# Patient Record
Sex: FEMALE | Race: BLACK OR AFRICAN AMERICAN | NOT HISPANIC OR LATINO | Employment: UNEMPLOYED | URBAN - METROPOLITAN AREA
[De-identification: names, ages, dates, MRNs, and addresses within clinical notes are randomized per-mention and may not be internally consistent; named-entity substitution may affect disease eponyms.]

---

## 2023-08-24 PROBLEM — N39.0 UTI (URINARY TRACT INFECTION): Status: ACTIVE | Noted: 2023-08-24

## 2023-08-24 PROBLEM — E11.9 DMII (DIABETES MELLITUS, TYPE 2) (MULTI): Status: ACTIVE | Noted: 2023-08-24

## 2023-08-24 PROBLEM — E78.5 HLD (HYPERLIPIDEMIA): Status: ACTIVE | Noted: 2023-08-24

## 2023-08-24 PROBLEM — H33.22 LEFT RETINAL DETACHMENT: Status: ACTIVE | Noted: 2023-08-24

## 2023-08-24 PROBLEM — H54.7 POOR VISION: Status: ACTIVE | Noted: 2023-08-24

## 2023-08-24 PROBLEM — D64.9 ANEMIA: Status: ACTIVE | Noted: 2023-08-24

## 2023-08-24 PROBLEM — R29.898 LOWER EXTREMITY WEAKNESS: Status: ACTIVE | Noted: 2023-08-24

## 2023-08-24 PROBLEM — Z86.73 HISTORY OF STROKE: Status: ACTIVE | Noted: 2023-08-24

## 2023-08-24 PROBLEM — E55.9 VITAMIN D DEFICIENCY: Status: ACTIVE | Noted: 2023-08-24

## 2023-08-24 PROBLEM — I25.10 CAD (CORONARY ARTERY DISEASE): Status: ACTIVE | Noted: 2023-08-24

## 2023-08-24 PROBLEM — R53.83 FATIGUE: Status: ACTIVE | Noted: 2023-08-24

## 2023-08-24 PROBLEM — R80.9 PROTEINURIA: Status: ACTIVE | Noted: 2023-08-24

## 2023-08-24 PROBLEM — I10 HYPERTENSION, ESSENTIAL: Status: ACTIVE | Noted: 2023-08-24

## 2023-08-24 PROBLEM — I25.2 H/O NON-ST ELEVATION MYOCARDIAL INFARCTION (NSTEMI): Status: ACTIVE | Noted: 2023-08-24

## 2023-08-24 PROBLEM — N18.32 ANEMIA DUE TO STAGE 3B CHRONIC KIDNEY DISEASE (MULTI): Status: ACTIVE | Noted: 2023-08-24

## 2023-08-24 PROBLEM — D63.1 ANEMIA DUE TO STAGE 3B CHRONIC KIDNEY DISEASE (MULTI): Status: ACTIVE | Noted: 2023-08-24

## 2023-08-24 RX ORDER — FAMOTIDINE 20 MG/1
1 TABLET, FILM COATED ORAL DAILY
COMMUNITY
Start: 2021-12-01 | End: 2023-12-21 | Stop reason: WASHOUT

## 2023-08-24 RX ORDER — DULAGLUTIDE 0.75 MG/.5ML
1 INJECTION, SOLUTION SUBCUTANEOUS
COMMUNITY
Start: 2022-03-14 | End: 2024-04-24 | Stop reason: SDUPTHER

## 2023-08-24 RX ORDER — ASPIRIN 325 MG
1 TABLET, DELAYED RELEASE (ENTERIC COATED) ORAL
COMMUNITY
Start: 2022-03-10 | End: 2024-01-04 | Stop reason: WASHOUT

## 2023-08-24 RX ORDER — ERGOCALCIFEROL 1.25 MG/1
CAPSULE ORAL
COMMUNITY
Start: 2022-03-30 | End: 2024-01-18 | Stop reason: ALTCHOICE

## 2023-08-24 RX ORDER — BUPROPION HYDROCHLORIDE 200 MG/1
1 TABLET, EXTENDED RELEASE ORAL 2 TIMES DAILY
COMMUNITY
Start: 2023-08-16

## 2023-08-24 RX ORDER — FERROUS SULFATE 325(65) MG
1 TABLET ORAL DAILY
COMMUNITY
Start: 2022-03-30 | End: 2024-04-24 | Stop reason: SDUPTHER

## 2023-08-24 RX ORDER — AMLODIPINE BESYLATE 10 MG/1
1 TABLET ORAL DAILY
COMMUNITY
Start: 2021-12-01 | End: 2024-04-24 | Stop reason: SDUPTHER

## 2023-08-24 RX ORDER — NAPROXEN SODIUM 220 MG/1
1 TABLET, FILM COATED ORAL DAILY
COMMUNITY
Start: 2021-12-01

## 2023-08-24 RX ORDER — CHOLECALCIFEROL (VITAMIN D3) 50 MCG
1 TABLET ORAL DAILY
COMMUNITY
Start: 2023-08-16 | End: 2024-01-24 | Stop reason: WASHOUT

## 2023-08-24 RX ORDER — CLOPIDOGREL BISULFATE 75 MG/1
1 TABLET ORAL DAILY
COMMUNITY
Start: 2022-03-30 | End: 2024-04-24 | Stop reason: SDUPTHER

## 2023-08-24 RX ORDER — PANTOPRAZOLE SODIUM 20 MG/1
1 TABLET, DELAYED RELEASE ORAL DAILY
COMMUNITY
Start: 2023-08-16

## 2023-08-24 RX ORDER — ATORVASTATIN CALCIUM 80 MG/1
1 TABLET, FILM COATED ORAL NIGHTLY
COMMUNITY
Start: 2021-12-01 | End: 2024-04-24 | Stop reason: SDUPTHER

## 2023-08-24 RX ORDER — LISINOPRIL 10 MG/1
1 TABLET ORAL DAILY
COMMUNITY
Start: 2022-03-10 | End: 2024-01-04 | Stop reason: DRUGHIGH

## 2023-08-24 RX ORDER — BLOOD-GLUCOSE METER
KIT MISCELLANEOUS
COMMUNITY
Start: 2022-03-02 | End: 2024-02-15 | Stop reason: ALTCHOICE

## 2023-08-24 RX ORDER — FUROSEMIDE 40 MG/1
40 TABLET ORAL 2 TIMES DAILY
COMMUNITY
Start: 2023-04-13 | End: 2024-01-18 | Stop reason: ALTCHOICE

## 2023-08-24 RX ORDER — BISACODYL 5 MG
1 TABLET, DELAYED RELEASE (ENTERIC COATED) ORAL DAILY PRN
COMMUNITY
Start: 2023-08-16 | End: 2024-04-24 | Stop reason: SDUPTHER

## 2023-08-24 RX ORDER — FOLIC ACID 1 MG/1
1 TABLET ORAL DAILY
COMMUNITY
Start: 2023-08-16 | End: 2024-04-24 | Stop reason: SDUPTHER

## 2023-08-24 RX ORDER — DOCUSATE SODIUM 100 MG/1
1 CAPSULE, LIQUID FILLED ORAL 2 TIMES DAILY PRN
COMMUNITY
Start: 2023-08-16

## 2023-08-24 RX ORDER — BLOOD SUGAR DIAGNOSTIC
STRIP MISCELLANEOUS
COMMUNITY
Start: 2022-03-07 | End: 2024-04-24 | Stop reason: SDUPTHER

## 2023-08-24 RX ORDER — CARVEDILOL 12.5 MG/1
1 TABLET ORAL
COMMUNITY
Start: 2021-12-01 | End: 2024-04-24 | Stop reason: SDUPTHER

## 2023-08-24 RX ORDER — ACETAMINOPHEN 500 MG
1000 TABLET ORAL EVERY 8 HOURS
COMMUNITY
Start: 2023-08-16 | End: 2023-12-21 | Stop reason: WASHOUT

## 2023-12-01 ENCOUNTER — LAB (OUTPATIENT)
Dept: LAB | Facility: LAB | Age: 53
End: 2023-12-01
Payer: MEDICARE

## 2023-12-01 ENCOUNTER — OFFICE VISIT (OUTPATIENT)
Dept: NEPHROLOGY | Facility: CLINIC | Age: 53
End: 2023-12-01
Payer: MEDICARE

## 2023-12-01 VITALS
SYSTOLIC BLOOD PRESSURE: 150 MMHG | DIASTOLIC BLOOD PRESSURE: 77 MMHG | HEIGHT: 63 IN | RESPIRATION RATE: 18 BRPM | OXYGEN SATURATION: 99 % | BODY MASS INDEX: 30.55 KG/M2 | WEIGHT: 172.4 LBS | HEART RATE: 97 BPM | TEMPERATURE: 97.7 F

## 2023-12-01 DIAGNOSIS — N18.4 TYPE 2 DIABETES MELLITUS WITH STAGE 4 CHRONIC KIDNEY DISEASE, UNSPECIFIED WHETHER LONG TERM INSULIN USE (MULTI): Primary | ICD-10-CM

## 2023-12-01 DIAGNOSIS — I10 HYPERTENSION, ESSENTIAL: ICD-10-CM

## 2023-12-01 DIAGNOSIS — N18.4 CHRONIC KIDNEY DISEASE, STAGE 4 (SEVERE) (MULTI): ICD-10-CM

## 2023-12-01 DIAGNOSIS — R80.1 PERSISTENT PROTEINURIA: ICD-10-CM

## 2023-12-01 DIAGNOSIS — N39.0 URINARY TRACT INFECTION WITHOUT HEMATURIA, SITE UNSPECIFIED: ICD-10-CM

## 2023-12-01 DIAGNOSIS — E11.22 TYPE 2 DIABETES MELLITUS WITH STAGE 4 CHRONIC KIDNEY DISEASE, UNSPECIFIED WHETHER LONG TERM INSULIN USE (MULTI): Primary | ICD-10-CM

## 2023-12-01 DIAGNOSIS — E11.22 TYPE 2 DIABETES MELLITUS WITH STAGE 4 CHRONIC KIDNEY DISEASE, UNSPECIFIED WHETHER LONG TERM INSULIN USE (MULTI): ICD-10-CM

## 2023-12-01 DIAGNOSIS — N13.5 OBSTRUCTION OF URETER, UNSPECIFIED LATERALITY: ICD-10-CM

## 2023-12-01 DIAGNOSIS — N18.4 TYPE 2 DIABETES MELLITUS WITH STAGE 4 CHRONIC KIDNEY DISEASE, UNSPECIFIED WHETHER LONG TERM INSULIN USE (MULTI): ICD-10-CM

## 2023-12-01 LAB
APPEARANCE UR: ABNORMAL
BILIRUB UR STRIP.AUTO-MCNC: ABNORMAL MG/DL
COLOR UR: YELLOW
CREAT UR-MCNC: 121.5 MG/DL (ref 20–320)
ERYTHROCYTE [DISTWIDTH] IN BLOOD BY AUTOMATED COUNT: 11.2 % (ref 11.5–14.5)
GLUCOSE UR STRIP.AUTO-MCNC: NEGATIVE MG/DL
HCT VFR BLD AUTO: 31.1 % (ref 36–46)
HGB BLD-MCNC: 10.5 G/DL (ref 12–16)
HOLD SPECIMEN: NORMAL
KETONES UR STRIP.AUTO-MCNC: NEGATIVE MG/DL
LEUKOCYTE ESTERASE UR QL STRIP.AUTO: ABNORMAL
MCH RBC QN AUTO: 30 PG (ref 26–34)
MCHC RBC AUTO-ENTMCNC: 33.8 G/DL (ref 32–36)
MCV RBC AUTO: 89 FL (ref 80–100)
MICROALBUMIN UR-MCNC: 1174.2 MG/L
MICROALBUMIN/CREAT UR: 966.4 UG/MG CREAT
NITRITE UR QL STRIP.AUTO: NEGATIVE
NRBC BLD-RTO: 0 /100 WBCS (ref 0–0)
PH UR STRIP.AUTO: 5.5 [PH]
PLATELET # BLD AUTO: 283 X10*3/UL (ref 150–450)
PROT UR STRIP.AUTO-MCNC: ABNORMAL MG/DL
PTH-INTACT SERPL-MCNC: 203 PG/ML (ref 18.5–88)
RBC # BLD AUTO: 3.5 X10*6/UL (ref 4–5.2)
RBC # UR STRIP.AUTO: ABNORMAL /UL
RBC #/AREA URNS AUTO: ABNORMAL /HPF
SP GR UR STRIP.AUTO: 1.02
UROBILINOGEN UR STRIP.AUTO-MCNC: 0.2 MG/DL
WBC # BLD AUTO: 6 X10*3/UL (ref 4.4–11.3)
WBC #/AREA URNS AUTO: ABNORMAL /HPF

## 2023-12-01 PROCEDURE — 80053 COMPREHEN METABOLIC PANEL: CPT

## 2023-12-01 PROCEDURE — 4010F ACE/ARB THERAPY RXD/TAKEN: CPT | Performed by: NURSE PRACTITIONER

## 2023-12-01 PROCEDURE — 82043 UR ALBUMIN QUANTITATIVE: CPT

## 2023-12-01 PROCEDURE — 3062F POS MACROALBUMINURIA REV: CPT | Performed by: NURSE PRACTITIONER

## 2023-12-01 PROCEDURE — 82306 VITAMIN D 25 HYDROXY: CPT

## 2023-12-01 PROCEDURE — 81001 URINALYSIS AUTO W/SCOPE: CPT

## 2023-12-01 PROCEDURE — 1036F TOBACCO NON-USER: CPT | Performed by: NURSE PRACTITIONER

## 2023-12-01 PROCEDURE — 3066F NEPHROPATHY DOC TX: CPT | Performed by: NURSE PRACTITIONER

## 2023-12-01 PROCEDURE — 99214 OFFICE O/P EST MOD 30 MIN: CPT | Performed by: NURSE PRACTITIONER

## 2023-12-01 PROCEDURE — 3078F DIAST BP <80 MM HG: CPT | Performed by: NURSE PRACTITIONER

## 2023-12-01 PROCEDURE — 3077F SYST BP >= 140 MM HG: CPT | Performed by: NURSE PRACTITIONER

## 2023-12-01 PROCEDURE — 85027 COMPLETE CBC AUTOMATED: CPT

## 2023-12-01 PROCEDURE — 83036 HEMOGLOBIN GLYCOSYLATED A1C: CPT

## 2023-12-01 PROCEDURE — 87086 URINE CULTURE/COLONY COUNT: CPT

## 2023-12-01 PROCEDURE — 87186 SC STD MICRODIL/AGAR DIL: CPT

## 2023-12-01 PROCEDURE — 83970 ASSAY OF PARATHORMONE: CPT

## 2023-12-01 PROCEDURE — 82570 ASSAY OF URINE CREATININE: CPT

## 2023-12-01 PROCEDURE — 36415 COLL VENOUS BLD VENIPUNCTURE: CPT

## 2023-12-02 DIAGNOSIS — N39.0 URINARY TRACT INFECTION WITHOUT HEMATURIA, SITE UNSPECIFIED: Primary | ICD-10-CM

## 2023-12-02 LAB
25(OH)D3 SERPL-MCNC: 27 NG/ML (ref 30–100)
ALBUMIN SERPL BCP-MCNC: 3.7 G/DL (ref 3.4–5)
ALP SERPL-CCNC: 78 U/L (ref 33–110)
ALT SERPL W P-5'-P-CCNC: 15 U/L (ref 7–45)
ANION GAP SERPL CALC-SCNC: 18 MMOL/L (ref 10–20)
AST SERPL W P-5'-P-CCNC: 22 U/L (ref 9–39)
BILIRUB SERPL-MCNC: 0.5 MG/DL (ref 0–1.2)
BUN SERPL-MCNC: 46 MG/DL (ref 6–23)
CALCIUM SERPL-MCNC: 9.4 MG/DL (ref 8.6–10.6)
CHLORIDE SERPL-SCNC: 104 MMOL/L (ref 98–107)
CO2 SERPL-SCNC: 24 MMOL/L (ref 21–32)
CREAT SERPL-MCNC: 3.3 MG/DL (ref 0.5–1.05)
GFR SERPL CREATININE-BSD FRML MDRD: 16 ML/MIN/1.73M*2
GLUCOSE SERPL-MCNC: 163 MG/DL (ref 74–99)
POTASSIUM SERPL-SCNC: 4.6 MMOL/L (ref 3.5–5.3)
PROT SERPL-MCNC: 6.9 G/DL (ref 6.4–8.2)
SODIUM SERPL-SCNC: 141 MMOL/L (ref 136–145)

## 2023-12-02 RX ORDER — CIPROFLOXACIN 500 MG/1
500 TABLET ORAL DAILY
Qty: 7 TABLET | Refills: 0 | Status: SHIPPED | OUTPATIENT
Start: 2023-12-02 | End: 2023-12-02 | Stop reason: ENTERED-IN-ERROR

## 2023-12-02 RX ORDER — CIPROFLOXACIN 500 MG/1
500 TABLET ORAL DAILY
Qty: 7 TABLET | Refills: 0 | Status: SHIPPED | OUTPATIENT
Start: 2023-12-02 | End: 2023-12-09

## 2023-12-03 LAB — BACTERIA UR CULT: ABNORMAL

## 2023-12-04 RX ORDER — AMOXICILLIN AND CLAVULANATE POTASSIUM 500; 125 MG/1; MG/1
500 TABLET, FILM COATED ORAL 2 TIMES DAILY
Qty: 14 TABLET | Refills: 0 | Status: SHIPPED | OUTPATIENT
Start: 2023-12-04 | End: 2023-12-11

## 2023-12-06 ASSESSMENT — ENCOUNTER SYMPTOMS
HEMATOLOGIC/LYMPHATIC NEGATIVE: 1
NEUROLOGICAL NEGATIVE: 1
PSYCHIATRIC NEGATIVE: 1
GASTROINTESTINAL NEGATIVE: 1
MUSCULOSKELETAL NEGATIVE: 1
RESPIRATORY NEGATIVE: 1
CONSTITUTIONAL NEGATIVE: 1
CARDIOVASCULAR NEGATIVE: 1
ENDOCRINE NEGATIVE: 1

## 2023-12-07 LAB
EST. AVERAGE GLUCOSE BLD GHB EST-MCNC: 100 MG/DL
HBA1C MFR BLD: 5.1 %

## 2023-12-07 NOTE — PROGRESS NOTES
History Of Present Illness  Yola Barillas is a 53 y.o. female with medical history significant for CKD4, proteinuria, DMII with proliferative retinopathy, HTN, HLD, CAD, NSTEMI, CVA (2019), ureteral obstruction due to extrinsic compression from fibroids s/p bilateral stent placement (9/29/2022) and s/p hysterectomy & removal of left stent (10/19/23), right ureteral stricture s/p right ureteral stent placement (1/10/23) and subsequent retrograde pyelogram (1/24/23), JOYCELYN, E.coli urosepsis needing hospitalization (10/7 - 10/21/22), and long-term NSAIDs use who presents for a 3-month fuv for CKD.    Of note, patient was previously followed by Nephrology at [facility], and recently established care at  with Dr. Carroll in August 2023. She was previously followed by Urology at [facility], but has not been seen since retrograde pyelogram (1/24/23).     Past Medical History  As above.    Surgical History  She has a past surgical history that includes Other surgical history (03/30/2022).     Social History  She reports that she has never smoked. She has never used smokeless tobacco. She reports that she does not drink alcohol and does not use drugs.    Family History  Family History   Problem Relation Name Age of Onset    Heart attack Mother          Allergies  Sulfamethoxazole-trimethoprim    Review of Systems   Constitutional: Negative.    HENT: Negative.     Eyes:  Positive for visual disturbance.   Respiratory: Negative.     Cardiovascular: Negative.    Gastrointestinal: Negative.    Endocrine: Negative.    Genitourinary: Negative.    Musculoskeletal: Negative.    Skin: Negative.    Neurological: Negative.    Hematological: Negative.    Psychiatric/Behavioral: Negative.          Physical Exam  Constitutional:       Appearance: Normal appearance.   HENT:      Head: Normocephalic and atraumatic.      Mouth/Throat:      Mouth: Mucous membranes are moist.   Cardiovascular:      Rate and Rhythm: Normal rate  "and regular rhythm.      Pulses: Normal pulses.      Heart sounds: Normal heart sounds.   Pulmonary:      Effort: Pulmonary effort is normal.      Breath sounds: Normal breath sounds.   Musculoskeletal:         General: Normal range of motion.   Skin:     General: Skin is warm and dry.   Neurological:      General: No focal deficit present.      Mental Status: She is alert and oriented to person, place, and time.   Psychiatric:         Mood and Affect: Mood normal.         Behavior: Behavior normal.         Thought Content: Thought content normal.         Judgment: Judgment normal.          Last Recorded Vitals  Blood pressure 150/77, pulse 97, temperature 36.5 °C (97.7 °F), resp. rate 18, height 1.6 m (5' 3\"), weight 78.2 kg (172 lb 6.4 oz), SpO2 99 %.    Relevant Results  4/11/23: sCr 2.65 mg/dL, eGFR 21 ml/min/1.73m2.  12/15/22: HgbA1c 5.4%.    Assessment/Plan   53 y.o. female with medical history significant for CKD4, proteinuria, DMII with proliferative retinopathy, HTN, HLD, CAD, NSTEMI, CVA (2019), ureteral obstruction due to extrinsic compression from fibroids s/p bilateral stent placement (9/29/2022) and s/p hysterectomy & removal of left stent (10/19/23), right ureteral stricture s/p right ureteral stent placement (1/10/23) and subsequent retrograde pyelogram (1/24/23), JOYCELYN, E.coli urosepsis needing hospitalization (10/7 - 10/21/22), and long-term NSAIDs use who presents for a 3-month fuv for CKD.    # CKD4: baseline sCr 2.2 - 3.4 mg/dL with eGFR 18 - 26 ml/min/1.73m2. Likely due to diabetic nephropathy, hypertensive nephropathy, obstructive nephropathy, analgesic nephropathy given hx of long-term NSAIDs use, and partial recovery from previous JOYCELYN. Most recent sCr 2.65 mg/dL (4/11/23) - at baseline. Currently stable.    # Proteinuria: random urine spot tests showed urine albumin level > 2250 mg/L since at least March 2022. Likely due to diabetic nephropathy and hypertensive nephropathy. On ACEi.    # DMII: " > 20 years. Most recent HgbA1c 5.4% (12/15/22). Followed by PCP.    # HTN: > 20 years. Slightly elevated in office today. No regular home BP checks.    # Ureteral obstruction: with severe hydroureteronephrosis due to extrinsic compression from fibroids s/p bilateral stent placement (2022) and s/p hysterectomy & removal of left stent (10/19/23); right ureteral stricture s/p right ureteral stent placement (1/10/23) and subsequent retrograde pyelogram (23). Previously followed at [facility], but has not been seen since retrograde pyelogram (23). No further follow-up renal imaging.    Plan:  - Labs: CBC, CMP, PTH, Vit D, HgbA1c; UA, urine spot.  - Kidney US.  - Refer to Kidney Care Navigator program.  - Advised regular home BP checks and keep a log to bring it to next visit. Target < 130/80 mmHg. To call providers if BPs are persistently > 130/80 mmHg.  - Continue to follow up with Urology.  - Continue to follow up with PCP for optimal DMII and HTN management.  - No changes with current medications.  - Reviewed strategies for preserving remaining kidney function includin) Avoidance of NSAIDs including Aleve (Naprosyn), Motrin (Ibuprofen), Mobic (Meloxicam), Celebrex (Celecoxib) and aspirin as well as PPI acid blocking medications such as Prilosec (omeprazole), Protonix (pantoprazole), and Nexium (esomeprazole), as well as other nephrotoxic agents.   2) Avoidance of tobacco or alcohol use.   3) Adequate hydration daily.   4) Blood pressure target 130/80 mmHg.   5) Daily dietary sodium intake less than 2 grams per day.    6) Maintain healthy lifestyle. Healthy diet and regular exercises.   7) Maintain ideal weight.  - FUV: in 3 months or sooner if concerns arise.     Patient verbalized understanding of above. She will not hesitate to contact Division of Nephrology at [phone] with concerns/questions.    I spent 35 minutes in the professional and overall care of this patient.      Katerine  Kristopher, APRN-CNP

## 2023-12-21 ENCOUNTER — APPOINTMENT (OUTPATIENT)
Dept: NEPHROLOGY | Facility: CLINIC | Age: 53
End: 2023-12-21
Payer: MEDICARE

## 2023-12-21 ENCOUNTER — OFFICE VISIT (OUTPATIENT)
Dept: NEPHROLOGY | Facility: CLINIC | Age: 53
End: 2023-12-21
Payer: MEDICARE

## 2023-12-21 VITALS
HEIGHT: 63 IN | DIASTOLIC BLOOD PRESSURE: 92 MMHG | HEART RATE: 90 BPM | WEIGHT: 176 LBS | BODY MASS INDEX: 31.18 KG/M2 | SYSTOLIC BLOOD PRESSURE: 165 MMHG

## 2023-12-21 DIAGNOSIS — N18.4 HYPERTENSION ASSOCIATED WITH STAGE 4 CHRONIC KIDNEY DISEASE DUE TO TYPE 2 DIABETES MELLITUS (MULTI): ICD-10-CM

## 2023-12-21 DIAGNOSIS — E11.22 HYPERTENSION ASSOCIATED WITH STAGE 4 CHRONIC KIDNEY DISEASE DUE TO TYPE 2 DIABETES MELLITUS (MULTI): ICD-10-CM

## 2023-12-21 DIAGNOSIS — I10 HYPERTENSION, ESSENTIAL: Primary | ICD-10-CM

## 2023-12-21 DIAGNOSIS — N18.4 CHRONIC KIDNEY DISEASE, STAGE 4 (SEVERE) (MULTI): ICD-10-CM

## 2023-12-21 DIAGNOSIS — I12.9 HYPERTENSION ASSOCIATED WITH STAGE 4 CHRONIC KIDNEY DISEASE DUE TO TYPE 2 DIABETES MELLITUS (MULTI): ICD-10-CM

## 2023-12-21 PROCEDURE — 3044F HG A1C LEVEL LT 7.0%: CPT | Performed by: NURSE PRACTITIONER

## 2023-12-21 PROCEDURE — 1036F TOBACCO NON-USER: CPT | Performed by: NURSE PRACTITIONER

## 2023-12-21 PROCEDURE — 4010F ACE/ARB THERAPY RXD/TAKEN: CPT | Performed by: NURSE PRACTITIONER

## 2023-12-21 PROCEDURE — 99215 OFFICE O/P EST HI 40 MIN: CPT | Performed by: NURSE PRACTITIONER

## 2023-12-21 PROCEDURE — 3066F NEPHROPATHY DOC TX: CPT | Performed by: NURSE PRACTITIONER

## 2023-12-21 PROCEDURE — 3062F POS MACROALBUMINURIA REV: CPT | Performed by: NURSE PRACTITIONER

## 2023-12-21 ASSESSMENT — ENCOUNTER SYMPTOMS
CHEST TIGHTNESS: 0
HEMATURIA: 0
PALPITATIONS: 1
SPEECH DIFFICULTY: 1
ACTIVITY CHANGE: 1
DIZZINESS: 0
VOMITING: 0
LIGHT-HEADEDNESS: 0
APPETITE CHANGE: 0
DYSURIA: 0
NAUSEA: 0
SHORTNESS OF BREATH: 0

## 2023-12-21 ASSESSMENT — PAIN SCALES - GENERAL: PAINLEVEL: 0-NO PAIN

## 2023-12-21 NOTE — PATIENT INSTRUCTIONS
It was nice to meet you Yola!  Your blood pressure is above goal but you have not taken your meds yet  Please make sure you are taking carvedilol twice daily, not just once.  Please return in 2 weeks for BP and lab check.  Please make sure to take your pills in the morning before coming.   I will refer you to dietician for assistance.  Avoid foods high in sodium.  Avoid red meat.  Incorporate plant based protein in diet if you want to try it even one day per week  Avoid processed foods and carry out.   At this point you are leaning towards PD but will research and think about options

## 2023-12-21 NOTE — PROGRESS NOTES
"Subjective   Patient ID: Yola Barillas is a 53 y.o. female who presents for Follow-up.  HPI  Pt with CKD stage 4 with +albuminuria last seen by JERRELL Summers in early Dec and by Carly in August. Was previously followed by nephrologist at [facility]. Had JOYCELYN on CKD in 10/2022 while hospitalized with sepsis. Some improvement following that episode but recent labs 12/1 reveal progression.  Last Cr 3.30 with eGFR 16ml/min. PMH includes remote CVA (2019).   Has noticed over past months that she is more tired, taking longer in the morning to \"get going\"      Review of Systems   Constitutional:  Positive for activity change. Negative for appetite change.        Energy level worsening over past year.  Takes naps during day   Respiratory:  Negative for chest tightness and shortness of breath.    Cardiovascular:  Positive for palpitations. Negative for chest pain and leg swelling.        Denies MCKINLEY   Has occasionally felt some palpitations at night    Gastrointestinal:  Negative for nausea and vomiting.   Genitourinary:  Negative for dysuria and hematuria.   Skin:  Negative for rash.        +itching all over for ~1 month    Neurological:  Positive for speech difficulty. Negative for dizziness and light-headedness.        Some word finding since stroke.   Halting speech        Objective   Physical Exam  Constitutional:       General: She is not in acute distress.     Appearance: Normal appearance. She is not ill-appearing.   HENT:      Mouth/Throat:      Mouth: Mucous membranes are moist.   Cardiovascular:      Rate and Rhythm: Normal rate and regular rhythm.      Heart sounds: Normal heart sounds.   Pulmonary:      Effort: Pulmonary effort is normal.      Breath sounds: Normal breath sounds.   Abdominal:      Palpations: Abdomen is soft.   Musculoskeletal:         General: No swelling.      Cervical back: Normal range of motion.      Right lower leg: No edema.      Left lower leg: No edema.   Skin:     General: Skin is warm and " "dry.   Neurological:      Mental Status: She is alert.      Comments: Halting speech at times    Psychiatric:         Mood and Affect: Mood normal.         Behavior: Behavior normal.     BP (!) 165/92   Pulse 90   Ht 1.6 m (5' 3\")   Wt 79.8 kg (176 lb)   BMI 31.18 kg/m²      Met with pt to discuss RRT modalities: PD, iHD, Home HD, transplant and conservative medical management.  Extensive discussion of the pros and cons of each modality.  Discussed next steps involved in planning for HD: vein sparing, vein mapping, vascular consult, surgery for access placement and CDC decision.  Discussed next steps of PD: PD nephrologist consult, PD consult with nurses, dietician and SW, surgery consult for PD catheter placement, home visit.  Discussed indications for initiating RRT.    Discussed transplant process: types of donors, waiting list, surgery involved and initial steps: class, meetings with nephrologist, surgeon, SW etc. Reinforced attending all scheduled appts with all providers and adherence to medications.  Reinforced blood pressure control and/or blood sugar control (if applies) to extend life of kidney.  Dietary recs: low sodium, avoidance of red meat, modified protein, introducing plant based proteins, components of renal diet: offered referral to dietician.  Pt interactive with questions, satisfied with answers.  Given internet sites to review: [email or web address] and [email or web address]  Shared contact info for this provider.  Next appt with nephrologist:    At the end of the session pt is leaning towards PD but will like to think about it and read more about it.        Assessment/Plan   Diagnoses and all orders for this visit:  Hypertension, essential  BP elevated today, has not taken meds yet today   Was not taking carvedilol twice daily  To return in 2 weeks for BP recheck and labs  Hypertension associated with stage 4 chronic kidney disease due to type 2 diabetes mellitus (CMS/[facility])  No obvious uremic symptoms, " overall more fatigue over past year   ESRD Options education. May be leading toward PD but will look further into it   Follow up in 2 weeks for repeat BP and lab check   Dietician referral          EDILBERTO Stout-CNP 12/21/23 11:25 AM

## 2024-01-04 ENCOUNTER — LAB (OUTPATIENT)
Dept: LAB | Facility: LAB | Age: 54
End: 2024-01-04
Payer: MEDICARE

## 2024-01-04 ENCOUNTER — OFFICE VISIT (OUTPATIENT)
Dept: NEPHROLOGY | Facility: CLINIC | Age: 54
End: 2024-01-04
Payer: MEDICARE

## 2024-01-04 VITALS
WEIGHT: 185 LBS | HEART RATE: 94 BPM | BODY MASS INDEX: 31.58 KG/M2 | SYSTOLIC BLOOD PRESSURE: 159 MMHG | HEIGHT: 64 IN | DIASTOLIC BLOOD PRESSURE: 81 MMHG

## 2024-01-04 DIAGNOSIS — N39.0 URINARY TRACT INFECTION WITHOUT HEMATURIA, SITE UNSPECIFIED: ICD-10-CM

## 2024-01-04 DIAGNOSIS — D63.1 ANEMIA DUE TO STAGE 3B CHRONIC KIDNEY DISEASE (MULTI): ICD-10-CM

## 2024-01-04 DIAGNOSIS — N18.32 ANEMIA DUE TO STAGE 3B CHRONIC KIDNEY DISEASE (MULTI): ICD-10-CM

## 2024-01-04 DIAGNOSIS — E11.22 HYPERTENSION ASSOCIATED WITH STAGE 4 CHRONIC KIDNEY DISEASE DUE TO TYPE 2 DIABETES MELLITUS (MULTI): ICD-10-CM

## 2024-01-04 DIAGNOSIS — I12.9 HYPERTENSION ASSOCIATED WITH STAGE 4 CHRONIC KIDNEY DISEASE DUE TO TYPE 2 DIABETES MELLITUS (MULTI): ICD-10-CM

## 2024-01-04 DIAGNOSIS — I12.9 HYPERTENSION ASSOCIATED WITH STAGE 4 CHRONIC KIDNEY DISEASE DUE TO TYPE 2 DIABETES MELLITUS (MULTI): Primary | ICD-10-CM

## 2024-01-04 DIAGNOSIS — N18.4 HYPERTENSION ASSOCIATED WITH STAGE 4 CHRONIC KIDNEY DISEASE DUE TO TYPE 2 DIABETES MELLITUS (MULTI): ICD-10-CM

## 2024-01-04 DIAGNOSIS — E11.22 HYPERTENSION ASSOCIATED WITH STAGE 4 CHRONIC KIDNEY DISEASE DUE TO TYPE 2 DIABETES MELLITUS (MULTI): Primary | ICD-10-CM

## 2024-01-04 DIAGNOSIS — I10 HYPERTENSION, ESSENTIAL: ICD-10-CM

## 2024-01-04 DIAGNOSIS — N18.4 HYPERTENSION ASSOCIATED WITH STAGE 4 CHRONIC KIDNEY DISEASE DUE TO TYPE 2 DIABETES MELLITUS (MULTI): Primary | ICD-10-CM

## 2024-01-04 LAB
ALBUMIN SERPL BCP-MCNC: 3.7 G/DL (ref 3.4–5)
ANION GAP SERPL CALC-SCNC: 16 MMOL/L (ref 10–20)
APPEARANCE UR: ABNORMAL
BACTERIA #/AREA URNS AUTO: ABNORMAL /HPF
BASOPHILS # BLD AUTO: 0.02 X10*3/UL (ref 0–0.1)
BASOPHILS NFR BLD AUTO: 0.3 %
BILIRUB UR STRIP.AUTO-MCNC: NEGATIVE MG/DL
BUN SERPL-MCNC: 43 MG/DL (ref 6–23)
CALCIUM SERPL-MCNC: 8.9 MG/DL (ref 8.6–10.6)
CHLORIDE SERPL-SCNC: 107 MMOL/L (ref 98–107)
CO2 SERPL-SCNC: 23 MMOL/L (ref 21–32)
COLOR UR: YELLOW
CREAT SERPL-MCNC: 3.18 MG/DL (ref 0.5–1.05)
EOSINOPHIL # BLD AUTO: 0.07 X10*3/UL (ref 0–0.7)
EOSINOPHIL NFR BLD AUTO: 1 %
ERYTHROCYTE [DISTWIDTH] IN BLOOD BY AUTOMATED COUNT: 11 % (ref 11.5–14.5)
FERRITIN SERPL-MCNC: 317 NG/ML (ref 8–150)
GFR SERPL CREATININE-BSD FRML MDRD: 17 ML/MIN/1.73M*2
GLUCOSE SERPL-MCNC: 123 MG/DL (ref 74–99)
GLUCOSE UR STRIP.AUTO-MCNC: NEGATIVE MG/DL
HCT VFR BLD AUTO: 31.6 % (ref 36–46)
HGB BLD-MCNC: 10.1 G/DL (ref 12–16)
IMM GRANULOCYTES # BLD AUTO: 0.02 X10*3/UL (ref 0–0.7)
IMM GRANULOCYTES NFR BLD AUTO: 0.3 % (ref 0–0.9)
IRON SATN MFR SERPL: 21 % (ref 25–45)
IRON SERPL-MCNC: 65 UG/DL (ref 35–150)
KETONES UR STRIP.AUTO-MCNC: NEGATIVE MG/DL
LEUKOCYTE ESTERASE UR QL STRIP.AUTO: ABNORMAL
LYMPHOCYTES # BLD AUTO: 0.9 X10*3/UL (ref 1.2–4.8)
LYMPHOCYTES NFR BLD AUTO: 12.5 %
MCH RBC QN AUTO: 28.9 PG (ref 26–34)
MCHC RBC AUTO-ENTMCNC: 32 G/DL (ref 32–36)
MCV RBC AUTO: 91 FL (ref 80–100)
MONOCYTES # BLD AUTO: 0.58 X10*3/UL (ref 0.1–1)
MONOCYTES NFR BLD AUTO: 8.1 %
NEUTROPHILS # BLD AUTO: 5.61 X10*3/UL (ref 1.2–7.7)
NEUTROPHILS NFR BLD AUTO: 77.8 %
NITRITE UR QL STRIP.AUTO: NEGATIVE
NRBC BLD-RTO: 0 /100 WBCS (ref 0–0)
PH UR STRIP.AUTO: 6 [PH]
PHOSPHATE SERPL-MCNC: 5.3 MG/DL (ref 2.5–4.9)
PLATELET # BLD AUTO: 232 X10*3/UL (ref 150–450)
POTASSIUM SERPL-SCNC: 5.8 MMOL/L (ref 3.5–5.3)
PROT UR STRIP.AUTO-MCNC: ABNORMAL MG/DL
RBC # BLD AUTO: 3.49 X10*6/UL (ref 4–5.2)
RBC # UR STRIP.AUTO: ABNORMAL /UL
RBC #/AREA URNS AUTO: ABNORMAL /HPF
SODIUM SERPL-SCNC: 140 MMOL/L (ref 136–145)
SP GR UR STRIP.AUTO: 1.01
TIBC SERPL-MCNC: 314 UG/DL (ref 240–445)
UIBC SERPL-MCNC: 249 UG/DL (ref 110–370)
UROBILINOGEN UR STRIP.AUTO-MCNC: 2 MG/DL
WBC # BLD AUTO: 7.2 X10*3/UL (ref 4.4–11.3)
WBC #/AREA URNS AUTO: >50 /HPF
WBC CLUMPS #/AREA URNS AUTO: ABNORMAL /HPF

## 2024-01-04 PROCEDURE — 82728 ASSAY OF FERRITIN: CPT

## 2024-01-04 PROCEDURE — 3079F DIAST BP 80-89 MM HG: CPT | Performed by: NURSE PRACTITIONER

## 2024-01-04 PROCEDURE — 3077F SYST BP >= 140 MM HG: CPT | Performed by: NURSE PRACTITIONER

## 2024-01-04 PROCEDURE — 36415 COLL VENOUS BLD VENIPUNCTURE: CPT

## 2024-01-04 PROCEDURE — 3066F NEPHROPATHY DOC TX: CPT | Performed by: NURSE PRACTITIONER

## 2024-01-04 PROCEDURE — 83540 ASSAY OF IRON: CPT

## 2024-01-04 PROCEDURE — 85025 COMPLETE CBC W/AUTO DIFF WBC: CPT

## 2024-01-04 PROCEDURE — 80069 RENAL FUNCTION PANEL: CPT

## 2024-01-04 PROCEDURE — 99214 OFFICE O/P EST MOD 30 MIN: CPT | Performed by: NURSE PRACTITIONER

## 2024-01-04 PROCEDURE — 4010F ACE/ARB THERAPY RXD/TAKEN: CPT | Performed by: NURSE PRACTITIONER

## 2024-01-04 PROCEDURE — 83550 IRON BINDING TEST: CPT

## 2024-01-04 PROCEDURE — 81001 URINALYSIS AUTO W/SCOPE: CPT

## 2024-01-04 PROCEDURE — 1036F TOBACCO NON-USER: CPT | Performed by: NURSE PRACTITIONER

## 2024-01-04 RX ORDER — LISINOPRIL 20 MG/1
20 TABLET ORAL DAILY
Qty: 30 TABLET | Refills: 11 | Status: SHIPPED | OUTPATIENT
Start: 2024-01-04 | End: 2024-01-24 | Stop reason: WASHOUT

## 2024-01-04 ASSESSMENT — ENCOUNTER SYMPTOMS
HEMATURIA: 0
VOMITING: 0
LIGHT-HEADEDNESS: 0
DIZZINESS: 0
ACTIVITY CHANGE: 0
PALPITATIONS: 0
CHEST TIGHTNESS: 0
SHORTNESS OF BREATH: 0
NAUSEA: 0
APPETITE CHANGE: 0

## 2024-01-04 ASSESSMENT — PAIN SCALES - GENERAL: PAINLEVEL: 0-NO PAIN

## 2024-01-04 NOTE — PROGRESS NOTES
"Subjective   Patient ID: Yola Barillas is a 53 y.o. female who presents for Follow-up.  HPI  Pt last seen few weeks ago.   CKD stage 4 d/t poorly controlled DM and HTN, heavy protienuria. Presents today for BP check and repeat labs. Had not taken BP meds before visit. Has not taken them this morning either. Denies any new symptoms or c/o since last visit. No ED visits or hospital admissions  Last labs done on 12/1 with Cr 3.3 and eGFR 16ml/min.     Review of Systems   Constitutional:  Negative for activity change and appetite change.   Respiratory:  Negative for chest tightness and shortness of breath.    Cardiovascular:  Negative for chest pain, palpitations and leg swelling.   Gastrointestinal:  Negative for nausea and vomiting.   Genitourinary:  Negative for dyspareunia and hematuria.   Neurological:  Negative for dizziness and light-headedness.       Objective   Physical Exam  Constitutional:       General: She is not in acute distress.     Appearance: Normal appearance. She is not ill-appearing.   HENT:      Mouth/Throat:      Mouth: Mucous membranes are moist.   Cardiovascular:      Rate and Rhythm: Normal rate and regular rhythm.      Heart sounds: Normal heart sounds.   Pulmonary:      Effort: Pulmonary effort is normal.      Breath sounds: Normal breath sounds.   Abdominal:      Palpations: Abdomen is soft.   Musculoskeletal:      Cervical back: Normal range of motion.      Right lower leg: No edema.      Left lower leg: No edema.   Skin:     General: Skin is warm and dry.   Neurological:      Mental Status: She is alert and oriented to person, place, and time. Mental status is at baseline.      Comments: Speech halting      /81   Pulse 94   Ht 1.626 m (5' 4\")   Wt 83.9 kg (185 lb)   BMI 31.76 kg/m²    Assessment/Plan   Diagnoses and all orders for this visit:  Hypertension associated with stage 4 chronic kidney disease due to type 2 diabetes mellitus (CMS/HCC)  No obvious uremic " symptoms  Increase lisinopril to 20mg   Repeat labs  Return in 2 weeks   Hypertension, essential        Remains above goal but did not take meds this am        Increase lisinopril        Reinforced low sodium diet  Anemia due to stage 3b chronic kidney disease (CMS/HCC)  Check iron studies and CBC  Refer to anemia clinic if indicated  Urinary tract infection without hematuria, site unspecified  Pt request for repeat urine d/t recent UTI with EDILBERTO Mclaughlin-CNP 01/04/24 12:07 PM

## 2024-01-04 NOTE — PATIENT INSTRUCTIONS
You have chronic kidney disease stage 4.  Avoid over the counter pain medications like Ibuprofen, naproxen, meloxicam, diclofenac, sulindac etc (NSAIDs). Tylenol is OK to use. Avoid IV contrast dye if possible. Low sodium diet. BP goal less than 130/80 mm Hg.Tell all your health care providers you have chronic kidney disease stage 4.  Labs today  Increase lisinopril to 20mg daily  Please take carvedilol (coreg) twice daily  Take amlodipine at night along with atorvastatin and 2nd dose of carvedilol  Be sure to take lisinopril and first dose of carvedilol first thing in the morning.   Rest of pills whatever works for you.  Please return in 2 weeks for BP check and be sure to make meds in morning few hours before appt.

## 2024-01-05 ENCOUNTER — TELEPHONE (OUTPATIENT)
Dept: NEPHROLOGY | Facility: HOSPITAL | Age: 54
End: 2024-01-05
Payer: MEDICARE

## 2024-01-05 DIAGNOSIS — E87.5 HYPERKALEMIA: Primary | ICD-10-CM

## 2024-01-10 ENCOUNTER — LAB (OUTPATIENT)
Dept: LAB | Facility: LAB | Age: 54
End: 2024-01-10
Payer: MEDICARE

## 2024-01-10 DIAGNOSIS — I12.9 HYPERTENSIVE CHRONIC KIDNEY DISEASE WITH STAGE 1 THROUGH STAGE 4 CHRONIC KIDNEY DISEASE, OR UNSPECIFIED CHRONIC KIDNEY DISEASE: ICD-10-CM

## 2024-01-10 DIAGNOSIS — E87.5 HYPERKALEMIA: ICD-10-CM

## 2024-01-10 DIAGNOSIS — N18.4 HYPERTENSION ASSOCIATED WITH STAGE 4 CHRONIC KIDNEY DISEASE DUE TO TYPE 2 DIABETES MELLITUS (MULTI): Primary | ICD-10-CM

## 2024-01-10 DIAGNOSIS — I12.9 HYPERTENSION ASSOCIATED WITH STAGE 4 CHRONIC KIDNEY DISEASE DUE TO TYPE 2 DIABETES MELLITUS (MULTI): Primary | ICD-10-CM

## 2024-01-10 DIAGNOSIS — E11.22 HYPERTENSION ASSOCIATED WITH STAGE 4 CHRONIC KIDNEY DISEASE DUE TO TYPE 2 DIABETES MELLITUS (MULTI): Primary | ICD-10-CM

## 2024-01-10 LAB
ALBUMIN SERPL BCP-MCNC: 3.7 G/DL (ref 3.4–5)
ANION GAP SERPL CALC-SCNC: 16 MMOL/L (ref 10–20)
BUN SERPL-MCNC: 53 MG/DL (ref 6–23)
CALCIUM SERPL-MCNC: 9.2 MG/DL (ref 8.6–10.6)
CHLORIDE SERPL-SCNC: 105 MMOL/L (ref 98–107)
CO2 SERPL-SCNC: 23 MMOL/L (ref 21–32)
CREAT SERPL-MCNC: 3.74 MG/DL (ref 0.5–1.05)
EGFRCR SERPLBLD CKD-EPI 2021: 14 ML/MIN/1.73M*2
GLUCOSE SERPL-MCNC: 142 MG/DL (ref 74–99)
PHOSPHATE SERPL-MCNC: 4.3 MG/DL (ref 2.5–4.9)
POTASSIUM SERPL-SCNC: 5.3 MMOL/L (ref 3.5–5.3)
SODIUM SERPL-SCNC: 139 MMOL/L (ref 136–145)

## 2024-01-10 PROCEDURE — 36415 COLL VENOUS BLD VENIPUNCTURE: CPT

## 2024-01-10 PROCEDURE — 80069 RENAL FUNCTION PANEL: CPT

## 2024-01-18 ENCOUNTER — TELEMEDICINE (OUTPATIENT)
Dept: PHARMACY | Facility: HOSPITAL | Age: 54
End: 2024-01-18
Payer: MEDICARE

## 2024-01-18 ENCOUNTER — OFFICE VISIT (OUTPATIENT)
Dept: NEPHROLOGY | Facility: CLINIC | Age: 54
End: 2024-01-18
Payer: MEDICARE

## 2024-01-18 VITALS
DIASTOLIC BLOOD PRESSURE: 61 MMHG | SYSTOLIC BLOOD PRESSURE: 117 MMHG | HEIGHT: 64 IN | BODY MASS INDEX: 31.07 KG/M2 | HEART RATE: 65 BPM | WEIGHT: 182 LBS

## 2024-01-18 DIAGNOSIS — N18.4 HYPERTENSION ASSOCIATED WITH STAGE 4 CHRONIC KIDNEY DISEASE DUE TO TYPE 2 DIABETES MELLITUS (MULTI): Primary | ICD-10-CM

## 2024-01-18 DIAGNOSIS — I12.9 HYPERTENSION ASSOCIATED WITH STAGE 4 CHRONIC KIDNEY DISEASE DUE TO TYPE 2 DIABETES MELLITUS (MULTI): Primary | ICD-10-CM

## 2024-01-18 DIAGNOSIS — Z01.818 PREOPERATIVE TESTING: ICD-10-CM

## 2024-01-18 DIAGNOSIS — I12.9 HYPERTENSION ASSOCIATED WITH STAGE 4 CHRONIC KIDNEY DISEASE DUE TO TYPE 2 DIABETES MELLITUS (MULTI): ICD-10-CM

## 2024-01-18 DIAGNOSIS — N18.5 CHRONIC KIDNEY DISEASE, STAGE 5 (MULTI): ICD-10-CM

## 2024-01-18 DIAGNOSIS — I10 HYPERTENSION, ESSENTIAL: ICD-10-CM

## 2024-01-18 DIAGNOSIS — E11.22 HYPERTENSION ASSOCIATED WITH STAGE 4 CHRONIC KIDNEY DISEASE DUE TO TYPE 2 DIABETES MELLITUS (MULTI): Primary | ICD-10-CM

## 2024-01-18 DIAGNOSIS — N18.4 HYPERTENSION ASSOCIATED WITH STAGE 4 CHRONIC KIDNEY DISEASE DUE TO TYPE 2 DIABETES MELLITUS (MULTI): ICD-10-CM

## 2024-01-18 DIAGNOSIS — I12.9 HYPERTENSIVE CHRONIC KIDNEY DISEASE WITH STAGE 1 THROUGH STAGE 4 CHRONIC KIDNEY DISEASE, OR UNSPECIFIED CHRONIC KIDNEY DISEASE: ICD-10-CM

## 2024-01-18 DIAGNOSIS — E11.22 HYPERTENSION ASSOCIATED WITH STAGE 4 CHRONIC KIDNEY DISEASE DUE TO TYPE 2 DIABETES MELLITUS (MULTI): ICD-10-CM

## 2024-01-18 PROCEDURE — 3066F NEPHROPATHY DOC TX: CPT | Performed by: NURSE PRACTITIONER

## 2024-01-18 PROCEDURE — 3074F SYST BP LT 130 MM HG: CPT | Performed by: NURSE PRACTITIONER

## 2024-01-18 PROCEDURE — 4010F ACE/ARB THERAPY RXD/TAKEN: CPT | Performed by: NURSE PRACTITIONER

## 2024-01-18 PROCEDURE — 99214 OFFICE O/P EST MOD 30 MIN: CPT | Performed by: NURSE PRACTITIONER

## 2024-01-18 PROCEDURE — 1036F TOBACCO NON-USER: CPT | Performed by: NURSE PRACTITIONER

## 2024-01-18 PROCEDURE — 3078F DIAST BP <80 MM HG: CPT | Performed by: NURSE PRACTITIONER

## 2024-01-18 ASSESSMENT — ENCOUNTER SYMPTOMS
ACTIVITY CHANGE: 0
LIGHT-HEADEDNESS: 0
NAUSEA: 0
HEMATURIA: 0
DIZZINESS: 0
APPETITE CHANGE: 0
VOMITING: 0
ROS SKIN COMMENTS: NO ITCHING
FLANK PAIN: 0
DYSURIA: 0
SHORTNESS OF BREATH: 0

## 2024-01-18 NOTE — PROGRESS NOTES
Subjective   Patient ID: Yola Barillas is a 53 y.o. female who presents for Follow-up and Chronic Kidney Disease.  HPI  Pt presents for follow up for CKD stage 4/5  Last seen on 1/4  Denies any new symptoms or c/o  Has started  taking carvedilol twice daily.   Had planned increase in lisinopril last visit though called pt after labs done;  held when K+ was elevated   Had episode of dizziness 2 weeks ago. But did not take BP at that time.   Has [facility] pharm appt this afternoon for med rec and discussion of potassium binders   Last labs 1/10 with Cr increased to 3.74 with eGFR 14ml/min  (From 3.18 and 17ml/min)     Lab Results   Component Value Date    CREATININE 3.74 (H) 01/10/2024    BUN 53 (H) 01/10/2024     01/10/2024    K 5.3 01/10/2024     01/10/2024    CO2 23 01/10/2024    GFR 14ml/min (1/10)  Review of Systems   Constitutional:  Negative for activity change and appetite change.   Respiratory:  Negative for shortness of breath.    Cardiovascular:  Negative for chest pain and leg swelling.   Gastrointestinal:  Negative for nausea and vomiting.   Genitourinary:  Negative for dysuria, flank pain and hematuria.   Skin:  Negative for rash.        No itching   Neurological:  Negative for dizziness and light-headedness.        Had episode of dizziness 2 weeks ago. Resolved with lying down. No further episodes       Objective   Physical Exam  Constitutional:       General: She is not in acute distress.     Appearance: Normal appearance. She is not ill-appearing.   HENT:      Mouth/Throat:      Mouth: Mucous membranes are moist.   Cardiovascular:      Rate and Rhythm: Normal rate and regular rhythm.      Heart sounds: Normal heart sounds.   Pulmonary:      Effort: Pulmonary effort is normal.      Breath sounds: Normal breath sounds.   Abdominal:      Palpations: Abdomen is soft.   Musculoskeletal:      Right lower leg: No edema.      Left lower leg: No edema.   Skin:     General: Skin is warm and dry.  "  Neurological:      Mental Status: She is alert and oriented to person, place, and time. Mental status is at baseline.   Psychiatric:         Mood and Affect: Mood normal.         Behavior: Behavior normal.   /61 (BP Location: Right arm, Patient Position: Sitting)   Pulse 65   Ht 1.626 m (5' 4\")   Wt 82.6 kg (182 lb)   BMI 31.24 kg/m²      Assessment/Plan   Diagnoses and all orders for this visit:  Hypertension associated with stage 4 chronic kidney disease due to type 2 diabetes mellitus (CMS/[facility])  CKD with rapid progression over past year with subnephrotic range proteinuria  No obvious uremic symptoms  Hyperkalemia on recent labs, repeated after dietary discussion; K+ 5.3  [facility] today to assess for K+ binder and med rec  Referral to vascular surgery and for testing; orders placed.  Return to clinic in one month     Essential Hypertension  BP significantly lower today; despite dizziness last week reports asymptomatic today and declines adjusting meds at this time. Encouraged to monitor home Bps  Appears euvolemic on exam     Anemia in CKD  Hgb just above 10; ESAs not indicated but will monitor   Tsat 21%;     EDILBERTO Stout-CNP 01/18/24 11:58 AM   "

## 2024-01-18 NOTE — PATIENT INSTRUCTIONS
Your repeat labs showed normal potassium but at high end of normal  Continue dietary recs to avoid foods high in potassium   Appt with [facility] pharmacy today for assistance with potassium binders to allow increase in lisinopril   Record home pressures daily and write down.  Please call me later with readings that you have.   If you feel dizzy please check your blood pressure at that time.   Please return in one month but I will call you with plans

## 2024-01-18 NOTE — PROGRESS NOTES
Subjective   Patient ID: Yola Barillas is a 53 y.o. female who presents for No chief complaint on file..    Referring Provider: Melissa Luz MD     HPI    Review of Systems    Medication System Management:  Affordability/Accessibility: Patient sometimes has hard time locating trulicity when local pharmacy does not have the medication in stock   Adherence/Organization: None  Adverse Effects: None     Objective     There were no vitals taken for this visit.     Labs  Lab Results   Component Value Date    BILITOT 0.5 12/01/2023    CALCIUM 9.2 01/10/2024    CO2 23 01/10/2024     01/10/2024    CREATININE 3.74 (H) 01/10/2024    GLUCOSE 142 (H) 01/10/2024    ALKPHOS 78 12/01/2023    K 5.3 01/10/2024    PROT 6.9 12/01/2023     01/10/2024    AST 22 12/01/2023    ALT 15 12/01/2023    BUN 53 (H) 01/10/2024    ANIONGAP 16 01/10/2024    PHOS 4.3 01/10/2024    ALBUMIN 3.7 01/10/2024    GFRF 30 (A) 03/30/2022     Lab Results   Component Value Date    TRIG 141 03/09/2022    CHOL 221 (H) 03/09/2022    HDL 66.1 03/09/2022     Lab Results   Component Value Date    HGBA1C 5.1 12/01/2023       Current Outpatient Medications on File Prior to Visit   Medication Sig Dispense Refill    amLODIPine (Norvasc) 10 mg tablet Take 1 tablet (10 mg) by mouth once daily.      aspirin 81 mg chewable tablet Chew 1 tablet (81 mg) once daily.      atorvastatin (Lipitor) 80 mg tablet Take 1 tablet (80 mg) by mouth once daily at bedtime.      bisacodyl (Dulcolax, bisacodyl,) 5 mg EC tablet Take 1 tablet (5 mg) by mouth once daily as needed.      buPROPion SR (Wellbutrin SR) 200 mg 12 hr tablet Take 1 tablet (200 mg) by mouth 2 times a day.      carvedilol (Coreg) 12.5 mg tablet Take 1 tablet (12.5 mg) by mouth 2 times a day with meals.      cholecalciferol (Vitamin D-3) 50 MCG (2000 UT) tablet Take 1 tablet (2,000 Units) by mouth once daily.      clopidogrel (Plavix) 75 mg tablet Take 1 tablet (75 mg) by mouth once daily.       docusate sodium (Colace) 100 mg capsule Take 1 capsule (100 mg) by mouth 2 times a day as needed.      dulaglutide (Trulicity) 0.75 mg/0.5 mL pen injector Inject 1 Units under the skin 1 (one) time per week.      ferrous sulfate 325 (65 Fe) MG tablet Take 1 tablet by mouth once daily.      folic acid (Folvite) 1 mg tablet Take 1 tablet (1 mg) by mouth once daily.      lisinopril 20 mg tablet Take 1 tablet (20 mg) by mouth once daily. 30 tablet 11    pantoprazole (ProtoNix) 20 mg EC tablet Take 1 tablet (20 mg) by mouth once daily.      FreeStyle Lite Strips strip USE TO TEST ONCE A DAY      OneTouch Ultra Test strip test once a day      [DISCONTINUED] ergocalciferol (Vitamin D-2) 1.25 MG (54613 UT) capsule Take by mouth. TAKE 1 CAPSULE EVERY WEEK FOR 12 WEEKS AND THEN 2000 units of Vit D3 everyday      [DISCONTINUED] furosemide (Lasix) 40 mg tablet Take 1 tablet (40 mg) by mouth twice a day.      [DISCONTINUED] Vitamin D3 25 mcg (1,000 unit) capsule Take 2 capsules (50 mcg) by mouth once daily.       No current facility-administered medications on file prior to visit.        Assessment/Plan   Problem List Items Addressed This Visit       Hypertension associated with stage 4 chronic kidney disease due to type 2 diabetes mellitus (CMS/[facility])      Hypertensive chronic kidney disease with stage 1 through stage 4 chronic kidney disease, or unspecified chronic kidney disease         Patient currently has advanced CKD and was referred for assistance with a potassium binder.  Reviewed medication list with patient and answered all questions and concerns.  Completed test claims through insurance to find most cost effective potassium binder.  Veltassa and Lokelma are not covered under patient's insurance.  The best option for a potassium binder would be Sodium polystyrene sulfonate if needed based on potassium level at next nephrology visit.         Ladonna Rosenthal, PharmD    Continue all meds under the continuation of care  with the referring provider and clinical pharmacy team.

## 2024-01-24 ENCOUNTER — TELEPHONE (OUTPATIENT)
Dept: NEPHROLOGY | Facility: HOSPITAL | Age: 54
End: 2024-01-24
Payer: MEDICARE

## 2024-01-24 ENCOUNTER — DOCUMENTATION (OUTPATIENT)
Dept: NEPHROLOGY | Facility: HOSPITAL | Age: 54
End: 2024-01-24
Payer: MEDICARE

## 2024-02-01 ENCOUNTER — OFFICE VISIT (OUTPATIENT)
Dept: OPHTHALMOLOGY | Facility: CLINIC | Age: 54
End: 2024-02-01
Payer: COMMERCIAL

## 2024-02-01 DIAGNOSIS — H52.4 PRESBYOPIA: ICD-10-CM

## 2024-02-01 DIAGNOSIS — H54.40 BLINDNESS OF LEFT EYE WITH NORMAL VISION IN CONTRALATERAL EYE: ICD-10-CM

## 2024-02-01 DIAGNOSIS — H52.221 REGULAR ASTIGMATISM OF RIGHT EYE: ICD-10-CM

## 2024-02-01 DIAGNOSIS — H52.11 MYOPIA OF RIGHT EYE: Primary | ICD-10-CM

## 2024-02-01 PROCEDURE — 92004 COMPRE OPH EXAM NEW PT 1/>: CPT | Performed by: OPTOMETRIST

## 2024-02-01 PROCEDURE — 92015 DETERMINE REFRACTIVE STATE: CPT | Performed by: OPTOMETRIST

## 2024-02-01 ASSESSMENT — SLIT LAMP EXAM - LIDS
COMMENTS: GOOD POSITION
COMMENTS: GOOD POSITION

## 2024-02-01 ASSESSMENT — REFRACTION_MANIFEST
OS_SPHERE: BALANCE
OD_SPHERE: -0.25
OD_AXIS: 095
OD_CYLINDER: -0.75
OD_ADD: +2.75

## 2024-02-01 ASSESSMENT — ENCOUNTER SYMPTOMS
CONSTITUTIONAL NEGATIVE: 0
ALLERGIC/IMMUNOLOGIC NEGATIVE: 0
PSYCHIATRIC NEGATIVE: 0
NEUROLOGICAL NEGATIVE: 0
HEMATOLOGIC/LYMPHATIC NEGATIVE: 0
RESPIRATORY NEGATIVE: 0
CARDIOVASCULAR NEGATIVE: 0
MUSCULOSKELETAL NEGATIVE: 0
EYES NEGATIVE: 0
GASTROINTESTINAL NEGATIVE: 0
ENDOCRINE NEGATIVE: 1

## 2024-02-01 ASSESSMENT — VISUAL ACUITY
OD_SC: 20/40
OD_SC+: +2
OS_SC: NLP
METHOD: SNELLEN - LINEAR

## 2024-02-01 ASSESSMENT — EXTERNAL EXAM - RIGHT EYE: OD_EXAM: NORMAL

## 2024-02-01 ASSESSMENT — CONF VISUAL FIELD
OS_SUPERIOR_TEMPORAL_RESTRICTION: 1
OD_SUPERIOR_TEMPORAL_RESTRICTION: 0
OD_SUPERIOR_NASAL_RESTRICTION: 0
OS_INFERIOR_NASAL_RESTRICTION: 1
OD_NORMAL: 1
OS_INFERIOR_TEMPORAL_RESTRICTION: 1
OD_INFERIOR_TEMPORAL_RESTRICTION: 0
OD_INFERIOR_NASAL_RESTRICTION: 0
OS_SUPERIOR_NASAL_RESTRICTION: 1
METHOD: COUNTING FINGERS

## 2024-02-01 ASSESSMENT — TONOMETRY
OS_IOP_MMHG: 13
OD_IOP_MMHG: 16
IOP_METHOD: GOLDMANN APPLANATION

## 2024-02-01 ASSESSMENT — EXTERNAL EXAM - LEFT EYE: OS_EXAM: NORMAL

## 2024-02-01 ASSESSMENT — CUP TO DISC RATIO: OS_RATIO: NO VIEW

## 2024-02-01 NOTE — PROGRESS NOTES
Assessment/Plan   Diagnoses and all orders for this visit:  Myopia of right eye  Regular astigmatism of right eye  Presbyopia  Blindness of left eye with normal vision in contralateral eye  New spec rx released today per patient request. Monitor prn. Refraction billed today. Polycarbonate lenses w/ monocular precautions. F/up w/ Dr. Burton as scheduled.

## 2024-02-08 ENCOUNTER — APPOINTMENT (OUTPATIENT)
Dept: NEPHROLOGY | Facility: CLINIC | Age: 54
End: 2024-02-08
Payer: MEDICARE

## 2024-02-15 ENCOUNTER — LAB (OUTPATIENT)
Dept: LAB | Facility: LAB | Age: 54
End: 2024-02-15
Payer: MEDICARE

## 2024-02-15 ENCOUNTER — OFFICE VISIT (OUTPATIENT)
Dept: NEPHROLOGY | Facility: CLINIC | Age: 54
End: 2024-02-15
Payer: MEDICARE

## 2024-02-15 VITALS
HEIGHT: 64 IN | BODY MASS INDEX: 31.24 KG/M2 | WEIGHT: 183 LBS | DIASTOLIC BLOOD PRESSURE: 88 MMHG | SYSTOLIC BLOOD PRESSURE: 162 MMHG | HEART RATE: 85 BPM

## 2024-02-15 DIAGNOSIS — N18.4 HYPERTENSION ASSOCIATED WITH STAGE 4 CHRONIC KIDNEY DISEASE DUE TO TYPE 2 DIABETES MELLITUS (MULTI): ICD-10-CM

## 2024-02-15 DIAGNOSIS — I12.9 HYPERTENSION ASSOCIATED WITH STAGE 4 CHRONIC KIDNEY DISEASE DUE TO TYPE 2 DIABETES MELLITUS (MULTI): ICD-10-CM

## 2024-02-15 DIAGNOSIS — N18.4 TYPE 2 DIABETES MELLITUS WITH STAGE 4 CHRONIC KIDNEY DISEASE, WITHOUT LONG-TERM CURRENT USE OF INSULIN (MULTI): ICD-10-CM

## 2024-02-15 DIAGNOSIS — N18.4 HYPERTENSION ASSOCIATED WITH STAGE 4 CHRONIC KIDNEY DISEASE DUE TO TYPE 2 DIABETES MELLITUS (MULTI): Primary | ICD-10-CM

## 2024-02-15 DIAGNOSIS — I12.9 HYPERTENSION ASSOCIATED WITH STAGE 4 CHRONIC KIDNEY DISEASE DUE TO TYPE 2 DIABETES MELLITUS (MULTI): Primary | ICD-10-CM

## 2024-02-15 DIAGNOSIS — I10 HYPERTENSION, ESSENTIAL: ICD-10-CM

## 2024-02-15 DIAGNOSIS — E11.22 HYPERTENSION ASSOCIATED WITH STAGE 4 CHRONIC KIDNEY DISEASE DUE TO TYPE 2 DIABETES MELLITUS (MULTI): ICD-10-CM

## 2024-02-15 DIAGNOSIS — E11.22 TYPE 2 DIABETES MELLITUS WITH STAGE 4 CHRONIC KIDNEY DISEASE, WITHOUT LONG-TERM CURRENT USE OF INSULIN (MULTI): ICD-10-CM

## 2024-02-15 DIAGNOSIS — D63.1 ANEMIA IN STAGE 4 CHRONIC KIDNEY DISEASE (MULTI): ICD-10-CM

## 2024-02-15 DIAGNOSIS — E11.22 HYPERTENSION ASSOCIATED WITH STAGE 4 CHRONIC KIDNEY DISEASE DUE TO TYPE 2 DIABETES MELLITUS (MULTI): Primary | ICD-10-CM

## 2024-02-15 DIAGNOSIS — N30.01 ACUTE CYSTITIS WITH HEMATURIA: ICD-10-CM

## 2024-02-15 DIAGNOSIS — N18.4 ANEMIA IN STAGE 4 CHRONIC KIDNEY DISEASE (MULTI): ICD-10-CM

## 2024-02-15 LAB
ALBUMIN SERPL BCP-MCNC: 3.5 G/DL (ref 3.4–5)
ANION GAP SERPL CALC-SCNC: 14 MMOL/L (ref 10–20)
APPEARANCE UR: ABNORMAL
BACTERIA #/AREA URNS AUTO: ABNORMAL /HPF
BILIRUB UR STRIP.AUTO-MCNC: NEGATIVE MG/DL
BUN SERPL-MCNC: 36 MG/DL (ref 6–23)
CALCIUM SERPL-MCNC: 8.8 MG/DL (ref 8.6–10.6)
CHLORIDE SERPL-SCNC: 106 MMOL/L (ref 98–107)
CO2 SERPL-SCNC: 24 MMOL/L (ref 21–32)
COLOR UR: ABNORMAL
CREAT SERPL-MCNC: 2.66 MG/DL (ref 0.5–1.05)
CREAT UR-MCNC: 73 MG/DL (ref 20–320)
CREAT UR-MCNC: 73 MG/DL (ref 20–320)
EGFRCR SERPLBLD CKD-EPI 2021: 21 ML/MIN/1.73M*2
ERYTHROCYTE [DISTWIDTH] IN BLOOD BY AUTOMATED COUNT: 11.1 % (ref 11.5–14.5)
GLUCOSE SERPL-MCNC: 103 MG/DL (ref 74–99)
GLUCOSE UR STRIP.AUTO-MCNC: NORMAL MG/DL
HCT VFR BLD AUTO: 29.3 % (ref 36–46)
HGB BLD-MCNC: 10 G/DL (ref 12–16)
KETONES UR STRIP.AUTO-MCNC: NEGATIVE MG/DL
LEUKOCYTE ESTERASE UR QL STRIP.AUTO: ABNORMAL
MCH RBC QN AUTO: 29.6 PG (ref 26–34)
MCHC RBC AUTO-ENTMCNC: 34.1 G/DL (ref 32–36)
MCV RBC AUTO: 87 FL (ref 80–100)
MICROALBUMIN UR-MCNC: 1159.7 MG/L
MICROALBUMIN/CREAT UR: 1588.6 UG/MG CREAT
MUCOUS THREADS #/AREA URNS AUTO: ABNORMAL /LPF
NITRITE UR QL STRIP.AUTO: NEGATIVE
NRBC BLD-RTO: 0 /100 WBCS (ref 0–0)
PH UR STRIP.AUTO: 6 [PH]
PHOSPHATE SERPL-MCNC: 4.7 MG/DL (ref 2.5–4.9)
PLATELET # BLD AUTO: 241 X10*3/UL (ref 150–450)
POTASSIUM SERPL-SCNC: 5.1 MMOL/L (ref 3.5–5.3)
PROT UR STRIP.AUTO-MCNC: ABNORMAL MG/DL
PROT UR-ACNC: 174 MG/DL (ref 5–24)
PROT/CREAT UR: 2.38 MG/MG CREAT (ref 0–0.17)
RBC # BLD AUTO: 3.38 X10*6/UL (ref 4–5.2)
RBC # UR STRIP.AUTO: ABNORMAL /UL
RBC #/AREA URNS AUTO: ABNORMAL /HPF
SODIUM SERPL-SCNC: 139 MMOL/L (ref 136–145)
SP GR UR STRIP.AUTO: 1.01
SQUAMOUS #/AREA URNS AUTO: ABNORMAL /HPF
UROBILINOGEN UR STRIP.AUTO-MCNC: NORMAL MG/DL
WBC # BLD AUTO: 7.3 X10*3/UL (ref 4.4–11.3)
WBC #/AREA URNS AUTO: >50 /HPF
WBC CLUMPS #/AREA URNS AUTO: ABNORMAL /HPF

## 2024-02-15 PROCEDURE — 3077F SYST BP >= 140 MM HG: CPT | Performed by: NURSE PRACTITIONER

## 2024-02-15 PROCEDURE — 87086 URINE CULTURE/COLONY COUNT: CPT

## 2024-02-15 PROCEDURE — 82570 ASSAY OF URINE CREATININE: CPT

## 2024-02-15 PROCEDURE — 36415 COLL VENOUS BLD VENIPUNCTURE: CPT

## 2024-02-15 PROCEDURE — 3062F POS MACROALBUMINURIA REV: CPT | Performed by: NURSE PRACTITIONER

## 2024-02-15 PROCEDURE — 99215 OFFICE O/P EST HI 40 MIN: CPT | Performed by: NURSE PRACTITIONER

## 2024-02-15 PROCEDURE — 81001 URINALYSIS AUTO W/SCOPE: CPT

## 2024-02-15 PROCEDURE — 87186 SC STD MICRODIL/AGAR DIL: CPT

## 2024-02-15 PROCEDURE — 85027 COMPLETE CBC AUTOMATED: CPT

## 2024-02-15 PROCEDURE — 80069 RENAL FUNCTION PANEL: CPT

## 2024-02-15 PROCEDURE — 84156 ASSAY OF PROTEIN URINE: CPT

## 2024-02-15 PROCEDURE — 1036F TOBACCO NON-USER: CPT | Performed by: NURSE PRACTITIONER

## 2024-02-15 PROCEDURE — 3079F DIAST BP 80-89 MM HG: CPT | Performed by: NURSE PRACTITIONER

## 2024-02-15 PROCEDURE — 82043 UR ALBUMIN QUANTITATIVE: CPT

## 2024-02-15 ASSESSMENT — ENCOUNTER SYMPTOMS
NAUSEA: 0
VOMITING: 1
LIGHT-HEADEDNESS: 0
ACTIVITY CHANGE: 0
DIZZINESS: 0
APPETITE CHANGE: 0
DYSURIA: 0
ROS SKIN COMMENTS: NO ITCHING
SHORTNESS OF BREATH: 0

## 2024-02-15 NOTE — PATIENT INSTRUCTIONS
It is  nice to see you Yola  Your blood pressure is elevated today.  You have not taken your meds yet but it should be better controlled if you are taking amlodipine at night.  The 2 readings this week from home were good. Your reading this morning at home was about the same as here.  Please do not skip any pills  Please try to follow low sodium. And avoid processed foods and carry out food if possible.   Please return in one month.  I will call you if labs are abnormal for you

## 2024-02-15 NOTE — PROGRESS NOTES
Subjective   Patient ID: Yola Barillas is a 53 y.o. female who presents for Chronic Kidney Disease and Follow-up.  HPI  Pt presents for CKD stage 4 follow up with +proteinuria  No new symptoms or c/o since last visit.  Still does not have trulicity (for past month) d/t backorder at [facility]. Called and still not available.   Blood sugars have still been well controlled (130s per pt report); denies any high readings.   Did not take BP meds today  No ED visits or hospital admissions      Review of Systems   Constitutional:  Negative for activity change and appetite change.   Respiratory:  Negative for shortness of breath.    Cardiovascular:  Negative for chest pain and leg swelling.        NO PND or orthopnea   Gastrointestinal:  Positive for vomiting. Negative for nausea.        Had episode of vomiting last week which happens on occasion    Genitourinary:  Negative for dysuria.   Skin:  Negative for rash.        No itching   Neurological:  Negative for dizziness and light-headedness.       Objective   Physical Exam  Constitutional:       General: She is not in acute distress.     Appearance: Normal appearance. She is not ill-appearing.   HENT:      Mouth/Throat:      Mouth: Mucous membranes are moist.   Cardiovascular:      Rate and Rhythm: Normal rate and regular rhythm.      Heart sounds: Normal heart sounds.   Pulmonary:      Effort: Pulmonary effort is normal.      Breath sounds: Normal breath sounds.   Abdominal:      Palpations: Abdomen is soft.   Musculoskeletal:      Right lower leg: No edema.      Left lower leg: No edema.   Skin:     General: Skin is warm and dry.   Neurological:      Mental Status: She is alert and oriented to person, place, and time.      Comments: Dysphasia    Psychiatric:         Mood and Affect: Mood normal.         Behavior: Behavior normal.       Assessment/Plan   Diagnoses and all orders for this visit:  Hypertension associated with stage 4 chronic kidney disease due to type 2  diabetes mellitus (CMS/[facility])   BP above goal. Did not take meds today as she did not eat before leaving house  Appears euvolemic on exam. Wt stable  Reinforced low sodium food choices  No changes to meds   Last Cr 3.74 one month ago  Labs done today with Cr 2.66 and eGFR 21ml/min   K+ and bicarb wnl  Ca+ and phos within goal  Heavy albuminuria  Will RTC in 6weeks    Type 2 diabetes mellitus with stage 4 chronic kidney disease, without long-term current use of insulin (CMS/[facility])  Trulicity has been unavailable d/t backorder at pharmacy  Glucose checks 130s per pt   Will try to reach out to PCP  -     Referral to Nutrition Services; Future  Acute cystitis with hematuria  UA returned concerning for UTI (turbid and orange in color with leukocyte)  Some pressure when urinating   -     Urine culture; Future  Anemia of CKD stage 4  No indication at this time for ESAs.  Refer to ATC when necessary       EDILBERTO Stout-CNP 02/15/24 11:27 AM

## 2024-02-16 ENCOUNTER — OFFICE VISIT (OUTPATIENT)
Dept: OPHTHALMOLOGY | Facility: CLINIC | Age: 54
End: 2024-02-16
Payer: MEDICARE

## 2024-02-16 DIAGNOSIS — E11.22 HYPERTENSION ASSOCIATED WITH STAGE 4 CHRONIC KIDNEY DISEASE DUE TO TYPE 2 DIABETES MELLITUS (MULTI): ICD-10-CM

## 2024-02-16 DIAGNOSIS — N18.4 HYPERTENSION ASSOCIATED WITH STAGE 4 CHRONIC KIDNEY DISEASE DUE TO TYPE 2 DIABETES MELLITUS (MULTI): ICD-10-CM

## 2024-02-16 DIAGNOSIS — I12.9 HYPERTENSION ASSOCIATED WITH STAGE 4 CHRONIC KIDNEY DISEASE DUE TO TYPE 2 DIABETES MELLITUS (MULTI): ICD-10-CM

## 2024-02-16 DIAGNOSIS — H33.22 LEFT RETINAL DETACHMENT: ICD-10-CM

## 2024-02-16 PROCEDURE — 99213 OFFICE O/P EST LOW 20 MIN: CPT | Performed by: OPHTHALMOLOGY

## 2024-02-16 PROCEDURE — 92134 CPTRZ OPH DX IMG PST SGM RTA: CPT | Performed by: OPHTHALMOLOGY

## 2024-02-16 ASSESSMENT — VISUAL ACUITY
OD_SC: 20/30
METHOD: SNELLEN - LINEAR
OS_SC: NLP

## 2024-02-16 ASSESSMENT — TONOMETRY
OS_IOP_MMHG: 12
IOP_METHOD: TONOPEN
OD_IOP_MMHG: 13

## 2024-02-16 ASSESSMENT — SLIT LAMP EXAM - LIDS
COMMENTS: GOOD POSITION
COMMENTS: GOOD POSITION

## 2024-02-16 ASSESSMENT — CUP TO DISC RATIO: OS_RATIO: NO VIEW

## 2024-02-16 ASSESSMENT — EXTERNAL EXAM - LEFT EYE: OS_EXAM: NORMAL

## 2024-02-16 ASSESSMENT — EXTERNAL EXAM - RIGHT EYE: OD_EXAM: NORMAL

## 2024-02-16 NOTE — PROGRESS NOTES
Assessment/Plan   Diagnoses and all orders for this visit:  Left retinal detachment  -     OCT, Retina - OU - Both Eyes  Hypertension associated with stage 4 chronic kidney disease due to type 2 diabetes mellitus (CMS/HCC)    Impression        1 Retinal detachment ;left eye   no vlau ein surgery at this point  2 NS catarct OD   3 PDR OD inactive created by:Darryl Burton      Discussion        Hi quality OCT  scans obtained  signal good    OCT OD - Normal Foveal Contour, No Edema, IS/OS Junction Normal  OCT OS    additional commnents: created by:Darryl Burton    B-scan Ultrasound Interpretation -- Friday, August 18, 2023  OD/OS created by:Darryl Burton  No masses OS eviednt 2023          Plan  she has PRP laser OD and appers stable  wtach for NVE OD  left eye is non surgical  6m

## 2024-02-18 LAB — BACTERIA UR CULT: ABNORMAL

## 2024-02-19 DIAGNOSIS — N30.01 ACUTE CYSTITIS WITH HEMATURIA: Primary | ICD-10-CM

## 2024-02-19 RX ORDER — AMOXICILLIN AND CLAVULANATE POTASSIUM 500; 125 MG/1; MG/1
1 TABLET, FILM COATED ORAL 2 TIMES DAILY
Qty: 14 TABLET | Refills: 0 | Status: SHIPPED | OUTPATIENT
Start: 2024-02-19 | End: 2024-02-26

## 2024-02-20 ENCOUNTER — TELEPHONE (OUTPATIENT)
Dept: NEPHROLOGY | Facility: HOSPITAL | Age: 54
End: 2024-02-20
Payer: MEDICARE

## 2024-02-20 DIAGNOSIS — N13.5 URETERAL STRICTURE, RIGHT: Primary | ICD-10-CM

## 2024-02-20 DIAGNOSIS — N30.01 ACUTE CYSTITIS WITH HEMATURIA: ICD-10-CM

## 2024-02-20 PROBLEM — D64.9 ANEMIA, UNSPECIFIED: Status: ACTIVE | Noted: 2023-08-24

## 2024-02-20 PROBLEM — N18.4 ANEMIA IN STAGE 4 CHRONIC KIDNEY DISEASE (MULTI): Status: ACTIVE | Noted: 2023-08-24

## 2024-02-22 ENCOUNTER — APPOINTMENT (OUTPATIENT)
Dept: NEPHROLOGY | Facility: CLINIC | Age: 54
End: 2024-02-22
Payer: MEDICARE

## 2024-02-23 DIAGNOSIS — Z87.448 HISTORY OF HYDRONEPHROSIS: Primary | ICD-10-CM

## 2024-02-23 PROBLEM — R45.851 DEPRESSION WITH SUICIDAL IDEATION: Status: ACTIVE | Noted: 2022-05-11

## 2024-02-23 PROBLEM — N13.30 HYDRONEPHROSIS: Status: RESOLVED | Noted: 2022-09-11 | Resolved: 2024-02-23

## 2024-02-23 PROBLEM — R82.81 PYURIA: Status: RESOLVED | Noted: 2022-09-11 | Resolved: 2024-02-23

## 2024-02-23 PROBLEM — E11.36: Status: ACTIVE | Noted: 2022-05-24

## 2024-02-23 PROBLEM — I10 PRIMARY HYPERTENSION: Status: ACTIVE | Noted: 2021-11-17

## 2024-02-23 PROBLEM — T14.91XA SUICIDE ATTEMPT (MULTI): Status: RESOLVED | Noted: 2022-05-03 | Resolved: 2024-02-23

## 2024-02-23 PROBLEM — N13.5 OBSTRUCTION OF URETER: Status: RESOLVED | Noted: 2024-02-23 | Resolved: 2024-02-23

## 2024-02-23 PROBLEM — Z86.73 HISTORY OF CEREBROVASCULAR ACCIDENT: Status: ACTIVE | Noted: 2021-11-17

## 2024-02-23 PROBLEM — E11.9 DIABETES MELLITUS TYPE 2, CONTROLLED, WITHOUT COMPLICATIONS (MULTI): Status: ACTIVE | Noted: 2021-11-17

## 2024-02-23 PROBLEM — N13.5 URETERAL STRICTURE, RIGHT: Status: RESOLVED | Noted: 2022-12-07 | Resolved: 2024-02-23

## 2024-02-23 PROBLEM — H54.7 VISUAL IMPAIRMENT: Status: RESOLVED | Noted: 2023-08-24 | Resolved: 2024-02-23

## 2024-02-23 PROBLEM — I25.10 CORONARY ARTERY DISEASE INVOLVING NATIVE CORONARY ARTERY OF NATIVE HEART WITHOUT ANGINA PECTORIS: Status: RESOLVED | Noted: 2021-11-30 | Resolved: 2024-02-23

## 2024-02-23 PROBLEM — H33.20 RETINAL DETACHMENT: Status: ACTIVE | Noted: 2022-05-24

## 2024-02-23 PROBLEM — I21.4 NSTEMI (NON-ST ELEVATED MYOCARDIAL INFARCTION) (MULTI): Status: RESOLVED | Noted: 2021-11-21 | Resolved: 2024-02-23

## 2024-02-23 PROBLEM — I12.9 CHRONIC KIDNEY DISEASE DUE TO HYPERTENSION: Status: ACTIVE | Noted: 2024-02-23

## 2024-02-23 PROBLEM — E04.9 NODULAR GOITER: Status: ACTIVE | Noted: 2021-11-22

## 2024-02-23 PROBLEM — E66.811 OBESITY, CLASS I, BMI 30-34.9: Status: ACTIVE | Noted: 2021-11-17

## 2024-02-23 PROBLEM — N39.0 URINARY TRACT INFECTION: Status: RESOLVED | Noted: 2023-08-24 | Resolved: 2024-02-23

## 2024-02-23 PROBLEM — E66.9 OBESITY, CLASS I, BMI 30-34.9: Status: ACTIVE | Noted: 2021-11-17

## 2024-02-23 PROBLEM — F33.9 EPISODE OF RECURRENT MAJOR DEPRESSIVE DISORDER (CMS-HCC): Status: ACTIVE | Noted: 2022-09-23

## 2024-02-23 PROBLEM — N18.4 CKD (CHRONIC KIDNEY DISEASE) STAGE 4, GFR 15-29 ML/MIN (MULTI): Status: ACTIVE | Noted: 2022-09-23

## 2024-02-23 PROBLEM — I63.9: Status: ACTIVE | Noted: 2021-11-22

## 2024-02-23 PROBLEM — K21.9 GERD (GASTROESOPHAGEAL REFLUX DISEASE): Status: ACTIVE | Noted: 2022-09-23

## 2024-02-23 PROBLEM — E11.9 TYPE 2 DIABETES MELLITUS (MULTI): Status: ACTIVE | Noted: 2021-11-17

## 2024-02-23 PROBLEM — D25.9 UTERINE LEIOMYOMA: Status: RESOLVED | Noted: 2022-08-08 | Resolved: 2024-02-23

## 2024-02-23 PROBLEM — H25.9 AGE-RELATED CATARACT OF LEFT EYE: Status: ACTIVE | Noted: 2022-05-24

## 2024-02-23 PROBLEM — E78.9 LIPID DISORDER: Status: ACTIVE | Noted: 2021-11-30

## 2024-02-23 PROBLEM — F32.A DEPRESSION WITH SUICIDAL IDEATION: Status: ACTIVE | Noted: 2022-05-11

## 2024-02-23 PROBLEM — A41.51 SEPSIS DUE TO ESCHERICHIA COLI (MULTI): Status: RESOLVED | Noted: 2022-10-11 | Resolved: 2024-02-23

## 2024-02-23 PROBLEM — I25.10 ARTERIOSCLEROSIS OF CORONARY ARTERY: Status: RESOLVED | Noted: 2023-08-24 | Resolved: 2024-02-23

## 2024-03-01 ENCOUNTER — APPOINTMENT (OUTPATIENT)
Dept: NEPHROLOGY | Facility: CLINIC | Age: 54
End: 2024-03-01
Payer: MEDICARE

## 2024-03-06 ENCOUNTER — APPOINTMENT (OUTPATIENT)
Dept: RADIOLOGY | Facility: HOSPITAL | Age: 54
End: 2024-03-06
Payer: MEDICARE

## 2024-03-07 ENCOUNTER — TELEMEDICINE CLINICAL SUPPORT (OUTPATIENT)
Dept: NUTRITION | Facility: CLINIC | Age: 54
End: 2024-03-07
Payer: MEDICARE

## 2024-03-07 DIAGNOSIS — I12.9 CHRONIC KIDNEY DISEASE DUE TO HYPERTENSION: ICD-10-CM

## 2024-03-07 DIAGNOSIS — N18.4 CKD (CHRONIC KIDNEY DISEASE) STAGE 4, GFR 15-29 ML/MIN (MULTI): ICD-10-CM

## 2024-03-07 DIAGNOSIS — N18.4 HYPERTENSION ASSOCIATED WITH STAGE 4 CHRONIC KIDNEY DISEASE DUE TO TYPE 2 DIABETES MELLITUS (MULTI): ICD-10-CM

## 2024-03-07 DIAGNOSIS — I12.9 HYPERTENSION ASSOCIATED WITH STAGE 4 CHRONIC KIDNEY DISEASE DUE TO TYPE 2 DIABETES MELLITUS (MULTI): ICD-10-CM

## 2024-03-07 DIAGNOSIS — E11.22 TYPE 2 DIABETES MELLITUS WITH STAGE 4 CHRONIC KIDNEY DISEASE, WITHOUT LONG-TERM CURRENT USE OF INSULIN (MULTI): ICD-10-CM

## 2024-03-07 DIAGNOSIS — N18.4 TYPE 2 DIABETES MELLITUS WITH STAGE 4 CHRONIC KIDNEY DISEASE, WITHOUT LONG-TERM CURRENT USE OF INSULIN (MULTI): ICD-10-CM

## 2024-03-07 DIAGNOSIS — E11.22 HYPERTENSION ASSOCIATED WITH STAGE 4 CHRONIC KIDNEY DISEASE DUE TO TYPE 2 DIABETES MELLITUS (MULTI): ICD-10-CM

## 2024-03-07 DIAGNOSIS — I10 HYPERTENSION, ESSENTIAL: Primary | ICD-10-CM

## 2024-03-07 PROCEDURE — 97802 MEDICAL NUTRITION INDIV IN: CPT | Performed by: DIETITIAN, REGISTERED

## 2024-03-07 PROCEDURE — 97802 MEDICAL NUTRITION INDIV IN: CPT | Mod: GT,U1,95 | Performed by: DIETITIAN, REGISTERED

## 2024-03-07 NOTE — PATIENT INSTRUCTIONS
1) In metabolically stable patients with CKD stage 3-5, a protein restriction should be used with low protein diet of .55 to .6g/kg per day or lower. A .6 to .8g pro/kg body weight diet may be used with CKD patients with diabetes to maintain glycemic control. Calorie needs range from 25 to 35kcal/kg body weight.   2) Vitamin D supplementation vitamin D supplementation in the form of cholecalciferol or ergocalciferol should be used to correct vitamin D deficiency/insufficiency. Total elemental calcium intake should be 800-100mg/d (including dietary calcium, calcium supplementation, and calcium-based phosphate binders) for CKD 3-4 when vitamin D analogs are not being used. Vitamin C may be prescribed for patients with CKD 1-5D who are at risk of deficiency with 90mg/d for men and 75g/d for women. Sodium intake should be less than 2.3g per day to reduce blood pressure and help with volume control. Dietary phosphorus and potassium may be adjusted if serum levels are out of normal values with CKD 3-5.  3) A diet including ~2 g/d LC n-3 PUFA may be used to lower serum triglyceride levels during CKD 3-5.  Increased intake of a Mediterranean diet may be prescribed with CKD to improve lipids and increasing intake of fruit and vegetables may help with decrease body weight, blood pressure control and net acid production.

## 2024-03-07 NOTE — PROGRESS NOTES
Reason for Nutrition Visit:  Pt is a 54 y.o. female being seen for initial apt at remotely referred for   1. Hypertension, essential        2. Hypertension associated with stage 4 chronic kidney disease due to type 2 diabetes mellitus (CMS/[facility])  Referral to Nutrition Services      3. Type 2 diabetes mellitus with stage 4 chronic kidney disease, without long-term current use of insulin (CMS/[facility])  Referral to Nutrition Services      4. CKD (chronic kidney disease) stage 4, GFR 15-29 ml/min (CMS/[facility])             Past Medical Hx:  Patient Active Problem List   Diagnosis    Anemia    CAD (coronary artery disease)    DMII (diabetes mellitus, type 2) (CMS/[facility])    Fatigue    H/O non-ST elevation myocardial infarction (NSTEMI)    History of stroke    HLD (hyperlipidemia)    Hypertension, essential    Left retinal detachment    Lower extremity weakness    Poor vision    Proteinuria    Anemia in stage 4 chronic kidney disease (CMS/[facility])    UTI (urinary tract infection)    Vitamin D deficiency    Hypertension associated with stage 4 chronic kidney disease due to type 2 diabetes mellitus (CMS/[facility])    Age-related cataract of left eye    Cerebrovascular accident (CVA) due to vascular stenosis (CMS/[facility])    Chronic kidney disease due to hypertension    CKD (chronic kidney disease) stage 4, GFR 15-29 ml/min (CMS/[facility])    Depression with suicidal ideation    Diabetic cataract, associated with type 2 diabetes mellitus (CMS/[facility])    Episode of recurrent major depressive disorder (CMS/[facility])    GERD (gastroesophageal reflux disease)    Lipid disorder    Nodular goiter    Obesity, Class I, BMI 30-34.9    Diabetes mellitus type 2, controlled, without complications (CMS/[facility])    Type 2 diabetes mellitus (CMS/[facility])    History of cerebrovascular accident    Primary hypertension    Retinal detachment    History of hydronephrosis      Lab Results   Component Value Date    HGBA1C 5.1 12/01/2023    CHOL 221 (H) 03/09/2022    TRIG 141 03/09/2022    HDL  66.1 03/09/2022    GFRF 30 (A) 03/30/2022    CREATININE 2.66 (H) 02/15/2024    BUN 36 (H) 02/15/2024    K 5.1 02/15/2024    PHOS 4.7 02/15/2024      Food and Nutrition Hx:  Pt says she would like to know how to eat for CKD. She reports having a poor diet most of her. She has DM though it's well managed. She was taking Trulicity, but needs a refill for it. She mentions her appetite has been fluctuating and has noted weight gain the last few months. She normally doesn't follow meal times throughout the day and sometimes skips breakfast.    24 Diet Recall:  Meal 1 : strawberry applesauce with mandarin oranges  Meal 2 1pm: zoraida greens with sausage with green with potatoes  Meal 3 : zoraida greens with sausage with green with potatoes  Snacks: one fruit roll-ups; smoothie with V8 juice, spinach and dried smoothie mix  Beverages: 32 oz water, 2 bottles of diet pop (sprite and mountain dew), coffee or tea occasionally, sometimes diet red bull, no etoh    Allergies: None  Intolerance: None  Appetite: Fluctuates  Intake: >75%  GI Symptoms : None   Swallowing Difficulty: No problems with swallowing  Dentition : own    Types of Activities: Mostly Sedentary    Sleep duration/quality : 7+ hours and disrupted sleep  Sleep disorders: none    Supplements: Denies     Feelings of Hunger?: Yes and will eat  Physical Feeling: Stuffed and Sluggish  Binging: Occasionally  Cravings: Sweet  Energy Levels: Low    Food Preparation: Patient  Cooking Skills/Barriers: None reported  Grocery Shopping: Patient    Eating Out Type: Take Out  2-4 times per month  Convenience Foods: Frozen Meals  a few  times per week    Nutrition Focused Physical Exam:    Performed/Deferred: Deferred due to be being virtual visit    Muscle Wasting:  Temporal: None  Shoulder: None  None  Interosseous Muscle: None  Quadriceps: None    Loss of Subcutaneous Fat:  Eyes: None  Perioral: None  Triceps: None  Chest: None    Other Physical Findings:  Hair:  None  None  Mouth: None  Skin: None  Nails: None  none    Malnutrition Present: No    Estimated Energy Needs:    Weight Maintanence: 1698 kcal/day, MSJ=1415x1.2, 66 g/pro/day, and .8 g/pro/kg/day  Weight Gain Needs: 1448 kcal/day, MSJ=1698x250, 49 g/pro/day, and .6 g/pro/kg/day    Nutrition Diagnosis:    Diagnosis Statement 1:  Diagnosis Status: New  Diagnosis : Altered nutrition related lab values  related to  renal dysfunction  as evidenced by  low GFR (21), high BUN (36), creatinine (2.66) on 02/15/2024.    Diagnosis Statement 2:  Diagnosis Status: New  Diagnosis : Food and nutrition related knowledge deficit related to lack of or limited prior nutrition-related education as evidenced by no prior knowledge of need for food- and nutrition-related recommendations    Diagnosis Statement 3:   Diagnosis Status: New  Diagnosis : Inadequate fiber intake  related to food and nutrition related knowledge deficit concerning desirable quantities of fiber as evidenced by estimated intake of fiber that is insufficient when compared to recommended amounts (38 g/day for men and 25 g/day for women)    Nutrition Interventions:  Anti-Inflammatory Diet, Decreased Fat Diet, Increased Soluble Fiber, Increased Omega-3 Diet, Increased Vitamin D Intake, Low Saturated Fat Diet, and Plant Based Diet  Nutrition Counseling: Motivational Interviewing and Goal Setting  Coordination of Care: None    Nutrition Goals:  Nutrition Goals : Adequate fluid intake: 64 oz of water  Decrease intake of added sugars  Decrease intake of saturated fats  Decrease sodium intake  Increase awareness and respond to hunger cues  Increase awareness and respond to satiety cues  Reduce Kcal Intake  Weight Loss    Nutrition Recommendations:  Via teach back method patient verbalized understanding of the following topics:  1) Make a plate that is 1/2 filled with non-starchy vegetables (any vegetable other than potatoes, peas or corn), 1/4 filled with whole grain/starch  "and 1/4 lean protein. This will help increase fiber intake and keep you full after eating.  2) Discussed being aware of potassium from certain foods and why this can be concerning for your kidneys. If you are told your potassium level is high then you should limit your intake of diet pop, and smoothies.   3) Eat smaller portions of protein foods including meat, fish, eggs, seafood and dairy. Keep portions to 3 ounces (size of a deck of cards) or less. Focus on eating a more \"plant focused\" diet with more vegetables, fruit, whole grains, nuts, seeds, and beans.  4) Try finding ways to reduce processed foods such as choosing to make old fashioned oats and adding fruit instead of using instant oatmeal packets. Eat whole strawberries or applesauce instead of strawberry flavored applesauce. These changes to less processed will reduce sugar, salt and additives.  5) Work on maintaining meal times and not skipping meals. It will help regulate your appetite and avoid over-eating at times.    Educational Handouts: ADA Placemat and Kidney Diet Cookbook    Readiness to Change : Fair  Level of Understanding : Fair  Anticipated Compliant : Fair   "

## 2024-03-18 PROCEDURE — RXMED WILLOW AMBULATORY MEDICATION CHARGE

## 2024-03-19 ENCOUNTER — PHARMACY VISIT (OUTPATIENT)
Dept: PHARMACY | Facility: CLINIC | Age: 54
End: 2024-03-19
Payer: COMMERCIAL

## 2024-03-22 ENCOUNTER — APPOINTMENT (OUTPATIENT)
Dept: RADIOLOGY | Facility: CLINIC | Age: 54
End: 2024-03-22
Payer: MEDICARE

## 2024-03-27 ENCOUNTER — APPOINTMENT (OUTPATIENT)
Dept: PRIMARY CARE | Facility: CLINIC | Age: 54
End: 2024-03-27
Payer: MEDICARE

## 2024-03-28 ENCOUNTER — APPOINTMENT (OUTPATIENT)
Dept: NEPHROLOGY | Facility: CLINIC | Age: 54
End: 2024-03-28
Payer: MEDICARE

## 2024-04-01 ENCOUNTER — OFFICE VISIT (OUTPATIENT)
Dept: UROLOGY | Facility: CLINIC | Age: 54
End: 2024-04-01
Payer: MEDICARE

## 2024-04-01 VITALS — SYSTOLIC BLOOD PRESSURE: 182 MMHG | HEART RATE: 83 BPM | DIASTOLIC BLOOD PRESSURE: 95 MMHG

## 2024-04-01 DIAGNOSIS — N30.01 ACUTE CYSTITIS WITH HEMATURIA: ICD-10-CM

## 2024-04-01 DIAGNOSIS — N13.5 URETERAL STRICTURE, RIGHT: ICD-10-CM

## 2024-04-01 DIAGNOSIS — N17.9 ACUTE KIDNEY INJURY (CMS-HCC): Primary | ICD-10-CM

## 2024-04-01 PROCEDURE — 99214 OFFICE O/P EST MOD 30 MIN: CPT | Performed by: STUDENT IN AN ORGANIZED HEALTH CARE EDUCATION/TRAINING PROGRAM

## 2024-04-01 PROCEDURE — 3077F SYST BP >= 140 MM HG: CPT | Performed by: STUDENT IN AN ORGANIZED HEALTH CARE EDUCATION/TRAINING PROGRAM

## 2024-04-01 PROCEDURE — 99204 OFFICE O/P NEW MOD 45 MIN: CPT | Performed by: STUDENT IN AN ORGANIZED HEALTH CARE EDUCATION/TRAINING PROGRAM

## 2024-04-01 PROCEDURE — 1036F TOBACCO NON-USER: CPT | Performed by: STUDENT IN AN ORGANIZED HEALTH CARE EDUCATION/TRAINING PROGRAM

## 2024-04-01 PROCEDURE — 3062F POS MACROALBUMINURIA REV: CPT | Performed by: STUDENT IN AN ORGANIZED HEALTH CARE EDUCATION/TRAINING PROGRAM

## 2024-04-01 PROCEDURE — 3080F DIAST BP >= 90 MM HG: CPT | Performed by: STUDENT IN AN ORGANIZED HEALTH CARE EDUCATION/TRAINING PROGRAM

## 2024-04-01 NOTE — PROGRESS NOTES
Scribed for Dr. Augusto Bishop by Reyes Verde. I, Dr. Augusto Bishop have personally reviewed and agreed with the information entered by the Virtual Scribe. 04/01/24.    ASSESSMENT:  Problem List Items Addressed This Visit       UTI (urinary tract infection)    Relevant Orders    Measure post void residual (Completed)     Other Visit Diagnoses       Acute kidney injury (CMS/HCC)    -  Primary    Relevant Orders    CT abdomen pelvis wo IV contrast    Ureteral stricture, right        Relevant Orders    Measure post void residual (Completed)    CT abdomen pelvis wo IV contrast          1. H/o ureteral obstruction, BL - due to uterine fibroids, s/p hysterectomy and stents  2. Ureteral stricture, right  3. CKD4  4. T2DM  5. CAD with NSTEMI (2021)  6. CVA (2019)  7. HTN  8. HLD    PLAN:  Addressed my concern for possible recurrent ureteral stricture or ongoing obstruction in the setting of CKD4. Has had no urologic follow up over the past year or recent imaging.      Opts to proceed with a CT without contrast for further evaluation.   R/o obstruction, ureteral stricture, chronic vs worsening hydronephrosis.    RTC in a couple weeks for follow up and to review results.     All questions were answered to the patient’s satisfaction.  Patient agrees with the plan and wishes to proceed.  Continue follow-up for ongoing care of her chronic medical conditions.       History of Present Illness (HPI):  Yola presents as a new patient for an evaluation.  The patient’s EMR has been reviewed.  Lives in [place], [place].  Occupation: Disability   Believes she is on disability 2/2 her extensive past medical history.  Previously followed up at [facility] for urologic care.    History of of BL ureteral obstruction 2/2 extrinsic compression from fibroids in the setting of CKD4.     S/p bilateral ureteral stent placement with Dr. Stanley Shah. (9/29/2022)  Hospitalized for acute UTI and E. Coli bacteremia. (10/07 - 10/21/22)  Underwent  hysterectomy + L stent removal + R stent exchanged. (10/19/23)    There was concern for persistent obstruction on the right 2/2 mid ureteral stricture; thus R stent was exchanged. RPG showed narrowing below pelvic brim.     Had R ureteral stent for extended period of time. (Oct 2022-Jan 2023)  Was scheduled to have removed in Dec 2022; this was postponed 2/2 UTI.   Eventually her R stent was removed by Dr. Astorga. (Jan 2023)   RPG at that time showed mild hydronephrosis;  With no filling defects or ureteral obstruction.   No urologic follow up over the past year.     Continues follow up with Nephrology for her CKD.  Recently treated for acute UTI in Feb 2024.   Urine culture grew E. Coli. (02/15/24)  Last positive culture: +E.Coli (12/01/23)    TODAY: (04/01/24)  Reports she has been doing well overall.   Albeit, has had more recurring UTI's over recent months.   Last treated in Feb 2024, s/p antibiotics.   No UTI-related symptoms today.   Denies any flank pain, or gross hematuria.     Continues follow up with Nephrology for her CKD.     Renal function:  GFR 21 ; Cr 2.66. ~ (02/15/24)  GFR 14 ; Cr 3.74 ~ (01/10/24)  GFR 17 ; Cr 3.18 ~ (01/04/24)  GFR 16 ; Cr 3.30 ~ (12/01/23)    PMH: HTN, HLD, T2DM with proliferative retinopathy, CAD with NSTEMI (11/2021), progressive CKD, CVA (2019, on DAPT).  PSH: Hysterectomy  FH: Renal cancer (Brother).   SH: Non-smoker    Past Medical History:   Diagnosis Date    Arteriosclerosis of coronary artery 08/24/2023    Chronic kidney disease, stage 3b (CMS/HCC) 03/21/2022    Stage 3b chronic kidney disease    Coronary artery disease involving native coronary artery of native heart without angina pectoris 11/30/2021    Formatting of this note might be different from the original. Impression: 1. There is severe diffuse disease in the small ramus branch. 2. Mild diffuse disease in the LAD and RCA. Recommended Treatment: Medical Therapy Last Assessment & Plan: Formatting of this note might  be different from the original. Assessment: NSTEMI 11/2021 Impression: 1. There is severe diffuse disease in the small ramus br    Hydronephrosis 09/11/2022    Last Assessment & Plan: Formatting of this note might be different from the original. Scheduled for surgery on 9/29/2022    Obstruction of ureter 02/23/2024    Personal history of other endocrine, nutritional and metabolic disease 03/10/2022    History of vitamin D deficiency    Pyuria 09/11/2022    Sepsis due to Escherichia coli (CMS/[facility]) 10/11/2022    Last Assessment & Plan: Formatting of this note might be different from the original. +sepsis d/t E.Coli Pt states that she was hospitalized in 10/9/2022 for UTI, she was given IV antibiotics and most recent UC was negative. She denies any LUTS. Surgeon has ordered another UC, will obtain at pacc appt.    Ureteral stricture, right 12/07/2022    Last Assessment & Plan: Formatting of this note might be different from the original. Scheduled for surgery on 12/22/2022    Urinary tract infection 08/24/2023    Uterine leiomyoma 08/08/2022     Past Surgical History:   Procedure Laterality Date    IR CVC PICC  10/20/2022    IR CVC PICC    OTHER SURGICAL HISTORY  03/30/2022    No history of surgery     Family History   Problem Relation Name Age of Onset    Heart attack Mother       Social History     Tobacco Use   Smoking Status Never    Passive exposure: Never   Smokeless Tobacco Never     Current Outpatient Medications   Medication Sig Dispense Refill    amLODIPine (Norvasc) 10 mg tablet Take 1 tablet (10 mg) by mouth once daily.      aspirin 81 mg chewable tablet Chew 1 tablet (81 mg) once daily.      atorvastatin (Lipitor) 80 mg tablet Take 1 tablet (80 mg) by mouth once daily at bedtime.      bisacodyl (Dulcolax, bisacodyl,) 5 mg EC tablet Take 1 tablet (5 mg) by mouth once daily as needed.      buPROPion SR (Wellbutrin SR) 200 mg 12 hr tablet Take 1 tablet (200 mg) by mouth 2 times a day.      carvedilol (Coreg)  12.5 mg tablet Take 1 tablet (12.5 mg) by mouth 2 times a day with meals.      clopidogrel (Plavix) 75 mg tablet Take 1 tablet (75 mg) by mouth once daily.      docusate sodium (Colace) 100 mg capsule Take 1 capsule (100 mg) by mouth 2 times a day as needed.      dulaglutide (Trulicity) 0.75 mg/0.5 mL pen injector Inject 1 Units under the skin 1 (one) time per week.      ferrous sulfate 325 (65 Fe) MG tablet Take 1 tablet by mouth once daily.      folic acid (Folvite) 1 mg tablet Take 1 tablet (1 mg) by mouth once daily.      OneTouch Ultra Test strip test once a day      pantoprazole (ProtoNix) 20 mg EC tablet Take 1 tablet (20 mg) by mouth once daily.      Trulicity 0.75 mg/0.5 mL pen injector Inject 0.75 mg subcutaneously one time a week. INJECT 1 PEN WEEKLY 6 mL 1     No current facility-administered medications for this visit.     Allergies   Allergen Reactions    Sulfamethoxazole-Trimethoprim Angioedema     Past medical, surgical, family and social history in the chart was reviewed and is accurate including any additions to what is in this HPI.    REVIEW OF SYSTEMS (ROS):   Constitutional: denies any unintentional weight loss or change in strength.  Integumentary: denies any rashes or pruritus.  Eyes: denies any double vision or eye pain.  Ear/Nose/Mouth/Throat: denies any nosebleeds or gum bleeds.  Cardiovascular: denies any chest pain or syncope.  Respiratory: denies hemoptysis.  Gastrointestinal: denies nausea or vomiting.  Musculoskeletal: denies muscle cramping or weakness.  Neurologic: denies convulsions or seizures.  Hematologic/Lymphatic: denies bleeding tendencies.  Endocrine: denies heat/cold intolerance.  All other systems have been reviewed and are negative unless otherwise noted in the HPI.     OBJECTIVE:  Visit Vitals  BP (!) 182/95   Pulse 83     PHYSICAL EXAM:  Constitutional: No obvious distress.  Eyes: Non-injected conjunctiva, sclera clear, EOMI.  Ears/Nose/Mouth/Throat: No obvious drainage  per ears or nose.  Cardiovascular: Extremities are warm and well perfused. No edema, cyanosis or pallor.  Respiratory: No audible wheezing/stridor; respirations do not appear labored.  Gastrointestinal: Abdomen soft, not distended.  Musculoskeletal: Normal ROM of extremities.  Skin: No obvious rashes or open sores.  Neurologic: Alert and oriented, CN 2-12 grossly intact.  Psychiatric: Answers questions appropriately with normal affect.  Hematologic/Lymphatic/Immunologic: No obvious bruises or sites of spontaneous bleeding.  Genitourinary: No CVA tenderness, bladder not palpable.     LABS & IMAGING:  Lab Results   Component Value Date    WBC 7.3 02/15/2024    HGB 10.0 (L) 02/15/2024    HCT 29.3 (L) 02/15/2024     02/15/2024    CHOL 221 (H) 03/09/2022    TRIG 141 03/09/2022    HDL 66.1 03/09/2022    ALT 15 12/01/2023    AST 22 12/01/2023     02/15/2024    K 5.1 02/15/2024     02/15/2024    CREATININE 2.66 (H) 02/15/2024    BUN 36 (H) 02/15/2024    CO2 24 02/15/2024    TSH 0.55 03/09/2022    HGBA1C 5.1 12/01/2023     Scribed for Dr. Augusto Bishop by Reyes Verde.  I, Dr. Augusto Bishop have personally reviewed and agreed with the information entered by the Virtual Scribe. 04/01/24.

## 2024-04-11 ENCOUNTER — TELEMEDICINE CLINICAL SUPPORT (OUTPATIENT)
Dept: NUTRITION | Facility: CLINIC | Age: 54
End: 2024-04-11
Payer: MEDICARE

## 2024-04-11 DIAGNOSIS — N18.4 HYPERTENSION ASSOCIATED WITH STAGE 4 CHRONIC KIDNEY DISEASE DUE TO TYPE 2 DIABETES MELLITUS (MULTI): Primary | ICD-10-CM

## 2024-04-11 DIAGNOSIS — I12.9 HYPERTENSION ASSOCIATED WITH STAGE 4 CHRONIC KIDNEY DISEASE DUE TO TYPE 2 DIABETES MELLITUS (MULTI): Primary | ICD-10-CM

## 2024-04-11 DIAGNOSIS — E11.22 HYPERTENSION ASSOCIATED WITH STAGE 4 CHRONIC KIDNEY DISEASE DUE TO TYPE 2 DIABETES MELLITUS (MULTI): Primary | ICD-10-CM

## 2024-04-11 DIAGNOSIS — E11.9 CONTROLLED TYPE 2 DIABETES MELLITUS WITHOUT COMPLICATION, UNSPECIFIED WHETHER LONG TERM INSULIN USE (MULTI): ICD-10-CM

## 2024-04-11 PROCEDURE — 97803 MED NUTRITION INDIV SUBSEQ: CPT | Performed by: DIETITIAN, REGISTERED

## 2024-04-11 NOTE — PROGRESS NOTES
Reason for Nutrition Visit:  Pt is a 54 y.o. female being seen for initial apt at remotely referred for   1. Hypertension associated with stage 4 chronic kidney disease due to type 2 diabetes mellitus (CMS/[facility])        2. Controlled type 2 diabetes mellitus without complication, unspecified whether long term insulin use (CMS/[facility])          Past Medical Hx:  Patient Active Problem List   Diagnosis    Anemia    CAD (coronary artery disease)    DMII (diabetes mellitus, type 2) (CMS/[facility])    Fatigue    H/O non-ST elevation myocardial infarction (NSTEMI)    History of stroke    HLD (hyperlipidemia)    Hypertension, essential    Left retinal detachment    Lower extremity weakness    Poor vision    Proteinuria    Anemia in stage 4 chronic kidney disease (CMS/[facility])    UTI (urinary tract infection)    Vitamin D deficiency    Hypertension associated with stage 4 chronic kidney disease due to type 2 diabetes mellitus (CMS/[facility])    Age-related cataract of left eye    Cerebrovascular accident (CVA) due to vascular stenosis (CMS/[facility])    Chronic kidney disease due to hypertension    CKD (chronic kidney disease) stage 4, GFR 15-29 ml/min (CMS/[facility])    Depression with suicidal ideation    Diabetic cataract, associated with type 2 diabetes mellitus (CMS/[facility])    Episode of recurrent major depressive disorder (CMS/[facility])    GERD (gastroesophageal reflux disease)    Lipid disorder    Nodular goiter    Obesity, Class I, BMI 30-34.9    Diabetes mellitus type 2, controlled, without complications (CMS/[facility])    Type 2 diabetes mellitus (CMS/[facility])    History of cerebrovascular accident    Primary hypertension    Retinal detachment    History of hydronephrosis      Lab Results   Component Value Date    HGBA1C 5.1 12/01/2023    CHOL 221 (H) 03/09/2022    TRIG 141 03/09/2022    HDL 66.1 03/09/2022    GFRF 30 (A) 03/30/2022    CREATININE 2.66 (H) 02/15/2024    BUN 36 (H) 02/15/2024    K 5.1 02/15/2024    PHOS 4.7 02/15/2024      Past Food and Nutrition  "Hx:  Pt says she would like to know how to eat for CKD. She reports having a poor diet most of her. She has DM though it's well managed. She was taking Trulicity, but needs a refill for it. She mentions her appetite has been fluctuating and has noted weight gain the last few months. She normally doesn't follow meal times throughout the day and sometimes skips breakfast.    Food and Nutrition Hx:   Pt shares she switched to  old fashioned oats instead of the packets. She has been trying to include more vegetables. She shares yesterday she was feeling \"off\" and doesn't have much of an appetite some days, especially in the morning. She tells that she does stay up late and often gets more hungry in the evenings. She mentions some concerns with finding foods that are okay to eat that are good for diabetes, low in potassium and low in cholesterol.    24 Hr Diet Recall:  Meal 1: applesauce with jello cup  Meal 2: skipped  Meal 3: ramen noodles and applesauce with jello cup    24 Diet Recall:  Meal 1 : strawberry applesauce with mandarin oranges  Meal 2 1pm: zoraida greens with sausage with green with potatoes  Meal 3 : zoraida greens with sausage with green with potatoes  Snacks: one fruit roll-ups; smoothie with V8 juice, spinach and dried smoothie mix  Beverages: 32 oz water, 2 bottles of diet pop (sprite and mountain dew), coffee or tea occasionally, sometimes diet red bull, no etoh    Allergies: None  Intolerance: None  Appetite: Fluctuates  Intake: >75%  GI Symptoms : None   Swallowing Difficulty: No problems with swallowing  Dentition : own    Types of Activities: Mostly Sedentary    Sleep duration/quality : 7+ hours and disrupted sleep  Sleep disorders: none    Supplements: Denies     Feelings of Hunger?: Yes and will eat  Physical Feeling: Stuffed and Sluggish  Binging: Occasionally  Cravings: Sweet  Energy Levels: Low    Food Preparation: Patient  Cooking Skills/Barriers: None reported  Grocery Shopping: " Patient    Eating Out Type: Take Out  2-4 times per month  Convenience Foods: Frozen Meals  a few  times per week    Nutrition Focused Physical Exam:    Performed/Deferred: Deferred due to be being virtual visit    Muscle Wasting:  Temporal: None  Shoulder: None  None  Interosseous Muscle: None  Quadriceps: None    Loss of Subcutaneous Fat:  Eyes: None  Perioral: None  Triceps: None  Chest: None    Other Physical Findings:  Hair: None  None  Mouth: None  Skin: None  Nails: None  none    Malnutrition Present: No    Estimated Energy Needs:    Weight Maintanence: 1698 kcal/day, MSJ=1415x1.2, 66 g/pro/day, and .8 g/pro/kg/day  Weight Gain Needs: 1448 kcal/day, MSJ=1698x250, 49 g/pro/day, and .6 g/pro/kg/day    Nutrition Diagnosis:    Diagnosis Statement 1:  Diagnosis Status: Ongoing  Diagnosis : Altered nutrition related lab values  related to  renal dysfunction  as evidenced by  low GFR (21), high BUN (36), creatinine (2.66) on 02/15/2024.    Diagnosis Statement 2:  Diagnosis Status: Ongoing  Diagnosis : Food and nutrition related knowledge deficit related to lack of or limited prior nutrition-related education as evidenced by no prior knowledge of need for food- and nutrition-related recommendations    Diagnosis Statement 3:   Diagnosis Status: Ongoing  Diagnosis : Inadequate fiber intake  related to food and nutrition related knowledge deficit concerning desirable quantities of fiber as evidenced by estimated intake of fiber that is insufficient when compared to recommended amounts (38 g/day for men and 25 g/day for women)    Nutrition Interventions:  Anti-Inflammatory Diet, Decreased Fat Diet, Increased Soluble Fiber, Increased Omega-3 Diet, Increased Vitamin D Intake, Low Saturated Fat Diet, and Plant Based Diet  Nutrition Counseling: Motivational Interviewing and Goal Setting  Coordination of Care: None    Nutrition Goals:  Nutrition Goals : Adequate fluid intake: 64 oz of water  Decrease intake of added  sugars  Decrease intake of saturated fats  Decrease sodium intake  Increase awareness and respond to hunger cues  Increase awareness and respond to satiety cues  Reduce Kcal Intake  Weight Loss    Nutrition Recommendations:  Via teach back method patient verbalized understanding of the following topics:  1) Emphasized to pt importance of not skipping meals. Consider a nutrition shake to drink on days that pt has a low appetite. Consider other snacks such as banana or apple with peanut butter, or hard boiled eggs with crackers.  2) Advised pt that it's okay to incorporate foods like whole grain bread, fruit, or eggs even with concerns of DM, potassium or cholesterol if consumed in moderation. It's more essential that pt has a varied diet that to avoid these foods completely as they offer more nutrients that are more important for pt to get than to avoid.  3) Increase fiber intake to help lower cholesterol. Focus on foods like oats, whole wheat bread, beans, avocado, brussel sprouts, broccoli, cauliflower, and fruit to help.    Educational Handouts: ADA Placemat    Readiness to Change : Fair  Level of Understanding : Fair  Anticipated Compliant : Fair

## 2024-04-15 PROCEDURE — RXMED WILLOW AMBULATORY MEDICATION CHARGE

## 2024-04-16 ENCOUNTER — HOSPITAL ENCOUNTER (OUTPATIENT)
Dept: RADIOLOGY | Facility: HOSPITAL | Age: 54
Discharge: HOME | End: 2024-04-16
Payer: MEDICARE

## 2024-04-16 ENCOUNTER — PHARMACY VISIT (OUTPATIENT)
Dept: PHARMACY | Facility: CLINIC | Age: 54
End: 2024-04-16
Payer: COMMERCIAL

## 2024-04-16 DIAGNOSIS — N13.5 URETERAL STRICTURE, RIGHT: ICD-10-CM

## 2024-04-16 DIAGNOSIS — N17.9 ACUTE KIDNEY INJURY (CMS-HCC): ICD-10-CM

## 2024-04-16 PROCEDURE — 74176 CT ABD & PELVIS W/O CONTRAST: CPT | Performed by: RADIOLOGY

## 2024-04-16 PROCEDURE — 74176 CT ABD & PELVIS W/O CONTRAST: CPT

## 2024-04-24 ENCOUNTER — OFFICE VISIT (OUTPATIENT)
Dept: PRIMARY CARE | Facility: CLINIC | Age: 54
End: 2024-04-24
Payer: MEDICARE

## 2024-04-24 VITALS
DIASTOLIC BLOOD PRESSURE: 61 MMHG | HEART RATE: 73 BPM | SYSTOLIC BLOOD PRESSURE: 112 MMHG | WEIGHT: 183.6 LBS | HEIGHT: 64 IN | BODY MASS INDEX: 31.34 KG/M2 | OXYGEN SATURATION: 97 %

## 2024-04-24 DIAGNOSIS — I10 HYPERTENSION, ESSENTIAL: ICD-10-CM

## 2024-04-24 DIAGNOSIS — N18.4 ANEMIA IN STAGE 4 CHRONIC KIDNEY DISEASE (MULTI): ICD-10-CM

## 2024-04-24 DIAGNOSIS — E11.22 TYPE 2 DIABETES MELLITUS WITH STAGE 4 CHRONIC KIDNEY DISEASE, WITHOUT LONG-TERM CURRENT USE OF INSULIN (MULTI): Primary | ICD-10-CM

## 2024-04-24 DIAGNOSIS — Z86.73 H/O: CVA (CEREBROVASCULAR ACCIDENT): ICD-10-CM

## 2024-04-24 DIAGNOSIS — K59.09 CHRONIC CONSTIPATION: ICD-10-CM

## 2024-04-24 DIAGNOSIS — D63.1 ANEMIA IN STAGE 4 CHRONIC KIDNEY DISEASE (MULTI): ICD-10-CM

## 2024-04-24 DIAGNOSIS — N18.4 TYPE 2 DIABETES MELLITUS WITH STAGE 4 CHRONIC KIDNEY DISEASE, WITHOUT LONG-TERM CURRENT USE OF INSULIN (MULTI): Primary | ICD-10-CM

## 2024-04-24 PROCEDURE — 1036F TOBACCO NON-USER: CPT | Performed by: STUDENT IN AN ORGANIZED HEALTH CARE EDUCATION/TRAINING PROGRAM

## 2024-04-24 PROCEDURE — 99215 OFFICE O/P EST HI 40 MIN: CPT | Performed by: STUDENT IN AN ORGANIZED HEALTH CARE EDUCATION/TRAINING PROGRAM

## 2024-04-24 PROCEDURE — 3078F DIAST BP <80 MM HG: CPT | Performed by: STUDENT IN AN ORGANIZED HEALTH CARE EDUCATION/TRAINING PROGRAM

## 2024-04-24 PROCEDURE — 3074F SYST BP LT 130 MM HG: CPT | Performed by: STUDENT IN AN ORGANIZED HEALTH CARE EDUCATION/TRAINING PROGRAM

## 2024-04-24 PROCEDURE — 3062F POS MACROALBUMINURIA REV: CPT | Performed by: STUDENT IN AN ORGANIZED HEALTH CARE EDUCATION/TRAINING PROGRAM

## 2024-04-24 RX ORDER — CLOPIDOGREL BISULFATE 75 MG/1
75 TABLET ORAL DAILY
Qty: 90 TABLET | Refills: 3 | Status: SHIPPED | OUTPATIENT
Start: 2024-04-24

## 2024-04-24 RX ORDER — AMLODIPINE BESYLATE 10 MG/1
10 TABLET ORAL DAILY
Qty: 90 TABLET | Refills: 3 | Status: SHIPPED | OUTPATIENT
Start: 2024-04-24

## 2024-04-24 RX ORDER — FOLIC ACID 1 MG/1
1 TABLET ORAL DAILY
Qty: 90 TABLET | Refills: 3 | Status: SHIPPED | OUTPATIENT
Start: 2024-04-24

## 2024-04-24 RX ORDER — BLOOD SUGAR DIAGNOSTIC
STRIP MISCELLANEOUS
Qty: 100 STRIP | Refills: 3 | Status: SHIPPED | OUTPATIENT
Start: 2024-04-24

## 2024-04-24 RX ORDER — FERROUS SULFATE 325(65) MG
1 TABLET ORAL DAILY
Qty: 90 TABLET | Refills: 3 | Status: SHIPPED | OUTPATIENT
Start: 2024-04-24

## 2024-04-24 RX ORDER — DULAGLUTIDE 0.75 MG/.5ML
INJECTION, SOLUTION SUBCUTANEOUS
Qty: 6 ML | Refills: 1 | Status: SHIPPED | OUTPATIENT
Start: 2024-04-24

## 2024-04-24 RX ORDER — BISACODYL 5 MG
5 TABLET, DELAYED RELEASE (ENTERIC COATED) ORAL DAILY PRN
Qty: 90 TABLET | Refills: 3 | Status: SHIPPED | OUTPATIENT
Start: 2024-04-24

## 2024-04-24 RX ORDER — ATORVASTATIN CALCIUM 80 MG/1
80 TABLET, FILM COATED ORAL NIGHTLY
Qty: 90 TABLET | Refills: 3 | Status: SHIPPED | OUTPATIENT
Start: 2024-04-24

## 2024-04-24 RX ORDER — CARVEDILOL 12.5 MG/1
12.5 TABLET ORAL
Qty: 180 TABLET | Refills: 3 | Status: SHIPPED | OUTPATIENT
Start: 2024-04-24

## 2024-04-24 NOTE — PROGRESS NOTES
"Subjective   Patient ID: Yola Doctor is a 54 y.o. female who presents for Follow-up (Pt declined flu shot. Discuss medications and RX refills. ).        HPI    53y/o female with HTN, CVA, NSTEMI , CAD , CKD stage 4   Last seen in April 2022 in my office   Was receiving care elsewhere       Visit Vitals  /61   Pulse 73   Ht 1.626 m (5' 4\")   Wt 83.3 kg (183 lb 9.6 oz)   SpO2 97%   BMI 31.51 kg/m²   Smoking Status Never   BSA 1.94 m²      No LMP recorded.   Current Outpatient Medications   Medication Instructions    amLODIPine (NORVASC) 10 mg, oral, Daily    aspirin 81 mg chewable tablet 1 tablet, oral, Daily    atorvastatin (LIPITOR) 80 mg, oral, Nightly    bisacodyl (DULCOLAX (BISACODYL)) 5 mg, oral, Daily PRN    buPROPion SR (Wellbutrin SR) 200 mg 12 hr tablet 1 tablet, oral, 2 times daily    carvedilol (COREG) 12.5 mg, oral, 2 times daily with meals    clopidogrel (PLAVIX) 75 mg, oral, Daily    docusate sodium (Colace) 100 mg capsule 1 capsule, oral, 2 times daily PRN    ferrous sulfate, 325 mg ferrous sulfate, tablet 1 tablet, oral, Daily    folic acid (FOLVITE) 1 mg, oral, Daily    OneTouch Ultra Test strip test once a day    pantoprazole (ProtoNix) 20 mg EC tablet 1 tablet, oral, Daily    Trulicity 0.75 mg/0.5 mL pen injector Inject 0.75 mg subcutaneously one time a week. INJECT 1 PEN WEEKLY      Social History     Tobacco Use    Smoking status: Never     Passive exposure: Never    Smokeless tobacco: Never   Substance Use Topics    Alcohol use: Never        Review of Systems    Constitutional : No feeling poorly / fevers/ chills / night sweats/ fatigue   Cardiovascular : No CP /Palpitations/ lower extremity edema / syncope   Respiratory : No Cough /MCKINLEY/Dyspnea at rest   Gastrointestinal : No abd pain / N/V  No bloody stools/ melena / constipation  Endo : No polyuria/polydipsia/ muscle weakness / sluggishness   CNS: No confusion / HA/ tingling/ numbness/ weakness of extremities  Psychiatric: No " anxiety/ depression/ SI/HI    All other systems have been reviewed and are negative for complaint       Physical Exam    Constitutional : Vitals reviewed. Alert and in no distress  Cardiovascular : RRR, Normal S1, S2, No pericardial rub/ gallop, no peripheral edema   Pulmonary: No respiratory distress, CTAB   MSK : Normal gait and station , strength and tone   Skin: Warm to touch ,  normal skin turgor   Neurologic : CNs 2-12 grossly intact , no obvious FNDs  Psych : A,Ox3, normal mood and affect      Assessment/Plan   Diagnoses and all orders for this visit:  Type 2 diabetes mellitus with stage 4 chronic kidney disease, without long-term current use of insulin (Multi)  -     Trulicity 0.75 mg/0.5 mL pen injector; Inject 0.75 mg subcutaneously one time a week. INJECT 1 PEN WEEKLY  -     OneTouch Ultra Test strip; test once a day  -     Hemoglobin A1C; Future  Hypertension, essential  -     amLODIPine (Norvasc) 10 mg tablet; Take 1 tablet (10 mg) by mouth once daily.  Anemia in stage 4 chronic kidney disease (Multi)  -     ferrous sulfate, 325 mg ferrous sulfate, tablet; Take 1 tablet by mouth once daily.  -     folic acid (Folvite) 1 mg tablet; Take 1 tablet (1 mg) by mouth once daily.  H/O: CVA (cerebrovascular accident)  -     atorvastatin (Lipitor) 80 mg tablet; Take 1 tablet (80 mg) by mouth once daily at bedtime.  -     carvedilol (Coreg) 12.5 mg tablet; Take 1 tablet (12.5 mg) by mouth 2 times a day with meals.  -     clopidogrel (Plavix) 75 mg tablet; Take 1 tablet (75 mg) by mouth once daily.  -     Lipid Panel; Future  -     TSH with reflex to Free T4 if abnormal; Future  Chronic constipation  -     bisacodyl (Dulcolax, bisacodyl,) 5 mg EC tablet; Take 1 tablet (5 mg) by mouth once daily as needed for constipation.      55y/o female with HTN,  h/oCVA,  h/o NSTEMI , CAD , CKD stage 4 , anemia of chronic disease         Chronic medical conditions: Reviewed , assessed , stable, meds refilled.   - HTN  - HPL   -  H.o CVA   - DM2   - CKD stage 4   Est with nephro   - Takes ppi prn   - Est with psych elsewhere for depression       Labs as above     High complexity , visit after 2 years, multiple medical conditions addressed, chart reviewed since the last visit 2 years ago , hence [id number]     RTOT in 6m or sooner if neede   Conditions addressed and mgmt as noted above.  Pertinent labs, images/ imaging reports , chart review was done .   Age appropriate labs / labs for mgmt of chronic medical conditions ordered, further mgmt pending the results.

## 2024-04-25 ENCOUNTER — OFFICE VISIT (OUTPATIENT)
Dept: NEPHROLOGY | Facility: CLINIC | Age: 54
End: 2024-04-25
Payer: MEDICARE

## 2024-04-25 VITALS
WEIGHT: 179 LBS | HEIGHT: 63 IN | BODY MASS INDEX: 31.71 KG/M2 | HEART RATE: 81 BPM | SYSTOLIC BLOOD PRESSURE: 143 MMHG | DIASTOLIC BLOOD PRESSURE: 75 MMHG

## 2024-04-25 DIAGNOSIS — I12.9 HYPERTENSIVE KIDNEY DISEASE WITH STAGE 4 CHRONIC KIDNEY DISEASE (MULTI): ICD-10-CM

## 2024-04-25 DIAGNOSIS — D63.1 ANEMIA IN STAGE 4 CHRONIC KIDNEY DISEASE (MULTI): ICD-10-CM

## 2024-04-25 DIAGNOSIS — N18.4 ANEMIA IN STAGE 4 CHRONIC KIDNEY DISEASE (MULTI): ICD-10-CM

## 2024-04-25 DIAGNOSIS — N18.4 HYPERTENSIVE KIDNEY DISEASE WITH STAGE 4 CHRONIC KIDNEY DISEASE (MULTI): ICD-10-CM

## 2024-04-25 DIAGNOSIS — I10 HYPERTENSION, ESSENTIAL: Primary | ICD-10-CM

## 2024-04-25 PROCEDURE — 3078F DIAST BP <80 MM HG: CPT | Performed by: NURSE PRACTITIONER

## 2024-04-25 PROCEDURE — 3077F SYST BP >= 140 MM HG: CPT | Performed by: NURSE PRACTITIONER

## 2024-04-25 PROCEDURE — 3049F LDL-C 100-129 MG/DL: CPT | Performed by: NURSE PRACTITIONER

## 2024-04-25 PROCEDURE — 99214 OFFICE O/P EST MOD 30 MIN: CPT | Performed by: NURSE PRACTITIONER

## 2024-04-25 PROCEDURE — 3062F POS MACROALBUMINURIA REV: CPT | Performed by: NURSE PRACTITIONER

## 2024-04-25 PROCEDURE — 3044F HG A1C LEVEL LT 7.0%: CPT | Performed by: NURSE PRACTITIONER

## 2024-04-25 ASSESSMENT — ENCOUNTER SYMPTOMS
SHORTNESS OF BREATH: 0
UNEXPECTED WEIGHT CHANGE: 0
APPETITE CHANGE: 0
ACTIVITY CHANGE: 0
VOMITING: 0
ROS SKIN COMMENTS: NO ITCHING
HEMATURIA: 0
NAUSEA: 0
DIZZINESS: 0
LIGHT-HEADEDNESS: 0

## 2024-04-25 NOTE — PATIENT INSTRUCTIONS
It was nice to see you Fabi!  Your blood pressure is ok, varies a great deal as it was 112/ yesterday at PCP office  It is ok to spread meds out during the day but make sure you take them every day.  Take your pressure a few days a week. Take it a few times on those days.  Write it down   If you have pressures consistently >130-140/  please call me. (I know you are hesitant to call with 130s   Please use your pill stephens to track that you are taking your medications   Please return in 3 months.  Please have blood work done in few weeks as planned

## 2024-04-25 NOTE — PROGRESS NOTES
Subjective   Patient ID: Yola Barillas is a 54 y.o. female who presents for Chronic Kidney Disease and Follow-up.  HPI  Pt with CKD stage 4 for follow up.   Last seen in Feb  Cr at that time was 2.66 with eGFR 21ml/min which was improvement from Jan 3.74 and eGFR 14ml/min  Saw PCP yesterday.  Saw urology 4/1. Had CT on 4/16 which did showed no obstruction or hydronephrosis.  No new symptoms or c/o   No ED visits or hospital admisssions   Met with dietician few weeks ago and found it very helpful   Does not use NSAIDS    Review of Systems   Constitutional:  Negative for activity change, appetite change and unexpected weight change.   Respiratory:  Negative for shortness of breath.    Cardiovascular:  Negative for chest pain and leg swelling.        No PND or orthopnea    Gastrointestinal:  Negative for nausea and vomiting.   Genitourinary:  Negative for hematuria.        Occ burning with urination  Following with urology   Skin:  Negative for rash.        No itching    Neurological:  Negative for dizziness and light-headedness.        Has on occasion felt lightheaded when taking meds all at once.  She spreads them out during day        Objective   Physical Exam  Constitutional:       General: She is not in acute distress.     Appearance: Normal appearance. She is not ill-appearing.   HENT:      Mouth/Throat:      Mouth: Mucous membranes are moist.   Cardiovascular:      Rate and Rhythm: Normal rate and regular rhythm.      Heart sounds: Normal heart sounds.   Pulmonary:      Effort: Pulmonary effort is normal.      Breath sounds: Normal breath sounds.   Abdominal:      Palpations: Abdomen is soft.   Musculoskeletal:      Cervical back: Normal range of motion.      Right lower leg: No edema.      Left lower leg: No edema.   Neurological:      Mental Status: She is alert.      Comments: Word finding difficulty at times.    Psychiatric:         Mood and Affect: Mood normal.         Behavior: Behavior normal.   BP  "143/75 (BP Location: Right arm, Patient Position: Sitting)   Pulse 81   Ht 1.6 m (5' 3\")   Wt 81.2 kg (179 lb)   BMI 31.71 kg/m²      Assessment/Plan   Hypertensive CKD stage 4  No obvious uremic symptoms  Repeat labs  Education done in past with pt still choosing PD  Refer to [facility] clinic when indicated  Return to clinic in 3mths  HTN  BP fluctuates each visit.  Med adherence fluctuates d/t side effects.  Encouraged to monitor BP at home and record. To report back if BP consistently above goal  Reinforced low sodium diet   Appear euvolemic on exam  Wt stable.    Anemia  No indication at this time for ESAs, will monitor           BRANT Stout 04/25/24 12:13 PM   "

## 2024-04-29 ENCOUNTER — OFFICE VISIT (OUTPATIENT)
Dept: UROLOGY | Facility: CLINIC | Age: 54
End: 2024-04-29
Payer: MEDICARE

## 2024-04-29 DIAGNOSIS — Z87.448 HISTORY OF HYDRONEPHROSIS: Primary | ICD-10-CM

## 2024-04-29 PROCEDURE — 3062F POS MACROALBUMINURIA REV: CPT | Performed by: STUDENT IN AN ORGANIZED HEALTH CARE EDUCATION/TRAINING PROGRAM

## 2024-04-29 PROCEDURE — 99213 OFFICE O/P EST LOW 20 MIN: CPT | Performed by: STUDENT IN AN ORGANIZED HEALTH CARE EDUCATION/TRAINING PROGRAM

## 2024-04-29 NOTE — PROGRESS NOTES
Scribed for Dr. Augusto Bishop by Reyes Verde. I, Dr. Augusto Bishop have personally reviewed and agreed with the information entered by the Virtual Scribe. 04/29/24.    ASSESSMENT:  Problem List Items Addressed This Visit       History of hydronephrosis - Primary       1. H/o ureteral obstruction, BL - due to uterine fibroids, s/p hysterectomy and stents  2. Ureteral stricture, right  3. CKD4  4. T2DM  5. CAD with NSTEMI (2021)  6. CVA (2019)    PLAN:  #UPJO  Provided reassurance that her CT imaging (04/16/24) was negative for signs of obstruction or hydronephrosis. I am not convinced her renal calcifications are stones, reasonable to discontinue further surveillance of this.     #Cystitis - resolved  Was treated for E. Coli UTI's in Dec 2023 and Feb 2024.   If more frequent UTI's can refer to Dr. Vuong for management.     TO REVIEW: Last visit  Addressed my concern for possible recurrent ureteral stricture or ongoing obstruction in the setting of CKD4. Has had no urologic follow up over the past year or recent imaging.      - Opts to proceed with a CT without contrast for further evaluation.   - R/o obstruction, ureteral stricture, chronic vs worsening hydronephrosis.    - RTC in a couple weeks for follow up and to review results.     All questions were answered to the patient’s satisfaction.  Patient agrees with the plan and wishes to proceed.  Continue follow-up for ongoing care of her chronic medical conditions.       History of Present Illness (HPI):  Yola presents for a follow up visit.  The patient’s EMR has been reviewed.  Lives in [place], [place].  Occupation: Disability   Believes she is on disability 2/2 her extensive past medical history.  Previously followed up at [facility] for urologic care.    History of of BL ureteral obstruction 2/2 extrinsic compression from fibroids in the setting of CKD4.     S/p bilateral ureteral stent placement with Dr. Stanley Shah. (9/29/2022)  Hospitalized for acute UTI  and E. Coli bacteremia. (10/07 - 10/21/22)  Underwent hysterectomy + L stent removal + R stent exchanged. (10/19/23)    There was concern for persistent obstruction on the right 2/2 mid ureteral stricture; thus R stent was exchanged. RPG showed narrowing below pelvic brim.     Had R ureteral stent for extended period of time. (Oct 2022-Jan 2023)  Was scheduled to have removed in Dec 2022; this was postponed 2/2 UTI.   Eventually her R stent was removed by Dr. Astorga. (Jan 2023)   RPG at that time showed mild hydronephrosis;  With no filling defects or ureteral obstruction.   No urologic follow up over the past year.     Continues follow up with Nephrology for her CKD.  Recently treated for acute UTI in Feb 2024.   Urine culture grew E. Coli. (02/15/24)  Last positive culture: +E.Coli (12/01/23)    TODAY: (04/29/24)  Presents for follow up and CT results.   CT results reviewed with patient, see below.   She reports she has been doing well overall.   No acute or worsening complaints today.   Denies any recent infection, gross hematuria, fevers or chills.     CT A&P (04/16/24) personally reviewed.   No evidence of hydronephrosis bilaterally.   Noted renal calcifications vs punctate stones bilaterally.   As well as some bladder wall thickening.     TO REVIEW: Initial visit (04/01/24)  Reports she has been doing well overall.   Albeit, has had more recurring UTI's over recent months.   Last treated in Feb 2024, s/p antibiotics.   No UTI-related symptoms today.   Denies any flank pain, or gross hematuria.     Continues follow up with Nephrology for her CKD.     Renal function:  GFR 21 ; Cr 2.66. ~ (02/15/24)  GFR 14 ; Cr 3.74 ~ (01/10/24)  GFR 17 ; Cr 3.18 ~ (01/04/24)  GFR 16 ; Cr 3.30 ~ (12/01/23)    PMH: HTN, HLD, T2DM with proliferative retinopathy, CAD with NSTEMI (11/2021), progressive CKD, CVA (2019, on DAPT).  PSH: Hysterectomy  FH: Renal cancer (Brother).   SH: Non-smoker    Past Medical History:   Diagnosis Date     Arteriosclerosis of coronary artery 08/24/2023    Chronic kidney disease, stage 3b (Multi) 03/21/2022    Stage 3b chronic kidney disease    Coronary artery disease involving native coronary artery of native heart without angina pectoris 11/30/2021    Formatting of this note might be different from the original. Impression: 1. There is severe diffuse disease in the small ramus branch. 2. Mild diffuse disease in the LAD and RCA. Recommended Treatment: Medical Therapy Last Assessment & Plan: Formatting of this note might be different from the original. Assessment: NSTEMI 11/2021 Impression: 1. There is severe diffuse disease in the small ramus br    Hydronephrosis 09/11/2022    Last Assessment & Plan: Formatting of this note might be different from the original. Scheduled for surgery on 9/29/2022    Obstruction of ureter 02/23/2024    Personal history of other endocrine, nutritional and metabolic disease 03/10/2022    History of vitamin D deficiency    Pyuria 09/11/2022    Sepsis due to Escherichia coli (Multi) 10/11/2022    Last Assessment & Plan: Formatting of this note might be different from the original. +sepsis d/t E.Coli Pt states that she was hospitalized in 10/9/2022 for UTI, she was given IV antibiotics and most recent UC was negative. She denies any LUTS. Surgeon has ordered another UC, will obtain at pacc appt.    Ureteral stricture, right 12/07/2022    Last Assessment & Plan: Formatting of this note might be different from the original. Scheduled for surgery on 12/22/2022    Urinary tract infection 08/24/2023    Uterine leiomyoma 08/08/2022     Past Surgical History:   Procedure Laterality Date    IR CVC PICC  10/20/2022    IR CVC PICC    OTHER SURGICAL HISTORY  03/30/2022    No history of surgery     Family History   Problem Relation Name Age of Onset    Heart attack Mother       Social History     Tobacco Use   Smoking Status Never    Passive exposure: Never   Smokeless Tobacco Never     Current  Outpatient Medications   Medication Sig Dispense Refill    amLODIPine (Norvasc) 10 mg tablet Take 1 tablet (10 mg) by mouth once daily. 90 tablet 3    aspirin 81 mg chewable tablet Chew 1 tablet (81 mg) once daily.      atorvastatin (Lipitor) 80 mg tablet Take 1 tablet (80 mg) by mouth once daily at bedtime. 90 tablet 3    bisacodyl (Dulcolax, bisacodyl,) 5 mg EC tablet Take 1 tablet (5 mg) by mouth once daily as needed for constipation. 90 tablet 3    buPROPion SR (Wellbutrin SR) 200 mg 12 hr tablet Take 1 tablet (200 mg) by mouth 2 times a day.      carvedilol (Coreg) 12.5 mg tablet Take 1 tablet (12.5 mg) by mouth 2 times a day with meals. 180 tablet 3    clopidogrel (Plavix) 75 mg tablet Take 1 tablet (75 mg) by mouth once daily. 90 tablet 3    docusate sodium (Colace) 100 mg capsule Take 1 capsule (100 mg) by mouth 2 times a day as needed.      ferrous sulfate, 325 mg ferrous sulfate, tablet Take 1 tablet by mouth once daily. 90 tablet 3    folic acid (Folvite) 1 mg tablet Take 1 tablet (1 mg) by mouth once daily. 90 tablet 3    FUROSEMIDE ORAL Take by mouth once daily.      OneTouch Ultra Test strip test once a day 100 strip 3    pantoprazole (ProtoNix) 20 mg EC tablet Take 1 tablet (20 mg) by mouth once daily.      Trulicity 0.75 mg/0.5 mL pen injector Inject 0.75 mg subcutaneously one time a week. INJECT 1 PEN WEEKLY 6 mL 1     No current facility-administered medications for this visit.     Allergies   Allergen Reactions    Sulfamethoxazole-Trimethoprim Angioedema     Past medical, surgical, family and social history in the chart was reviewed and is accurate including any additions to what is in this HPI.    REVIEW OF SYSTEMS (ROS):   Constitutional: denies any unintentional weight loss or change in strength.  Integumentary: denies any rashes or pruritus.  Eyes: denies any double vision or eye pain.  Ear/Nose/Mouth/Throat: denies any nosebleeds or gum bleeds.  Cardiovascular: denies any chest pain or  syncope.  Respiratory: denies hemoptysis.  Gastrointestinal: denies nausea or vomiting.  Musculoskeletal: denies muscle cramping or weakness.  Neurologic: denies convulsions or seizures.  Hematologic/Lymphatic: denies bleeding tendencies.  Endocrine: denies heat/cold intolerance.  All other systems have been reviewed and are negative unless otherwise noted in the HPI.     OBJECTIVE:  There were no vitals taken for this visit.    PHYSICAL EXAM:  Constitutional: No obvious distress.  Eyes: Non-injected conjunctiva, sclera clear, EOMI.  Ears/Nose/Mouth/Throat: No obvious drainage per ears or nose.  Cardiovascular: Extremities are warm and well perfused. No edema, cyanosis or pallor.  Respiratory: No audible wheezing/stridor; respirations do not appear labored.  Gastrointestinal: Abdomen soft, not distended.  Musculoskeletal: Normal ROM of extremities.  Skin: No obvious rashes or open sores.  Neurologic: Alert and oriented, CN 2-12 grossly intact.  Psychiatric: Answers questions appropriately with normal affect.  Hematologic/Lymphatic/Immunologic: No obvious bruises or sites of spontaneous bleeding.  Genitourinary: No CVA tenderness, bladder not palpable.     LABS & IMAGING:  Lab Results   Component Value Date    WBC 7.3 02/15/2024    HGB 10.0 (L) 02/15/2024    HCT 29.3 (L) 02/15/2024     02/15/2024    CHOL 221 (H) 03/09/2022    TRIG 141 03/09/2022    HDL 66.1 03/09/2022    ALT 15 12/01/2023    AST 22 12/01/2023     02/15/2024    K 5.1 02/15/2024     02/15/2024    CREATININE 2.66 (H) 02/15/2024    BUN 36 (H) 02/15/2024    CO2 24 02/15/2024    TSH 0.55 03/09/2022    HGBA1C 5.1 12/01/2023     Scribed for Dr. Augusto Bishop by Reyes Verde.  I, Dr. Augusto Bishop have personally reviewed and agreed with the information entered by the Virtual Scribe. 04/29/24.

## 2024-05-01 ENCOUNTER — TELEPHONE (OUTPATIENT)
Dept: PRIMARY CARE | Facility: CLINIC | Age: 54
End: 2024-05-01
Payer: MEDICARE

## 2024-05-02 ENCOUNTER — LAB (OUTPATIENT)
Dept: LAB | Facility: LAB | Age: 54
End: 2024-05-02
Payer: MEDICARE

## 2024-05-02 DIAGNOSIS — N18.4 ANEMIA IN STAGE 4 CHRONIC KIDNEY DISEASE (MULTI): ICD-10-CM

## 2024-05-02 DIAGNOSIS — N18.4 TYPE 2 DIABETES MELLITUS WITH STAGE 4 CHRONIC KIDNEY DISEASE, WITHOUT LONG-TERM CURRENT USE OF INSULIN (MULTI): ICD-10-CM

## 2024-05-02 DIAGNOSIS — I12.9 HYPERTENSIVE KIDNEY DISEASE WITH STAGE 4 CHRONIC KIDNEY DISEASE (MULTI): ICD-10-CM

## 2024-05-02 DIAGNOSIS — N18.4 HYPERTENSIVE KIDNEY DISEASE WITH STAGE 4 CHRONIC KIDNEY DISEASE (MULTI): ICD-10-CM

## 2024-05-02 DIAGNOSIS — E11.22 TYPE 2 DIABETES MELLITUS WITH STAGE 4 CHRONIC KIDNEY DISEASE, WITHOUT LONG-TERM CURRENT USE OF INSULIN (MULTI): ICD-10-CM

## 2024-05-02 DIAGNOSIS — Z86.73 H/O: CVA (CEREBROVASCULAR ACCIDENT): ICD-10-CM

## 2024-05-02 DIAGNOSIS — D63.1 ANEMIA IN STAGE 4 CHRONIC KIDNEY DISEASE (MULTI): ICD-10-CM

## 2024-05-02 LAB
ALBUMIN SERPL BCP-MCNC: 3.6 G/DL (ref 3.4–5)
ANION GAP SERPL CALC-SCNC: 15 MMOL/L (ref 10–20)
BUN SERPL-MCNC: 58 MG/DL (ref 6–23)
CALCIUM SERPL-MCNC: 8.9 MG/DL (ref 8.6–10.6)
CHLORIDE SERPL-SCNC: 102 MMOL/L (ref 98–107)
CHOLEST SERPL-MCNC: 199 MG/DL (ref 0–199)
CHOLESTEROL/HDL RATIO: 2.7
CO2 SERPL-SCNC: 27 MMOL/L (ref 21–32)
CREAT SERPL-MCNC: 3.59 MG/DL (ref 0.5–1.05)
EGFRCR SERPLBLD CKD-EPI 2021: 14 ML/MIN/1.73M*2
ERYTHROCYTE [DISTWIDTH] IN BLOOD BY AUTOMATED COUNT: 11.3 % (ref 11.5–14.5)
EST. AVERAGE GLUCOSE BLD GHB EST-MCNC: 103 MG/DL
FERRITIN SERPL-MCNC: 294 NG/ML (ref 8–150)
GLUCOSE SERPL-MCNC: 127 MG/DL (ref 74–99)
HBA1C MFR BLD: 5.2 %
HCT VFR BLD AUTO: 28.7 % (ref 36–46)
HDLC SERPL-MCNC: 73.3 MG/DL
HGB BLD-MCNC: 9.2 G/DL (ref 12–16)
IRON SATN MFR SERPL: 32 % (ref 25–45)
IRON SERPL-MCNC: 109 UG/DL (ref 35–150)
LDLC SERPL CALC-MCNC: 110 MG/DL
MCH RBC QN AUTO: 28.2 PG (ref 26–34)
MCHC RBC AUTO-ENTMCNC: 32.1 G/DL (ref 32–36)
MCV RBC AUTO: 88 FL (ref 80–100)
NON HDL CHOLESTEROL: 126 MG/DL (ref 0–149)
NRBC BLD-RTO: 0 /100 WBCS (ref 0–0)
PHOSPHATE SERPL-MCNC: 4.6 MG/DL (ref 2.5–4.9)
PLATELET # BLD AUTO: 243 X10*3/UL (ref 150–450)
POTASSIUM SERPL-SCNC: 4.5 MMOL/L (ref 3.5–5.3)
PTH-INTACT SERPL-MCNC: 238.4 PG/ML (ref 18.5–88)
RBC # BLD AUTO: 3.26 X10*6/UL (ref 4–5.2)
SODIUM SERPL-SCNC: 139 MMOL/L (ref 136–145)
TIBC SERPL-MCNC: 338 UG/DL (ref 240–445)
TRIGL SERPL-MCNC: 78 MG/DL (ref 0–149)
TSH SERPL-ACNC: 1.29 MIU/L (ref 0.44–3.98)
UIBC SERPL-MCNC: 229 UG/DL (ref 110–370)
VLDL: 16 MG/DL (ref 0–40)
WBC # BLD AUTO: 6.5 X10*3/UL (ref 4.4–11.3)

## 2024-05-02 PROCEDURE — 85027 COMPLETE CBC AUTOMATED: CPT

## 2024-05-02 PROCEDURE — 83540 ASSAY OF IRON: CPT

## 2024-05-02 PROCEDURE — 80061 LIPID PANEL: CPT

## 2024-05-02 PROCEDURE — 83550 IRON BINDING TEST: CPT

## 2024-05-02 PROCEDURE — 84443 ASSAY THYROID STIM HORMONE: CPT

## 2024-05-02 PROCEDURE — 36415 COLL VENOUS BLD VENIPUNCTURE: CPT

## 2024-05-02 PROCEDURE — 82728 ASSAY OF FERRITIN: CPT

## 2024-05-02 PROCEDURE — 83036 HEMOGLOBIN GLYCOSYLATED A1C: CPT

## 2024-05-02 PROCEDURE — 80069 RENAL FUNCTION PANEL: CPT

## 2024-05-02 PROCEDURE — 83970 ASSAY OF PARATHORMONE: CPT

## 2024-07-09 ENCOUNTER — TELEPHONE (OUTPATIENT)
Dept: PRIMARY CARE | Facility: CLINIC | Age: 54
End: 2024-07-09
Payer: MEDICARE

## 2024-07-09 DIAGNOSIS — N18.4 TYPE 2 DIABETES MELLITUS WITH STAGE 4 CHRONIC KIDNEY DISEASE, WITHOUT LONG-TERM CURRENT USE OF INSULIN (MULTI): ICD-10-CM

## 2024-07-09 DIAGNOSIS — E11.22 TYPE 2 DIABETES MELLITUS WITH STAGE 4 CHRONIC KIDNEY DISEASE, WITHOUT LONG-TERM CURRENT USE OF INSULIN (MULTI): ICD-10-CM

## 2024-07-09 DIAGNOSIS — K59.09 CHRONIC CONSTIPATION: ICD-10-CM

## 2024-07-09 RX ORDER — INSULIN PUMP/INFUS. SET/METER
KIT MISCELLANEOUS
COMMUNITY
End: 2024-07-09 | Stop reason: ENTERED-IN-ERROR

## 2024-07-10 RX ORDER — LANCETS
EACH MISCELLANEOUS
Qty: 100 EACH | Refills: 3 | Status: SHIPPED | OUTPATIENT
Start: 2024-07-10

## 2024-07-10 RX ORDER — DEXTROSE 4 G
TABLET,CHEWABLE ORAL
Qty: 1 EACH | Refills: 0 | Status: SHIPPED | OUTPATIENT
Start: 2024-07-10

## 2024-07-10 RX ORDER — BLOOD SUGAR DIAGNOSTIC
STRIP MISCELLANEOUS
Qty: 100 STRIP | Refills: 3 | Status: SHIPPED | OUTPATIENT
Start: 2024-07-10

## 2024-07-10 RX ORDER — DOCUSATE SODIUM 100 MG/1
100 CAPSULE, LIQUID FILLED ORAL 2 TIMES DAILY PRN
Qty: 60 CAPSULE | Refills: 3 | Status: SHIPPED | OUTPATIENT
Start: 2024-07-10

## 2024-07-25 ENCOUNTER — APPOINTMENT (OUTPATIENT)
Dept: NEPHROLOGY | Facility: CLINIC | Age: 54
End: 2024-07-25
Payer: MEDICARE

## 2024-08-16 ENCOUNTER — APPOINTMENT (OUTPATIENT)
Dept: OPHTHALMOLOGY | Facility: CLINIC | Age: 54
End: 2024-08-16
Payer: MEDICARE

## 2024-08-16 DIAGNOSIS — H33.22 LEFT RETINAL DETACHMENT: Primary | ICD-10-CM

## 2024-08-16 DIAGNOSIS — E11.36: ICD-10-CM

## 2024-08-16 ASSESSMENT — EXTERNAL EXAM - LEFT EYE: OS_EXAM: NORMAL

## 2024-08-16 ASSESSMENT — CONF VISUAL FIELD
OS_INFERIOR_TEMPORAL_RESTRICTION: 1
OS_SUPERIOR_NASAL_RESTRICTION: 1
OS_SUPERIOR_TEMPORAL_RESTRICTION: 1
OS_INFERIOR_NASAL_RESTRICTION: 1

## 2024-08-16 ASSESSMENT — CUP TO DISC RATIO: OS_RATIO: NO VIEW

## 2024-08-16 ASSESSMENT — TONOMETRY
OS_IOP_MMHG: 15
OD_IOP_MMHG: 15
IOP_METHOD: GOLDMANN APPLANATION

## 2024-08-16 ASSESSMENT — ENCOUNTER SYMPTOMS
ENDOCRINE NEGATIVE: 1
EYES NEGATIVE: 1

## 2024-08-16 ASSESSMENT — VISUAL ACUITY
OD_CC: 20/25
METHOD: SNELLEN - LINEAR
OS_CC: NLP

## 2024-08-16 ASSESSMENT — SLIT LAMP EXAM - LIDS
COMMENTS: GOOD POSITION
COMMENTS: GOOD POSITION

## 2024-08-16 ASSESSMENT — EXTERNAL EXAM - RIGHT EYE: OD_EXAM: NORMAL

## 2024-08-16 NOTE — PROGRESS NOTES
Assessment/Plan   Diagnoses and all orders for this visit:  Left retinal detachment  -     OCT, Retina - OU - Both Eyes  -     Color Fundus Photography - OU - Both Eyes  Diabetic cataract, associated with type 2 diabetes mellitus (Multi)    Impression        1 Retinal detachment ;left eye   No value in surgery at this point  2 NS catarct OD   3 PDR OD inactive created by:Darryl Burton      Discussion        Hi quality OCT  scans obtained  signal good    OCT OD - Normal Foveal Contour, No Edema, IS/OS Junction Normal  OCT OS    additional commnents: created by:Darryl Burton    B-scan Ultrasound Interpretation -- Friday, August 18, 2023  OD/OS created by:Darryl Burton  No masses OS eviednt 2023          Plan  she has PRP laser OD and appers stable  wtach for NVE OD  left eye is non surgical  6m

## 2024-09-05 ENCOUNTER — APPOINTMENT (OUTPATIENT)
Dept: NEPHROLOGY | Facility: CLINIC | Age: 54
End: 2024-09-05
Payer: MEDICARE

## 2024-09-12 ENCOUNTER — LAB (OUTPATIENT)
Dept: LAB | Facility: LAB | Age: 54
End: 2024-09-12
Payer: MEDICARE

## 2024-09-12 ENCOUNTER — APPOINTMENT (OUTPATIENT)
Dept: NEPHROLOGY | Facility: CLINIC | Age: 54
End: 2024-09-12
Payer: MEDICARE

## 2024-09-12 VITALS
SYSTOLIC BLOOD PRESSURE: 132 MMHG | BODY MASS INDEX: 32.07 KG/M2 | HEART RATE: 65 BPM | HEIGHT: 63 IN | WEIGHT: 181 LBS | DIASTOLIC BLOOD PRESSURE: 69 MMHG

## 2024-09-12 DIAGNOSIS — N18.5 CHRONIC KIDNEY DISEASE, STAGE 5 (MULTI): Primary | ICD-10-CM

## 2024-09-12 DIAGNOSIS — N18.4 ANEMIA IN STAGE 4 CHRONIC KIDNEY DISEASE (MULTI): ICD-10-CM

## 2024-09-12 DIAGNOSIS — D63.1 ANEMIA IN STAGE 4 CHRONIC KIDNEY DISEASE (MULTI): ICD-10-CM

## 2024-09-12 DIAGNOSIS — I10 HYPERTENSION, ESSENTIAL: ICD-10-CM

## 2024-09-12 DIAGNOSIS — N18.5 CHRONIC KIDNEY DISEASE, STAGE 5 (MULTI): ICD-10-CM

## 2024-09-12 LAB
25(OH)D3 SERPL-MCNC: 23 NG/ML (ref 30–100)
ALBUMIN SERPL BCP-MCNC: 3.4 G/DL (ref 3.4–5)
ANION GAP SERPL CALC-SCNC: 15 MMOL/L (ref 10–20)
BUN SERPL-MCNC: 46 MG/DL (ref 6–23)
CALCIUM SERPL-MCNC: 8.7 MG/DL (ref 8.6–10.6)
CHLORIDE SERPL-SCNC: 107 MMOL/L (ref 98–107)
CO2 SERPL-SCNC: 21 MMOL/L (ref 21–32)
CREAT SERPL-MCNC: 4.65 MG/DL (ref 0.5–1.05)
CREAT UR-MCNC: 114.5 MG/DL (ref 20–320)
CREAT UR-MCNC: 114.5 MG/DL (ref 20–320)
EGFRCR SERPLBLD CKD-EPI 2021: 10 ML/MIN/1.73M*2
ERYTHROCYTE [DISTWIDTH] IN BLOOD BY AUTOMATED COUNT: 11.4 % (ref 11.5–14.5)
FERRITIN SERPL-MCNC: 356 NG/ML (ref 8–150)
GLUCOSE SERPL-MCNC: 164 MG/DL (ref 74–99)
HCT VFR BLD AUTO: 29.9 % (ref 36–46)
HGB BLD-MCNC: 9.7 G/DL (ref 12–16)
IRON SATN MFR SERPL: 37 % (ref 25–45)
IRON SERPL-MCNC: 111 UG/DL (ref 35–150)
MCH RBC QN AUTO: 28.8 PG (ref 26–34)
MCHC RBC AUTO-ENTMCNC: 32.4 G/DL (ref 32–36)
MCV RBC AUTO: 89 FL (ref 80–100)
MICROALBUMIN UR-MCNC: >2250 MG/L
MICROALBUMIN/CREAT UR: NORMAL MG/G{CREAT}
NRBC BLD-RTO: 0 /100 WBCS (ref 0–0)
PHOSPHATE SERPL-MCNC: 5.6 MG/DL (ref 2.5–4.9)
PLATELET # BLD AUTO: 253 X10*3/UL (ref 150–450)
POTASSIUM SERPL-SCNC: 5.3 MMOL/L (ref 3.5–5.3)
PROT UR-ACNC: 568 MG/DL (ref 5–24)
PROT/CREAT UR: 4.96 MG/MG CREAT (ref 0–0.17)
PTH-INTACT SERPL-MCNC: 408.6 PG/ML (ref 18.5–88)
RBC # BLD AUTO: 3.37 X10*6/UL (ref 4–5.2)
SODIUM SERPL-SCNC: 138 MMOL/L (ref 136–145)
TIBC SERPL-MCNC: 299 UG/DL (ref 240–445)
UIBC SERPL-MCNC: 188 UG/DL (ref 110–370)
WBC # BLD AUTO: 6.4 X10*3/UL (ref 4.4–11.3)

## 2024-09-12 PROCEDURE — 82728 ASSAY OF FERRITIN: CPT

## 2024-09-12 PROCEDURE — 3044F HG A1C LEVEL LT 7.0%: CPT | Performed by: NURSE PRACTITIONER

## 2024-09-12 PROCEDURE — 3062F POS MACROALBUMINURIA REV: CPT | Performed by: NURSE PRACTITIONER

## 2024-09-12 PROCEDURE — 83540 ASSAY OF IRON: CPT

## 2024-09-12 PROCEDURE — 3078F DIAST BP <80 MM HG: CPT | Performed by: NURSE PRACTITIONER

## 2024-09-12 PROCEDURE — 3008F BODY MASS INDEX DOCD: CPT | Performed by: NURSE PRACTITIONER

## 2024-09-12 PROCEDURE — 82043 UR ALBUMIN QUANTITATIVE: CPT

## 2024-09-12 PROCEDURE — 82570 ASSAY OF URINE CREATININE: CPT

## 2024-09-12 PROCEDURE — 99214 OFFICE O/P EST MOD 30 MIN: CPT | Performed by: NURSE PRACTITIONER

## 2024-09-12 PROCEDURE — 36415 COLL VENOUS BLD VENIPUNCTURE: CPT

## 2024-09-12 PROCEDURE — 83550 IRON BINDING TEST: CPT

## 2024-09-12 PROCEDURE — 83970 ASSAY OF PARATHORMONE: CPT

## 2024-09-12 PROCEDURE — 82306 VITAMIN D 25 HYDROXY: CPT

## 2024-09-12 PROCEDURE — 84156 ASSAY OF PROTEIN URINE: CPT

## 2024-09-12 PROCEDURE — 80069 RENAL FUNCTION PANEL: CPT

## 2024-09-12 PROCEDURE — 85027 COMPLETE CBC AUTOMATED: CPT

## 2024-09-12 PROCEDURE — 3049F LDL-C 100-129 MG/DL: CPT | Performed by: NURSE PRACTITIONER

## 2024-09-12 PROCEDURE — 3075F SYST BP GE 130 - 139MM HG: CPT | Performed by: NURSE PRACTITIONER

## 2024-09-12 RX ORDER — ACETAMINOPHEN 500 MG
500 TABLET ORAL EVERY 6 HOURS PRN
COMMUNITY
Start: 2023-08-16

## 2024-09-12 SDOH — ECONOMIC STABILITY: FOOD INSECURITY: WITHIN THE PAST 12 MONTHS, YOU WORRIED THAT YOUR FOOD WOULD RUN OUT BEFORE YOU GOT MONEY TO BUY MORE.: NEVER TRUE

## 2024-09-12 SDOH — ECONOMIC STABILITY: FOOD INSECURITY: WITHIN THE PAST 12 MONTHS, THE FOOD YOU BOUGHT JUST DIDN'T LAST AND YOU DIDN'T HAVE MONEY TO GET MORE.: NEVER TRUE

## 2024-09-12 ASSESSMENT — ENCOUNTER SYMPTOMS
SHORTNESS OF BREATH: 0
DYSURIA: 0
APPETITE CHANGE: 0
VOMITING: 0
ACTIVITY CHANGE: 0
NAUSEA: 0
DIZZINESS: 0
ROS SKIN COMMENTS: NO ITCHING
LIGHT-HEADEDNESS: 0

## 2024-09-12 ASSESSMENT — PAIN SCALES - GENERAL: PAINLEVEL: 0-NO PAIN

## 2024-09-12 NOTE — PROGRESS NOTES
Subjective   Patient ID: Yola Barillas is a 54 y.o. female who presents for Hypertension (Follow up visit for hypertension, CKD.).    Pt presents for CKD stage 4/5 d/t DM/HTN follow up.  Last seen in April with Cr 3.59 on 5/2, (2.66 on 2/15, 3.74 on 1/10, 3.3 on 12/1  No hospital admissions or ER visits   No new symptoms or c/o   Since last visit saw urology 4/29 to discuss CT results done 4/16 (negative for signs of obstruction or hydronephrosis.   Today primarily concerned about trulicity that she cannot afford it and has difficulty accessing it.     Review of Systems   Constitutional:  Negative for activity change and appetite change.        Has daily fatigue, not new      Respiratory:  Negative for shortness of breath.    Cardiovascular:  Negative for chest pain and leg swelling.        No nocturnal breathing issues   Gastrointestinal:  Negative for nausea and vomiting.   Genitourinary:  Negative for dysuria.   Skin:  Negative for rash.        No itching   Neurological:  Negative for dizziness and light-headedness.       Objective   Physical Exam  Constitutional:       General: She is not in acute distress.  HENT:      Mouth/Throat:      Mouth: Mucous membranes are moist.   Cardiovascular:      Rate and Rhythm: Normal rate and regular rhythm.      Heart sounds: Normal heart sounds.   Pulmonary:      Effort: Pulmonary effort is normal.      Breath sounds: Normal breath sounds.   Abdominal:      Palpations: Abdomen is soft.      Comments: Nontender    Musculoskeletal:      Right lower leg: No edema.      Left lower leg: No edema.   Skin:     General: Skin is warm and dry.   Neurological:      Mental Status: She is alert and oriented to person, place, and time. Mental status is at baseline.      Comments: Some word finding issues following stroke      Psychiatric:         Mood and Affect: Mood normal.         Behavior: Behavior normal.     /69 (BP Location: Right arm, Patient Position: Sitting, BP Cuff  "Size: Large adult)   Pulse 65   Ht 1.6 m (5' 3\")   Wt 82.1 kg (181 lb)   BMI 32.06 kg/m²   Lab Results   Component Value Date     05/02/2024    K 4.5 05/02/2024     05/02/2024    BUN 58 (H) 05/02/2024    CREATININE 3.59 (H) 05/02/2024    EGFR 14 (L) 05/02/2024      Assessment/Plan   Diagnoses and all orders for this visit:  Chronic kidney disease, stage 5 (Multi)  In setting of  poorly controlled DM in past, pooorly controlled HTN, proteinuria.  Cr fluctuates likely d/t hemodynamics  No obvious uremic symptoms though fatigue continues   Had discussion to review ESRD modality options as pt had chosen PD: reviewed housing (one bedroom apt, need for storage). Will opt for incenter HD when indicated and consider PD if situation changes.   Has not had trulicity for few weeks  Referral to specialty pharmacy for assistance   RTC in 2mths   Hypertension, essential        BP ok  today, pt reluctant for any new med changes         Appears euvolemic on exam         Reinforced low sodium diet, avoiding processed foods and carry out though this is difficult to sustain.   Referral to [facility]   Anemia in stage 4 chronic kidney disease (Multi)        Hgb 9.2 with irons stores and ferritin adequate.        Will monitor for now. Consider referral to anemia clinic for ESAs       BARNT Stout 09/12/24 11:01 AM   "

## 2024-09-12 NOTE — PATIENT INSTRUCTIONS
It was nice to see you Ms Doctor  You look great  Your blood pressure is ok after taking your meds today  Try to follow low sodium diet   Avoid processed foods, red meat and carry out.   You have chronic kidney disease stage 5. Avoid over the counter pain medications like Ibuprofen, naproxen, meloxicam, diclofenac, sulindac etc (NSAIDs).Tylenol is OK to use.Avoid IV contrast dye.Low sodium diet. BP goal less than 130/80 mm Hg.Tell all your health care providers you have chronic kidney disease stage 5   Please return in 2mths

## 2024-09-16 NOTE — PROGRESS NOTES
Subjective   Patient ID: Yola Barillas is a 54 y.o. female who presents for ckd/med rec    Referring Provider: Melissa Luz MD     HPI  HPI: CKD stage 5 . last EGFR 10 sCr 4.65 UACR: high K+ 5.3  HTN:  /69  Medications  Amlodipine 10mg every day  Carvedilol 12.5mg bid    DM:  A1C 5.2  Medications  Trulicity 0.75mg q week    HLD:    On statin? Atorvastatin 80mg every day?    Medications reviewed for appropriate dosing in setting of CKD  Recommended changes:  - no adjustments needed today      Objective     There were no vitals taken for this visit.     Labs  Lab Results   Component Value Date    BILITOT 0.5 12/01/2023    CALCIUM 8.7 09/12/2024    CO2 21 09/12/2024     09/12/2024    CREATININE 4.65 (H) 09/12/2024    GLUCOSE 164 (H) 09/12/2024    ALKPHOS 78 12/01/2023    K 5.3 09/12/2024    PROT 6.9 12/01/2023     09/12/2024    AST 22 12/01/2023    ALT 15 12/01/2023    BUN 46 (H) 09/12/2024    ANIONGAP 15 09/12/2024    PHOS 5.6 (H) 09/12/2024    ALBUMIN 3.4 09/12/2024    GFRF 30 (A) 03/30/2022     Lab Results   Component Value Date    TRIG 78 05/02/2024    CHOL 199 05/02/2024    LDLCALC 110 (H) 05/02/2024    HDL 73.3 05/02/2024     Lab Results   Component Value Date    HGBA1C 5.2 05/02/2024       Current Outpatient Medications on File Prior to Visit   Medication Sig Dispense Refill    acetaminophen (Tylenol) 500 mg tablet Take 1 tablet (500 mg) by mouth every 6 hours if needed.      amLODIPine (Norvasc) 10 mg tablet Take 1 tablet (10 mg) by mouth once daily. 90 tablet 3    aspirin 81 mg chewable tablet Chew 1 tablet (81 mg) once daily.      atorvastatin (Lipitor) 80 mg tablet Take 1 tablet (80 mg) by mouth once daily at bedtime. (Patient not taking: Reported on 9/12/2024) 90 tablet 3    bisacodyl (Dulcolax, bisacodyl,) 5 mg EC tablet Take 1 tablet (5 mg) by mouth once daily as needed for constipation. 90 tablet 3    blood sugar diagnostic (Accu-Chek Guide test strips) strip Use to  test blood sugar once a day 100 strip 3    blood-glucose meter (Accu-Chek Guide Glucose Meter) [facility] Use to test blood sugar once a day 1 each 0    buPROPion SR (Wellbutrin SR) 200 mg 12 hr tablet Take 1 tablet (200 mg) by mouth 2 times a day.      carvedilol (Coreg) 12.5 mg tablet Take 1 tablet (12.5 mg) by mouth 2 times a day with meals. 180 tablet 3    clopidogrel (Plavix) 75 mg tablet Take 1 tablet (75 mg) by mouth once daily. 90 tablet 3    docusate sodium (Colace) 100 mg capsule Take 1 capsule (100 mg) by mouth 2 times a day as needed for constipation. 60 capsule 3    ferrous sulfate, 325 mg ferrous sulfate, tablet Take 1 tablet by mouth once daily. 90 tablet 3    folic acid (Folvite) 1 mg tablet Take 1 tablet (1 mg) by mouth once daily. 90 tablet 3    lancets (Accu-Chek Fastclix Lancet Drum) misc Use to test blood sugar once a day 100 each 3    OneTouch Ultra Test strip test once a day (Patient not taking: Reported on 9/12/2024) 100 strip 3    pantoprazole (ProtoNix) 20 mg EC tablet Take 1 tablet (20 mg) by mouth once daily.      Trulicity 0.75 mg/0.5 mL pen injector Inject 0.75 mg subcutaneously one time a week. INJECT 1 PEN WEEKLY (Patient not taking: Reported on 9/12/2024) 6 mL 1    Trulicity 0.75 mg/0.5 mL pen injector Inject 0.75 mg subcutaneously one time a week. INJECT 1 PEN WEEKLY (Patient not taking: Reported on 9/12/2024) 6 mL 1    [DISCONTINUED] FUROSEMIDE ORAL Take by mouth once daily.       No current facility-administered medications on file prior to visit.        Assessment/Plan   PATIENT EDUCATION/DISCUSSION:  - Counseled patient on MOA, expectations, side effects, duration of therapy, contraindications, administration, and monitoring parameters  - Answered all patient questions and concerns  - med rec completed. Medications are up to date in epic.   - veltassa preferred $47. Began discussion will work on assistance application and start if approved.  - trulicity at 90 day will send into [facility]. $227   - med cost a barrier for patient we will work on getting enrolled in  patient assistance if able we will look at also starting veltassa/lisinopril for hyperkalemia/renal benefit.  _______________________________________________________________________  PLAN  1. CONTINUE Current meds   2.  Email me forms to apply for  patient assistance.  2. Prescription sent to [facility] pharmacy for assistance on authorization and copay. Medication will be mailed to patient.    Manuelito Rosales, PharmD    Continue all meds under the continuation of care with the referring provider and clinical pharmacy team.

## 2024-09-18 ENCOUNTER — APPOINTMENT (OUTPATIENT)
Dept: PHARMACY | Facility: HOSPITAL | Age: 54
End: 2024-09-18
Payer: MEDICARE

## 2024-09-18 DIAGNOSIS — N18.5 CHRONIC KIDNEY DISEASE, STAGE 5 (MULTI): ICD-10-CM

## 2024-09-24 DIAGNOSIS — I10 HYPERTENSION, ESSENTIAL: ICD-10-CM

## 2024-09-24 DIAGNOSIS — D63.1 ANEMIA IN STAGE 4 CHRONIC KIDNEY DISEASE (MULTI): ICD-10-CM

## 2024-09-24 DIAGNOSIS — Z86.73 H/O: CVA (CEREBROVASCULAR ACCIDENT): ICD-10-CM

## 2024-09-24 DIAGNOSIS — K59.09 CHRONIC CONSTIPATION: ICD-10-CM

## 2024-09-24 DIAGNOSIS — N18.4 ANEMIA IN STAGE 4 CHRONIC KIDNEY DISEASE (MULTI): ICD-10-CM

## 2024-09-25 RX ORDER — FOLIC ACID 1 MG/1
1 TABLET ORAL DAILY
Qty: 90 TABLET | Refills: 0 | Status: SHIPPED | OUTPATIENT
Start: 2024-09-25

## 2024-09-25 RX ORDER — FERROUS SULFATE 325(65) MG
325 TABLET ORAL
Qty: 90 TABLET | Refills: 0 | Status: SHIPPED | OUTPATIENT
Start: 2024-09-25

## 2024-09-25 RX ORDER — BISACODYL 5 MG
5 TABLET, DELAYED RELEASE (ENTERIC COATED) ORAL DAILY PRN
Qty: 90 TABLET | Refills: 0 | Status: SHIPPED | OUTPATIENT
Start: 2024-09-25

## 2024-09-25 RX ORDER — CARVEDILOL 12.5 MG/1
12.5 TABLET ORAL 2 TIMES DAILY
Qty: 180 TABLET | Refills: 0 | Status: SHIPPED | OUTPATIENT
Start: 2024-09-25

## 2024-09-25 RX ORDER — DOCUSATE SODIUM 100 MG/1
100 CAPSULE, LIQUID FILLED ORAL 2 TIMES DAILY PRN
Qty: 180 CAPSULE | Refills: 0 | Status: SHIPPED | OUTPATIENT
Start: 2024-09-25

## 2024-09-25 RX ORDER — CLOPIDOGREL BISULFATE 75 MG/1
75 TABLET ORAL DAILY
Qty: 90 TABLET | Refills: 0 | Status: SHIPPED | OUTPATIENT
Start: 2024-09-25

## 2024-09-25 RX ORDER — AMLODIPINE BESYLATE 10 MG/1
10 TABLET ORAL DAILY
Qty: 90 TABLET | Refills: 0 | Status: SHIPPED | OUTPATIENT
Start: 2024-09-25

## 2024-10-16 ENCOUNTER — APPOINTMENT (OUTPATIENT)
Dept: PHARMACY | Facility: HOSPITAL | Age: 54
End: 2024-10-16
Payer: MEDICARE

## 2024-10-25 ENCOUNTER — APPOINTMENT (OUTPATIENT)
Dept: PRIMARY CARE | Facility: CLINIC | Age: 54
End: 2024-10-25
Payer: MEDICARE

## 2024-10-25 ENCOUNTER — LAB (OUTPATIENT)
Dept: LAB | Facility: LAB | Age: 54
End: 2024-10-25
Payer: MEDICARE

## 2024-10-25 VITALS
DIASTOLIC BLOOD PRESSURE: 80 MMHG | OXYGEN SATURATION: 98 % | SYSTOLIC BLOOD PRESSURE: 150 MMHG | HEART RATE: 72 BPM | WEIGHT: 189.2 LBS | HEIGHT: 63 IN | BODY MASS INDEX: 33.52 KG/M2

## 2024-10-25 DIAGNOSIS — Z12.11 SCREENING FOR COLON CANCER: ICD-10-CM

## 2024-10-25 DIAGNOSIS — F33.1 MODERATE EPISODE OF RECURRENT MAJOR DEPRESSIVE DISORDER: ICD-10-CM

## 2024-10-25 DIAGNOSIS — Z00.00 MEDICARE ANNUAL WELLNESS VISIT, SUBSEQUENT: Primary | ICD-10-CM

## 2024-10-25 DIAGNOSIS — Z12.31 VISIT FOR SCREENING MAMMOGRAM: ICD-10-CM

## 2024-10-25 DIAGNOSIS — E11.3593 TYPE 2 DIABETES MELLITUS WITH BOTH EYES AFFECTED BY PROLIFERATIVE RETINOPATHY WITHOUT MACULAR EDEMA, WITHOUT LONG-TERM CURRENT USE OF INSULIN: ICD-10-CM

## 2024-10-25 LAB
CHOLEST SERPL-MCNC: 347 MG/DL (ref 0–199)
CHOLESTEROL/HDL RATIO: 4
EST. AVERAGE GLUCOSE BLD GHB EST-MCNC: 94 MG/DL
HBA1C MFR BLD: 4.9 %
HDLC SERPL-MCNC: 87.6 MG/DL
LDLC SERPL CALC-MCNC: 235 MG/DL
NON HDL CHOLESTEROL: 259 MG/DL (ref 0–149)
TRIGL SERPL-MCNC: 121 MG/DL (ref 0–149)
VLDL: 24 MG/DL (ref 0–40)

## 2024-10-25 PROCEDURE — 3049F LDL-C 100-129 MG/DL: CPT | Performed by: STUDENT IN AN ORGANIZED HEALTH CARE EDUCATION/TRAINING PROGRAM

## 2024-10-25 PROCEDURE — 3044F HG A1C LEVEL LT 7.0%: CPT | Performed by: STUDENT IN AN ORGANIZED HEALTH CARE EDUCATION/TRAINING PROGRAM

## 2024-10-25 PROCEDURE — 3062F POS MACROALBUMINURIA REV: CPT | Performed by: STUDENT IN AN ORGANIZED HEALTH CARE EDUCATION/TRAINING PROGRAM

## 2024-10-25 PROCEDURE — 99214 OFFICE O/P EST MOD 30 MIN: CPT | Performed by: STUDENT IN AN ORGANIZED HEALTH CARE EDUCATION/TRAINING PROGRAM

## 2024-10-25 PROCEDURE — G0439 PPPS, SUBSEQ VISIT: HCPCS | Performed by: STUDENT IN AN ORGANIZED HEALTH CARE EDUCATION/TRAINING PROGRAM

## 2024-10-25 PROCEDURE — 3077F SYST BP >= 140 MM HG: CPT | Performed by: STUDENT IN AN ORGANIZED HEALTH CARE EDUCATION/TRAINING PROGRAM

## 2024-10-25 PROCEDURE — 83036 HEMOGLOBIN GLYCOSYLATED A1C: CPT

## 2024-10-25 PROCEDURE — 3079F DIAST BP 80-89 MM HG: CPT | Performed by: STUDENT IN AN ORGANIZED HEALTH CARE EDUCATION/TRAINING PROGRAM

## 2024-10-25 PROCEDURE — 80061 LIPID PANEL: CPT

## 2024-10-25 PROCEDURE — 3008F BODY MASS INDEX DOCD: CPT | Performed by: STUDENT IN AN ORGANIZED HEALTH CARE EDUCATION/TRAINING PROGRAM

## 2024-10-25 PROCEDURE — 36415 COLL VENOUS BLD VENIPUNCTURE: CPT

## 2024-10-25 PROCEDURE — 1036F TOBACCO NON-USER: CPT | Performed by: STUDENT IN AN ORGANIZED HEALTH CARE EDUCATION/TRAINING PROGRAM

## 2024-10-25 RX ORDER — BUPROPION HYDROCHLORIDE 200 MG/1
200 TABLET, EXTENDED RELEASE ORAL DAILY
Qty: 90 TABLET | Refills: 3 | Status: SHIPPED | OUTPATIENT
Start: 2024-10-25

## 2024-10-25 ASSESSMENT — ACTIVITIES OF DAILY LIVING (ADL)
DOING_HOUSEWORK: INDEPENDENT
MANAGING_FINANCES: INDEPENDENT
TAKING_MEDICATION: INDEPENDENT
DRESSING: INDEPENDENT
BATHING: INDEPENDENT
GROCERY_SHOPPING: INDEPENDENT

## 2024-10-25 ASSESSMENT — PATIENT HEALTH QUESTIONNAIRE - PHQ9
SUM OF ALL RESPONSES TO PHQ9 QUESTIONS 1 AND 2: 0
1. LITTLE INTEREST OR PLEASURE IN DOING THINGS: NOT AT ALL
2. FEELING DOWN, DEPRESSED OR HOPELESS: NOT AT ALL

## 2024-10-25 NOTE — PROGRESS NOTES
Subjective   Reason for Visit: Yola Barillas is an 54 y.o. female here for a Medicare Wellness visit.     Past Medical, Surgical, and Family History reviewed and updated in chart.    Reviewed all medications by prescribing practitioner or clinical pharmacist (such as prescriptions, OTCs, herbal therapies and supplements) and documented in the medical record.    HPI  53y/o female with HTN, CVA, NSTEMI , CAD , CKD stage 4 , major depression     Not taking Trulicity due to non coverage  Has not been taking Wellbutrin since she has not psych in a long time  Has not been taking statin . Notices increased fatigue in the mornings after she takes the statin the night before            Patient Care Team:  Melissa Luz MD as PCP - General (Family Medicine)  Melissa Luz MD as PCP - [employer] PCP     Current Outpatient Medications   Medication Instructions    acetaminophen (TYLENOL) 500 mg, Every 6 hours PRN    amLODIPine (NORVASC) 10 mg, oral, Daily    aspirin 81 mg chewable tablet 1 tablet, Daily    atorvastatin (LIPITOR) 80 mg, oral, Nightly    bisacodyl (DULCOLAX) 5 mg, oral, Daily PRN    blood sugar diagnostic (Accu-Chek Guide test strips) strip Use to test blood sugar once a day    blood-glucose meter (Accu-Chek Guide Glucose Meter) misc Use to test blood sugar once a day    buPROPion SR (WELLBUTRIN SR) 200 mg, oral, Daily    carvedilol (COREG) 12.5 mg, oral, 2 times daily    clopidogrel (PLAVIX) 75 mg, oral, Daily    docusate sodium (COLACE) 100 mg, oral, 2 times daily PRN    ferrous sulfate (325 mg ferrous sulfate) (FEROSUL) 325 mg, oral, Daily with breakfast    folic acid (FOLVITE) 1 mg, oral, Daily    lancets (Accu-Chek Fastclix Lancet Drum) misc Use to test blood sugar once a day    pantoprazole (ProtoNix) 20 mg EC tablet 1 tablet, Daily    Trulicity 0.75 mg/0.5 mL pen injector Inject 0.75 mg subcutaneously one time a week. INJECT 1 PEN WEEKLY    Trulicity 0.75 mg/0.5 mL pen  "injector Inject 0.75 mg subcutaneously one time a week. INJECT 1 PEN WEEKLY        Social History     Tobacco Use    Smoking status: Never     Passive exposure: Never    Smokeless tobacco: Never   Substance Use Topics    Alcohol use: Never        Review of Systems  Constitutional: no chills, no fever and no night sweats.     Eyes: no blurred vision and no eyesight problems.     ENT: no hearing loss, no nasal congestion, no nasal discharge, no hoarseness and no sore throat.     Cardiovascular: no chest pain, no intermittent leg claudication, no lower extremity edema, no palpitations and no syncope.     Respiratory: no cough, no shortness of breath during exertion, no shortness of breath at rest and no wheezing.     Gastrointestinal: no abdominal pain, no blood in stools, no constipation, no diarrhea, no melena, no nausea, no rectal pain and no vomiting.     Genitourinary: no dysuria, no change in urinary frequency, no urinary hesitancy, no feelings of urinary urgency and no vaginal discharge.     Musculoskeletal: no arthralgias, no back pain and no myalgias.     Integumentary: no new skin lesions and no rashes.     Neurological: no difficulty walking, no headache, no limb weakness, no numbness and no tingling.     Psychiatric: no anxiety, no depression, no anhedonia and no substance use disorders.     Endocrine: no recent weight gain and no recent weight loss.     Hematologic/Lymphatic: no tendency for easy bruising and no swollen glands.          All other systems have been reviewed and are negative for complaint.    Objective   Vitals:  /80   Pulse 72   Ht 1.6 m (5' 3\")   Wt 85.8 kg (189 lb 3.2 oz)   SpO2 98%   BMI 33.52 kg/m²       Physical Exam    Constitutional: Alert and in no acute distress. Well developed, well nourished.     Eyes: Normal external exam. Pupils were equal in size, round, reactive to light (PERRL) with normal accommodation and extraocular movements intact (EOMI).     Ears, Nose, " Mouth, and Throat: External inspection of ears and nose: Normal.  Otoscopic examination: Normal.      Neck: No neck mass was observed. Supple.     Cardiovascular: Heart rate and rhythm were normal, normal S1 and S2, no gallops, no murmurs and no pericardial rub    Pulmonary: No respiratory distress. Clear bilateral breath sounds.     Abdomen: Soft nontender; no abdominal mass palpated. No organomegaly.     Musculoskeletal: No joint swelling seen, normal movements of all extremities. Range of motion: Normal.  Muscle strength/tone: Normal.          Neurologic: Deep tendon reflexes were 2+ and symmetric. Sensation: Normal.     Psychiatric: Judgment and insight: Intact. Mood and affect: Normal.      Assessment/Plan   Problem List Items Addressed This Visit       Episode of recurrent major depressive disorder (CMS-HCC)    Overview        Type 2 diabetes mellitus with both eyes affected by proliferative retinopathy without macular edema, without long-term current use of insulin - Primary     Other Visit Diagnoses       Medicare annual wellness visit, subsequent              55y/o female with HTN, CVA, NSTEMI , CAD , CKD stage 4     BP elevated today , WNL at previous visit   Need for statin given risk factors discussed   Take 40mg on alternate days to begin with   Labs as above   Rx for depression resumed   RTO in 4-6m        Immunizations :  Influenza :  declined   Shingles: recommended to receive at the pharmacy  ? Pt reported receiving the vaccine  Cancer screenings:   Mammogram :   Screening ordered  Cervical cancer:  Screening current  in 2022 per chart reviewed   To fu with gyn   Colon cancer:     Screening  ordered   Lung cancer :     Screening not indicated  HIV screening:   Screening  not indicated  Osteoporosis :   N/A     RTO in 4m     This note is intended for the physician writing it, as well as to communicate findings to other healthcare professionals. These notes use medical lexicon that may be  misunderstood by non medical persons. Therefore, interpretations of medical notes and terminology should be approached with caution.

## 2024-11-05 DIAGNOSIS — N18.5 CHRONIC KIDNEY DISEASE, STAGE 5 (MULTI): Primary | ICD-10-CM

## 2024-11-14 ENCOUNTER — APPOINTMENT (OUTPATIENT)
Dept: NEPHROLOGY | Facility: CLINIC | Age: 54
End: 2024-11-14
Payer: MEDICARE

## 2024-11-15 ENCOUNTER — APPOINTMENT (OUTPATIENT)
Dept: OPHTHALMOLOGY | Facility: CLINIC | Age: 54
End: 2024-11-15
Payer: MEDICARE

## 2024-12-03 DIAGNOSIS — I25.2 H/O NON-ST ELEVATION MYOCARDIAL INFARCTION (NSTEMI): ICD-10-CM

## 2024-12-03 DIAGNOSIS — E78.2 MIXED HYPERLIPIDEMIA: ICD-10-CM

## 2024-12-03 DIAGNOSIS — I25.10 CORONARY ARTERY DISEASE INVOLVING NATIVE CORONARY ARTERY OF NATIVE HEART WITHOUT ANGINA PECTORIS: Primary | ICD-10-CM

## 2024-12-08 DIAGNOSIS — D63.1 ANEMIA IN STAGE 4 CHRONIC KIDNEY DISEASE (MULTI): ICD-10-CM

## 2024-12-08 DIAGNOSIS — N18.4 ANEMIA IN STAGE 4 CHRONIC KIDNEY DISEASE (MULTI): ICD-10-CM

## 2024-12-08 DIAGNOSIS — Z86.73 H/O: CVA (CEREBROVASCULAR ACCIDENT): ICD-10-CM

## 2024-12-09 RX ORDER — FOLIC ACID 1 MG/1
1 TABLET ORAL DAILY
Qty: 90 TABLET | Refills: 1 | Status: SHIPPED | OUTPATIENT
Start: 2024-12-09

## 2024-12-09 RX ORDER — CARVEDILOL 12.5 MG/1
12.5 TABLET ORAL 2 TIMES DAILY
Qty: 180 TABLET | Refills: 1 | Status: SHIPPED | OUTPATIENT
Start: 2024-12-09

## 2024-12-09 RX ORDER — CLOPIDOGREL BISULFATE 75 MG/1
75 TABLET ORAL DAILY
Qty: 90 TABLET | Refills: 3 | Status: SHIPPED | OUTPATIENT
Start: 2024-12-09

## 2024-12-09 RX ORDER — FERROUS SULFATE 325(65) MG
TABLET ORAL
Qty: 90 TABLET | Refills: 1 | Status: SHIPPED | OUTPATIENT
Start: 2024-12-09

## 2024-12-12 ENCOUNTER — APPOINTMENT (OUTPATIENT)
Dept: NEPHROLOGY | Facility: CLINIC | Age: 54
End: 2024-12-12
Payer: MEDICARE

## 2024-12-19 PROBLEM — E78.9 LIPID DISORDER: Status: RESOLVED | Noted: 2021-11-30 | Resolved: 2024-12-19

## 2024-12-19 PROBLEM — Z86.73 HISTORY OF STROKE: Status: RESOLVED | Noted: 2023-08-24 | Resolved: 2024-12-19

## 2024-12-19 PROBLEM — H25.9 AGE-RELATED CATARACT OF LEFT EYE: Status: RESOLVED | Noted: 2022-05-24 | Resolved: 2024-12-19

## 2024-12-19 PROBLEM — E11.9 TYPE 2 DIABETES MELLITUS: Status: RESOLVED | Noted: 2021-11-17 | Resolved: 2024-12-19

## 2025-01-08 ENCOUNTER — OFFICE VISIT (OUTPATIENT)
Dept: CARDIOLOGY | Facility: CLINIC | Age: 55
End: 2025-01-08
Payer: MEDICARE

## 2025-01-08 VITALS
OXYGEN SATURATION: 99 % | HEIGHT: 64 IN | DIASTOLIC BLOOD PRESSURE: 82 MMHG | WEIGHT: 176 LBS | HEART RATE: 87 BPM | BODY MASS INDEX: 30.05 KG/M2 | SYSTOLIC BLOOD PRESSURE: 142 MMHG

## 2025-01-08 DIAGNOSIS — E11.3593 TYPE 2 DIABETES MELLITUS WITH BOTH EYES AFFECTED BY PROLIFERATIVE RETINOPATHY WITHOUT MACULAR EDEMA, WITHOUT LONG-TERM CURRENT USE OF INSULIN: ICD-10-CM

## 2025-01-08 DIAGNOSIS — E66.811 CLASS 1 OBESITY WITH SERIOUS COMORBIDITY AND BODY MASS INDEX (BMI) OF 33.0 TO 33.9 IN ADULT, UNSPECIFIED OBESITY TYPE: ICD-10-CM

## 2025-01-08 DIAGNOSIS — I25.10 CORONARY ARTERY DISEASE INVOLVING NATIVE CORONARY ARTERY OF NATIVE HEART WITHOUT ANGINA PECTORIS: Primary | ICD-10-CM

## 2025-01-08 DIAGNOSIS — E78.2 MIXED HYPERLIPIDEMIA: ICD-10-CM

## 2025-01-08 DIAGNOSIS — K21.9 GASTROESOPHAGEAL REFLUX DISEASE WITHOUT ESOPHAGITIS: ICD-10-CM

## 2025-01-08 DIAGNOSIS — E11.36: ICD-10-CM

## 2025-01-08 DIAGNOSIS — N18.5 CHRONIC KIDNEY DISEASE, STAGE 5 (MULTI): ICD-10-CM

## 2025-01-08 DIAGNOSIS — I25.2 H/O NON-ST ELEVATION MYOCARDIAL INFARCTION (NSTEMI): ICD-10-CM

## 2025-01-08 PROCEDURE — 1036F TOBACCO NON-USER: CPT | Performed by: INTERNAL MEDICINE

## 2025-01-08 PROCEDURE — 99214 OFFICE O/P EST MOD 30 MIN: CPT | Performed by: INTERNAL MEDICINE

## 2025-01-08 PROCEDURE — 3008F BODY MASS INDEX DOCD: CPT | Performed by: INTERNAL MEDICINE

## 2025-01-08 PROCEDURE — 99204 OFFICE O/P NEW MOD 45 MIN: CPT | Performed by: INTERNAL MEDICINE

## 2025-01-08 PROCEDURE — 93005 ELECTROCARDIOGRAM TRACING: CPT | Performed by: INTERNAL MEDICINE

## 2025-01-08 PROCEDURE — 93010 ELECTROCARDIOGRAM REPORT: CPT | Performed by: INTERNAL MEDICINE

## 2025-01-08 PROCEDURE — 3077F SYST BP >= 140 MM HG: CPT | Performed by: INTERNAL MEDICINE

## 2025-01-08 PROCEDURE — 3079F DIAST BP 80-89 MM HG: CPT | Performed by: INTERNAL MEDICINE

## 2025-01-08 NOTE — PROGRESS NOTES
"  Liliya Aceves Doctor  is a 54 y.o. year old female who presents for ASHD evluation S/P MI 2021.  No chest pain, no dyspnea, no palpitations, no edema. Now but had edema a months.     Blood pressure 142/82, pulse 87, height 1.626 m (5' 4\"), weight 79.8 kg (176 lb), SpO2 99%.   Sulfamethoxazole-trimethoprim  Past Medical History:   Diagnosis Date    Arteriosclerosis of coronary artery 08/24/2023    Cataract     Chronic kidney disease, stage 3b (Multi) 03/21/2022    Stage 3b chronic kidney disease    Coronary artery disease involving native coronary artery of native heart without angina pectoris 11/30/2021    Formatting of this note might be different from the original. Impression: 1. There is severe diffuse disease in the small ramus branch. 2. Mild diffuse disease in the LAD and RCA. Recommended Treatment: Medical Therapy Last Assessment & Plan: Formatting of this note might be different from the original. Assessment: NSTEMI 11/2021 Impression: 1. There is severe diffuse disease in the small ramus br    Diabetic retinopathy (Multi)     Hydronephrosis 09/11/2022    Last Assessment & Plan: Formatting of this note might be different from the original. Scheduled for surgery on 9/29/2022    Obstruction of ureter 02/23/2024    Personal history of other endocrine, nutritional and metabolic disease 03/10/2022    History of vitamin D deficiency    Pyuria 09/11/2022    Retinal detachment     Sepsis due to Escherichia coli (Multi) 10/11/2022    Last Assessment & Plan: Formatting of this note might be different from the original. +sepsis d/t E.Coli Pt states that she was hospitalized in 10/9/2022 for UTI, she was given IV antibiotics and most recent UC was negative. She denies any LUTS. Surgeon has ordered another UC, will obtain at pacc appt.    Ureteral stricture, right 12/07/2022    Last Assessment & Plan: Formatting of this note might be different from the original. Scheduled for surgery on 12/22/2022    Urinary " tract infection 08/24/2023    Uterine leiomyoma 08/08/2022     Past Surgical History:   Procedure Laterality Date    IR CVC PICC  10/20/2022    IR CVC PICC    OTHER SURGICAL HISTORY  03/30/2022    No history of surgery    RETINAL LASER PROCEDURE Left     PRP     Family History   Problem Relation Name Age of Onset    Heart attack Mother       @SOC    Current Outpatient Medications   Medication Sig Dispense Refill    acetaminophen (Tylenol) 500 mg tablet Take 1 tablet (500 mg) by mouth every 6 hours if needed.      amLODIPine (Norvasc) 10 mg tablet Take 1 tablet (10 mg) by mouth once daily. 90 tablet 0    aspirin 81 mg chewable tablet Chew 1 tablet (81 mg) once daily.      atorvastatin (Lipitor) 80 mg tablet Take 1 tablet (80 mg) by mouth once daily at bedtime. (Patient not taking: Reported on 9/12/2024) 90 tablet 3    bisacodyl (Dulcolax) 5 mg EC tablet Take 1 tablet (5 mg) by mouth once daily as needed for constipation. 90 tablet 0    blood sugar diagnostic (Accu-Chek Guide test strips) strip Use to test blood sugar once a day 100 strip 3    blood-glucose meter (Accu-Chek Guide Glucose Meter) [facility] Use to test blood sugar once a day 1 each 0    buPROPion SR (Wellbutrin SR) 200 mg 12 hr tablet Take 1 tablet (200 mg) by mouth once daily. 90 tablet 3    carvedilol (Coreg) 12.5 mg tablet Take 1 tablet (12.5 mg) by mouth 2 times a day. 180 tablet 1    clopidogrel (Plavix) 75 mg tablet Take 1 tablet (75 mg) by mouth once daily. 90 tablet 3    docusate sodium (Colace) 100 mg capsule Take 1 capsule (100 mg) by mouth 2 times a day as needed for constipation. 180 capsule 0    ferrous sulfate, 325 mg ferrous sulfate, (FeroSuL) tablet TAKE 1 TABLET (325 MG) BY MOUTH ONCE DAILY WITH BREAKFAST. 90 tablet 1    folic acid (Folvite) 1 mg tablet Take 1 tablet (1 mg) by mouth once daily. 90 tablet 1    lancets (Accu-Chek Fastclix Lancet Drum) misc Use to test blood sugar once a day 100 each 3    pantoprazole (ProtoNix) 20 mg EC tablet  Take 1 tablet (20 mg) by mouth once daily.      Trulicity 0.75 mg/0.5 mL pen injector Inject 0.75 mg subcutaneously one time a week. INJECT 1 PEN WEEKLY (Patient not taking: Reported on 1/8/2025) 6 mL 1    Trulicity 0.75 mg/0.5 mL pen injector Inject 0.75 mg subcutaneously one time a week. INJECT 1 PEN WEEKLY (Patient not taking: Reported on 1/8/2025) 6 mL 1     No current facility-administered medications for this visit.        ROS  Review of Systems   All other systems reviewed and are negative.      Physical Exam  Physical Exam  Constitutional:       Appearance: She is obese.   Neurological:      Mental Status: She is alert.          EKG  No results found for this or any previous visit (from the past 4464 hours).    Problem List Items Addressed This Visit       CAD (coronary artery disease) - Primary    Relevant Orders    ECG 12 Lead    Transthoracic Echo (TTE) Complete    Follow Up In Cardiology    Referral to [facility]    H/O non-ST elevation myocardial infarction (NSTEMI)     11/2021 cardiac catheterization 11/30/21 severe diffuse disease  of small ramus branch         Relevant Orders    Transthoracic Echo (TTE) Complete    HLD (hyperlipidemia)     10/25/24 Tchol =  347, HDL = 87.6, LDL = 235, VLDL = 24         Relevant Orders    Follow Up In Cardiology    Diabetic cataract, associated with type 2 diabetes mellitus (Multi)    GERD (gastroesophageal reflux disease)    Class 1 obesity with body mass index (BMI) of 33.0 to 33.9 in adult    Chronic kidney disease, stage 5 (Multi)     9/12/24 BUN = 46, Creat = 5.65, follow with nephrology         Type 2 diabetes mellitus with both eyes affected by proliferative retinopathy without macular edema, without long-term current use of insulin     Refuses to go to endocrinologist         Relevant Orders    Referral to [facility]         Refer to [facility] group  Echocardiogram  Return 3 months with EKG      Marcos Ma MD

## 2025-01-09 LAB
ATRIAL RATE: 80 BPM
P AXIS: 49 DEGREES
P OFFSET: 200 MS
P ONSET: 149 MS
PR INTERVAL: 134 MS
Q ONSET: 216 MS
QRS COUNT: 13 BEATS
QRS DURATION: 78 MS
QT INTERVAL: 352 MS
QTC CALCULATION(BAZETT): 405 MS
QTC FREDERICIA: 387 MS
R AXIS: -41 DEGREES
T AXIS: 24 DEGREES
T OFFSET: 392 MS
VENTRICULAR RATE: 80 BPM

## 2025-01-22 ENCOUNTER — HOSPITAL ENCOUNTER (OUTPATIENT)
Dept: CARDIOLOGY | Facility: CLINIC | Age: 55
Discharge: HOME | End: 2025-01-22
Payer: MEDICARE

## 2025-01-22 DIAGNOSIS — I25.10 CORONARY ARTERY DISEASE INVOLVING NATIVE CORONARY ARTERY OF NATIVE HEART WITHOUT ANGINA PECTORIS: ICD-10-CM

## 2025-01-22 DIAGNOSIS — I25.2 H/O NON-ST ELEVATION MYOCARDIAL INFARCTION (NSTEMI): ICD-10-CM

## 2025-01-22 LAB
AORTIC VALVE MEAN GRADIENT: 10 MMHG
AORTIC VALVE PEAK VELOCITY: 2.26 M/S
AV PEAK GRADIENT: 20 MMHG
AVA (PEAK VEL): 2.1 CM2
AVA (VTI): 2.04 CM2
EJECTION FRACTION APICAL 4 CHAMBER: 59.7
EJECTION FRACTION: 68 %
LEFT ATRIUM VOLUME AREA LENGTH INDEX BSA: 48.3 ML/M2
LEFT VENTRICLE INTERNAL DIMENSION DIASTOLE: 4.47 CM (ref 3.5–6)
LEFT VENTRICULAR OUTFLOW TRACT DIAMETER: 2.04 CM
MITRAL VALVE E/A RATIO: 0.84
RIGHT VENTRICLE FREE WALL PEAK S': 0.16 CM/S
RIGHT VENTRICLE PEAK SYSTOLIC PRESSURE: 28.1 MMHG
TRICUSPID ANNULAR PLANE SYSTOLIC EXCURSION: 2.3 CM

## 2025-01-22 PROCEDURE — 93306 TTE W/DOPPLER COMPLETE: CPT

## 2025-01-22 PROCEDURE — 93306 TTE W/DOPPLER COMPLETE: CPT | Performed by: INTERNAL MEDICINE

## 2025-01-25 ENCOUNTER — HOSPITAL ENCOUNTER (EMERGENCY)
Facility: HOSPITAL | Age: 55
Discharge: HOME | End: 2025-01-25
Attending: GENERAL PRACTICE
Payer: MEDICARE

## 2025-01-25 VITALS
TEMPERATURE: 99.9 F | SYSTOLIC BLOOD PRESSURE: 198 MMHG | WEIGHT: 175 LBS | DIASTOLIC BLOOD PRESSURE: 91 MMHG | RESPIRATION RATE: 17 BRPM | HEIGHT: 67 IN | BODY MASS INDEX: 27.47 KG/M2 | HEART RATE: 94 BPM | OXYGEN SATURATION: 98 %

## 2025-01-25 DIAGNOSIS — J10.1 INFLUENZA A: Primary | ICD-10-CM

## 2025-01-25 LAB
FLUAV RNA RESP QL NAA+PROBE: DETECTED
FLUBV RNA RESP QL NAA+PROBE: NOT DETECTED
SARS-COV-2 RNA RESP QL NAA+PROBE: NOT DETECTED

## 2025-01-25 PROCEDURE — 99283 EMERGENCY DEPT VISIT LOW MDM: CPT | Performed by: GENERAL PRACTICE

## 2025-01-25 PROCEDURE — 87636 SARSCOV2 & INF A&B AMP PRB: CPT | Performed by: GENERAL PRACTICE

## 2025-01-25 PROCEDURE — 87636 SARSCOV2 & INF A&B AMP PRB: CPT | Performed by: PHYSICIAN ASSISTANT

## 2025-01-25 RX ORDER — BENZONATATE 100 MG/1
100 CAPSULE ORAL 3 TIMES DAILY PRN
Qty: 21 CAPSULE | Refills: 0 | Status: SHIPPED | OUTPATIENT
Start: 2025-01-25 | End: 2025-02-01

## 2025-01-25 ASSESSMENT — COLUMBIA-SUICIDE SEVERITY RATING SCALE - C-SSRS
1. IN THE PAST MONTH, HAVE YOU WISHED YOU WERE DEAD OR WISHED YOU COULD GO TO SLEEP AND NOT WAKE UP?: NO
2. HAVE YOU ACTUALLY HAD ANY THOUGHTS OF KILLING YOURSELF?: NO
6. HAVE YOU EVER DONE ANYTHING, STARTED TO DO ANYTHING, OR PREPARED TO DO ANYTHING TO END YOUR LIFE?: NO

## 2025-01-26 NOTE — ED TRIAGE NOTES
Pt states headache congestion and non productive cough x2 days. Denies N/V/D. No other complaints

## 2025-01-28 ENCOUNTER — APPOINTMENT (OUTPATIENT)
Dept: CARDIOLOGY | Facility: HOSPITAL | Age: 55
End: 2025-01-28
Payer: MEDICARE

## 2025-01-28 ENCOUNTER — HOSPITAL ENCOUNTER (EMERGENCY)
Facility: HOSPITAL | Age: 55
Discharge: HOME | End: 2025-01-28
Attending: STUDENT IN AN ORGANIZED HEALTH CARE EDUCATION/TRAINING PROGRAM
Payer: MEDICARE

## 2025-01-28 ENCOUNTER — APPOINTMENT (OUTPATIENT)
Dept: RADIOLOGY | Facility: HOSPITAL | Age: 55
End: 2025-01-28
Payer: MEDICARE

## 2025-01-28 VITALS
RESPIRATION RATE: 18 BRPM | WEIGHT: 175 LBS | HEIGHT: 64 IN | BODY MASS INDEX: 29.88 KG/M2 | DIASTOLIC BLOOD PRESSURE: 89 MMHG | HEART RATE: 99 BPM | SYSTOLIC BLOOD PRESSURE: 174 MMHG | OXYGEN SATURATION: 98 % | TEMPERATURE: 99.9 F

## 2025-01-28 DIAGNOSIS — J11.1 FLU: Primary | ICD-10-CM

## 2025-01-28 DIAGNOSIS — R52 BODY ACHES: ICD-10-CM

## 2025-01-28 LAB
ALBUMIN SERPL BCP-MCNC: 2.9 G/DL (ref 3.4–5)
ALP SERPL-CCNC: 70 U/L (ref 33–110)
ALT SERPL W P-5'-P-CCNC: 17 U/L (ref 7–45)
ANION GAP SERPL CALC-SCNC: 16 MMOL/L (ref 10–20)
AST SERPL W P-5'-P-CCNC: 30 U/L (ref 9–39)
BASOPHILS # BLD AUTO: 0.01 X10*3/UL (ref 0–0.1)
BASOPHILS NFR BLD AUTO: 0.1 %
BILIRUB SERPL-MCNC: 0.7 MG/DL (ref 0–1.2)
BUN SERPL-MCNC: 40 MG/DL (ref 6–23)
CALCIUM SERPL-MCNC: 7.8 MG/DL (ref 8.6–10.3)
CHLORIDE SERPL-SCNC: 107 MMOL/L (ref 98–107)
CO2 SERPL-SCNC: 17 MMOL/L (ref 21–32)
CREAT SERPL-MCNC: 5.62 MG/DL (ref 0.5–1.05)
EGFRCR SERPLBLD CKD-EPI 2021: 8 ML/MIN/1.73M*2
EOSINOPHIL # BLD AUTO: 0 X10*3/UL (ref 0–0.7)
EOSINOPHIL NFR BLD AUTO: 0 %
ERYTHROCYTE [DISTWIDTH] IN BLOOD BY AUTOMATED COUNT: 11.5 % (ref 11.5–14.5)
GLUCOSE SERPL-MCNC: 115 MG/DL (ref 74–99)
HCT VFR BLD AUTO: 30.8 % (ref 36–46)
HGB BLD-MCNC: 10.4 G/DL (ref 12–16)
IMM GRANULOCYTES # BLD AUTO: 0.04 X10*3/UL (ref 0–0.7)
IMM GRANULOCYTES NFR BLD AUTO: 0.4 % (ref 0–0.9)
LYMPHOCYTES # BLD AUTO: 0.21 X10*3/UL (ref 1.2–4.8)
LYMPHOCYTES NFR BLD AUTO: 1.9 %
MCH RBC QN AUTO: 29.4 PG (ref 26–34)
MCHC RBC AUTO-ENTMCNC: 33.8 G/DL (ref 32–36)
MCV RBC AUTO: 87 FL (ref 80–100)
MONOCYTES # BLD AUTO: 0.53 X10*3/UL (ref 0.1–1)
MONOCYTES NFR BLD AUTO: 4.8 %
NEUTROPHILS # BLD AUTO: 10.24 X10*3/UL (ref 1.2–7.7)
NEUTROPHILS NFR BLD AUTO: 92.8 %
NRBC BLD-RTO: 0 /100 WBCS (ref 0–0)
PLATELET # BLD AUTO: 172 X10*3/UL (ref 150–450)
POTASSIUM SERPL-SCNC: 5.3 MMOL/L (ref 3.5–5.3)
PROT SERPL-MCNC: 6.2 G/DL (ref 6.4–8.2)
RBC # BLD AUTO: 3.54 X10*6/UL (ref 4–5.2)
SODIUM SERPL-SCNC: 135 MMOL/L (ref 136–145)
WBC # BLD AUTO: 11 X10*3/UL (ref 4.4–11.3)

## 2025-01-28 PROCEDURE — 96361 HYDRATE IV INFUSION ADD-ON: CPT

## 2025-01-28 PROCEDURE — 85025 COMPLETE CBC W/AUTO DIFF WBC: CPT | Performed by: NURSE PRACTITIONER

## 2025-01-28 PROCEDURE — 80053 COMPREHEN METABOLIC PANEL: CPT | Performed by: NURSE PRACTITIONER

## 2025-01-28 PROCEDURE — 96374 THER/PROPH/DIAG INJ IV PUSH: CPT

## 2025-01-28 PROCEDURE — 36415 COLL VENOUS BLD VENIPUNCTURE: CPT | Performed by: NURSE PRACTITIONER

## 2025-01-28 PROCEDURE — 96375 TX/PRO/DX INJ NEW DRUG ADDON: CPT

## 2025-01-28 PROCEDURE — 93005 ELECTROCARDIOGRAM TRACING: CPT

## 2025-01-28 PROCEDURE — 71046 X-RAY EXAM CHEST 2 VIEWS: CPT | Mod: FOREIGN READ | Performed by: RADIOLOGY

## 2025-01-28 PROCEDURE — 71046 X-RAY EXAM CHEST 2 VIEWS: CPT

## 2025-01-28 PROCEDURE — 99285 EMERGENCY DEPT VISIT HI MDM: CPT | Mod: 25 | Performed by: STUDENT IN AN ORGANIZED HEALTH CARE EDUCATION/TRAINING PROGRAM

## 2025-01-28 PROCEDURE — 2500000004 HC RX 250 GENERAL PHARMACY W/ HCPCS (ALT 636 FOR OP/ED): Performed by: NURSE PRACTITIONER

## 2025-01-28 RX ORDER — LIDOCAINE 560 MG/1
1 PATCH PERCUTANEOUS; TOPICAL; TRANSDERMAL DAILY
Qty: 7 PATCH | Refills: 0 | Status: SHIPPED | OUTPATIENT
Start: 2025-01-28

## 2025-01-28 RX ORDER — ONDANSETRON HYDROCHLORIDE 2 MG/ML
4 INJECTION, SOLUTION INTRAVENOUS ONCE
Status: COMPLETED | OUTPATIENT
Start: 2025-01-28 | End: 2025-01-28

## 2025-01-28 RX ORDER — KETOROLAC TROMETHAMINE 30 MG/ML
15 INJECTION, SOLUTION INTRAMUSCULAR; INTRAVENOUS ONCE
Status: COMPLETED | OUTPATIENT
Start: 2025-01-28 | End: 2025-01-28

## 2025-01-28 RX ORDER — ONDANSETRON 4 MG/1
4 TABLET, FILM COATED ORAL EVERY 6 HOURS
Qty: 9 TABLET | Refills: 0 | Status: SHIPPED | OUTPATIENT
Start: 2025-01-28 | End: 2025-01-31

## 2025-01-28 RX ADMIN — ONDANSETRON 4 MG: 2 INJECTION INTRAMUSCULAR; INTRAVENOUS at 21:00

## 2025-01-28 RX ADMIN — SODIUM CHLORIDE 1000 ML: 9 INJECTION, SOLUTION INTRAVENOUS at 21:00

## 2025-01-28 RX ADMIN — KETOROLAC TROMETHAMINE 15 MG: 30 INJECTION, SOLUTION INTRAMUSCULAR at 21:00

## 2025-01-28 ASSESSMENT — COLUMBIA-SUICIDE SEVERITY RATING SCALE - C-SSRS
2. HAVE YOU ACTUALLY HAD ANY THOUGHTS OF KILLING YOURSELF?: NO
6. HAVE YOU EVER DONE ANYTHING, STARTED TO DO ANYTHING, OR PREPARED TO DO ANYTHING TO END YOUR LIFE?: NO
1. IN THE PAST MONTH, HAVE YOU WISHED YOU WERE DEAD OR WISHED YOU COULD GO TO SLEEP AND NOT WAKE UP?: NO

## 2025-01-28 NOTE — ED TRIAGE NOTES
This patient was seen in triage.     Vitals are noted.      HPI:  Patient is a 54-year-old female who came in on Saturday, diagnosed with influenza A, returns today because she is not getting any better, states that she is feeling worse, still with fevers, chills, body aches, decreased appetite also with nausea, vomiting.  Denies any diarrhea.      Focused PE:  Gen: Well-appearing, not in acute distress  Cardiovascular: Regular rate, normal rhythm, no murmur, no gallop  Respiratory: No adventitious lung sounds auscultated.  Abdomen: No reproducible abdominal tenderness upon palpation,  physical exam may be limited by patient positioning sitting up in a chair  Neuro:  Alert and Oriented, speech clear and coherent     Plan:   IV, lab work, fluids, imaging        For the remainder of the patient's workup and ED course, please see the main ED provider note.  We discussed need for diagnostic testing including lab studies and imaging.  We also discussed that they may be asked to wait in the waiting room while these test are pending.  They understand that if they choose to leave without having the testing completed or resulted that we cannot rule out acute life-threatening illnesses and the risks involved to lead to worsening condition, permanent disability or even death.

## 2025-01-28 NOTE — ED TRIAGE NOTES
Pt states that she was seen Saturday and had the flu. States that it has gotten worse. States that she is nauseous and not able to eat. Says that her breathing has gotten more labored and she is more weak. Pt 96% on RA and speaking full sentences.

## 2025-01-29 LAB
ATRIAL RATE: 101 BPM
P AXIS: 42 DEGREES
PR INTERVAL: 123 MS
Q ONSET: 249 MS
QRS COUNT: 16 BEATS
QRS DURATION: 80 MS
QT INTERVAL: 339 MS
QTC CALCULATION(BAZETT): 438 MS
QTC FREDERICIA: 402 MS
R AXIS: -46 DEGREES
T AXIS: 61 DEGREES
T OFFSET: 419 MS
VENTRICULAR RATE: 100 BPM

## 2025-01-29 NOTE — DISCHARGE INSTRUCTIONS
You are previously diagnosed with influenza I do recommend taking Tylenol as needed for mild to moderate aches and pains as well as fevers.  Lidocaine patches on areas of aches 12 hours on 12 hours off.  Utilize the Zofran as needed for breakthrough nausea.  Should you been experiencing signs of dehydration increased work of breathing symptoms concerning to call 911 or return to the nearest emergency department immediately otherwise follow-up with your primary provider in the coming days for repeat evaluation.

## 2025-01-29 NOTE — ED PROVIDER NOTES
EMERGENCY DEPARTMENT ENCOUNTER      Pt Name: Yola Barillas  MRN: [id number]  Birthdate 1970  Date of evaluation: 1/28/2025  Provider: Luis Antonio Becerra DO    CHIEF COMPLAINT       Chief Complaint   Patient presents with    Flu Symptoms     Pt states that she was seen Saturday and had the flu. States that it has gotten worse. States that she is nauseous and not able to eat. Says that her breathing has gotten more labored and she is more weak. Pt 96% on RA and speaking full sentences.         HISTORY OF PRESENT ILLNESS    Yola Barillas is a 54 y.o. female who presents to the emergency department with Family member for generalized bodyaches as well as decreased appetite and nonproductive cough.  She has had no measured fevers at home.  She does note that she was recently diagnosed with influenza.  She was seen in the department this past Saturday when she was diagnosed with influenza.  She denies any further associated symptoms at this time.  Denies any history of respiratory disease.  Reports that she cannot take NSAIDs long-term due to CKD.          Nursing Notes were reviewed.    REVIEW OF SYSTEMS     CONSTITUTIONAL: Denies fever, sweats, chills.   NEURO: Denies difficulty walking, numbness, weakness, tingling, headache.   HEENT: Denies sore throat, rhinorrhea, changes in vision.   CARDIO: Denies chest pain, palpitations.  PULM: Endorses cough.  Denies shortness of breath.   GI: Denies abdominal pain, nausea, vomiting, diarrhea, constipation, melena, hematochezia.  : Denies painful urination, frequency, hematuria.   MSK: Endorses generalized bodyaches.  SKIN: Denies rash, lesions.   ENDOCRINE: Denies unexpected weight-loss.   HEME: Denies bleeding disorder.     PAST MEDICAL HISTORY     Past Medical History:   Diagnosis Date    Arteriosclerosis of coronary artery 08/24/2023    Cataract     Chronic kidney disease, stage 3b (Multi) 03/21/2022    Stage 3b chronic kidney disease    Coronary artery disease  involving native coronary artery of native heart without angina pectoris 11/30/2021    Formatting of this note might be different from the original. Impression: 1. There is severe diffuse disease in the small ramus branch. 2. Mild diffuse disease in the LAD and RCA. Recommended Treatment: Medical Therapy Last Assessment & Plan: Formatting of this note might be different from the original. Assessment: NSTEMI 11/2021 Impression: 1. There is severe diffuse disease in the small ramus br    Diabetic retinopathy (Multi)     Hydronephrosis 09/11/2022    Last Assessment & Plan: Formatting of this note might be different from the original. Scheduled for surgery on 9/29/2022    Obstruction of ureter 02/23/2024    Personal history of other endocrine, nutritional and metabolic disease 03/10/2022    History of vitamin D deficiency    Pyuria 09/11/2022    Retinal detachment     Sepsis due to Escherichia coli (Multi) 10/11/2022    Last Assessment & Plan: Formatting of this note might be different from the original. +sepsis d/t E.Coli Pt states that she was hospitalized in 10/9/2022 for UTI, she was given IV antibiotics and most recent UC was negative. She denies any LUTS. Surgeon has ordered another UC, will obtain at pacc appt.    Ureteral stricture, right 12/07/2022    Last Assessment & Plan: Formatting of this note might be different from the original. Scheduled for surgery on 12/22/2022    Urinary tract infection 08/24/2023    Uterine leiomyoma 08/08/2022       SURGICAL HISTORY       Past Surgical History:   Procedure Laterality Date    IR CVC PICC  10/20/2022    IR CVC PICC    OTHER SURGICAL HISTORY  03/30/2022    No history of surgery    RETINAL LASER PROCEDURE Left     PRP       ALLERGIES     Sulfamethoxazole-trimethoprim    FAMILY HISTORY       Family History   Problem Relation Name Age of Onset    Heart attack Mother          SOCIAL HISTORY       Social History     Socioeconomic History    Marital status: Single   Tobacco  Use    Smoking status: Never     Passive exposure: Never    Smokeless tobacco: Never   Vaping Use    Vaping status: Never Used   Substance and Sexual Activity    Alcohol use: Never     Social Drivers of Health     Financial Resource Strain: Not on File (2022)    Received from [employer]    Financial Resource Strain     Financial Resource Strain: 0   Recent Concern: Financial Resource Strain - At Risk (2022)    Received from [employer]    Financial Resource Strain     Financial Resource Strain: 2   Food Insecurity: No Food Insecurity (2024)    Hunger Vital Sign     Worried About Running Out of Food in the Last Year: Never true     Ran Out of Food in the Last Year: Never true   Transportation Needs: Not on File (2022)    Received from [employer]    Transportation Needs     Transportation: 0   Physical Activity: Not on File (2022)    Received from [employer]    Physical Activity     Physical Activity: 0   Recent Concern: Physical Activity - At Risk (2022)    Received from [employer]    Physical Activity     Physical Activity: 2   Stress: Not on File (2022)    Received from [employer]    Stress     Stress: 0   Recent Concern: Stress - At Risk (2022)    Received from [employer]    Stress     Stress: 2   Social Connections: Not on File (2024)    Received from [employer]    Social Connections     Connectedness: 0   Housing Stability: Not on File (2022)    Received from [employer]    Housing Stability     Housin   Recent Concern: Housing Stability - At Risk (2022)    Received from [employer]    Housing Stability     Housin       PHYSICAL EXAM   VS: As documented in the triage note from today's date and EMR flowsheet were reviewed.  Gen: Well developed. No acute distress. Seated in bed. Appears nontoxic.  Alert and oriented to person time place.  Skin: Warm. Dry. Intact. No rashes or lesions.  Eyes: Pupils equally round and reactive to light. Clear sclera. EOMI.  HENT: Atraumatic appearance. Mucosal membranes moist. No  oral lesions, uvula midline, airway patent.  No meningismal signs.  CV: Tachycardic rate and regular rhythm. S1, S2. No pedal edema. Warm extremities.  Resp: Nonlabored breathing Clear to auscultation bilaterally. No increased work of breathing.   GI: Soft and nontender. No rebound or guarding. Bowel sounds x4 present.   MSK: Symmetric muscle bulk. No joint swelling in the extremities. Compartments are soft. Neurovascularly intact x4 extremities. Radial pulses +2 equal bilaterally.  Pedal pulses +2 equal bladder.  Neuro: Alert. CN II - XII intact. Speech fluent. Moving all extremities. No focal deficits. Gait normal.  Psych: Appropriate. K[place].    DIAGNOSTIC RESULTS   RADIOLOGY:   Non-plain film images such as CT, Ultrasound and MRI are read by the radiologist. Plain radiographic images are visualized and preliminarily interpreted by the emergency physician with the below findings: Chest x-ray without evidence of pneumonia.      Interpretation per the Radiologist below, if available at the time of this note:    XR chest 2 views   Final Result   No focal infiltrate identified.   Signed by Niko Marcos MD            ED BEDSIDE ULTRASOUND:   Performed by ED Physician - none    LABS:  Labs Reviewed   CBC WITH AUTO DIFFERENTIAL - Abnormal       Result Value    WBC 11.0      nRBC 0.0      RBC 3.54 (*)     Hemoglobin 10.4 (*)     Hematocrit 30.8 (*)     MCV 87      MCH 29.4      MCHC 33.8      RDW 11.5      Platelets 172      Neutrophils % 92.8      Immature Granulocytes %, Automated 0.4      Lymphocytes % 1.9      Monocytes % 4.8      Eosinophils % 0.0      Basophils % 0.1      Neutrophils Absolute 10.24 (*)     Immature Granulocytes Absolute, Automated 0.04      Lymphocytes Absolute 0.21 (*)     Monocytes Absolute 0.53      Eosinophils Absolute 0.00      Basophils Absolute 0.01     COMPREHENSIVE METABOLIC PANEL - Abnormal    Glucose 115 (*)     Sodium 135 (*)     Potassium 5.3      Chloride 107      Bicarbonate 17 (*)  "    Anion Gap 16      Urea Nitrogen 40 (*)     Creatinine 5.62 (*)     eGFR 8 (*)     Calcium 7.8 (*)     Albumin 2.9 (*)     Alkaline Phosphatase 70      Total Protein 6.2 (*)     AST 30      Bilirubin, Total 0.7      ALT 17         All other labs were within normal range or not returned as of this dictation.    EMERGENCY DEPARTMENT COURSE/MDM:   Vitals:    Vitals:    01/28/25 1826 01/28/25 2206 01/28/25 2324   BP: (!) 177/94 (!) 196/96 174/89   BP Location: Right arm Left arm    Patient Position: Sitting     Pulse: (!) 110 99    Resp: 18 18    Temp: 37.7 °C (99.9 °F)     TempSrc: Tympanic     SpO2: 96% 98%    Weight: 79.4 kg (175 lb)     Height: 1.626 m (5' 4\")         I reviewed the patient's triage vitals and they are hypertensive recommend follow-up with primary physician for repeat checks.  Patient is borderline febrile and tachycardic therefore fluid bolus was ordered as well as Toradol for fever.    Due to the above findings the following was ordered basic labs to include chest x-ray and EKG.    Tachycardia resolved with as well as fluids.  No leukocytosis making bacterial infectious etiology less likely no signs of pneumonia on chest x-ray.  There are no significant electrolyte derangements noted.  Patient does have JOYCELYN on CKD she was encouraged additional p.o. hydration.  EKG showed isolated sinus tachycardia no acute injury pattern or runs of dysrhythmias.  Her tachycardia did completely resolve blood pressure did downtrend.  Patient was ambulated did not desaturate whatsoever.  She is not a candidate for Tamiflu at this time.  She is recommended Tylenol as needed for body aches as well as lidocaine patches and decongestions.  She is to follow-up with her primary physician in the coming days she is appreciative of care and agreeable with this plan discharged in stable condition.    ED Course as of 01/29/25 0118 Tue Jan 28, 2025 2242 Interpreted by the Emergency Department Attending: ECG revealed sinus " tachycardia at a rate of 100 beats per minute with ND interval 123 , QRS of 80 , QTc of 438.  No acute injury pattern. Previous EKG on January revealed no significant changes.    [MG]      ED Course User Index  [MG] Luis Antonio Becerra DO         Diagnoses as of 01/29/25 0118   Flu   Body aches       Patient was counseled regarding labs, imaging, likely diagnosis, and plan. All questions were answered.     ------------------------------------------------------------------  Information provided by the patient and family member  Past medical history complicating workup CKD  Previous medical records reviewed office visit 1/8/2025  Considered hospital admission although patient overall well-appearing no increased work of breathing saturating appropriately.  Shared medical decision making patient agreeable with close outpatient follow-up.  ------------------------------------------------------------------  ED Medications administered this visit:    Medications   ondansetron (Zofran) injection 4 mg (4 mg intravenous Given 1/28/25 2100)   ketorolac (Toradol) injection 15 mg (15 mg intravenous Given 1/28/25 2100)   sodium chloride 0.9 % bolus 1,000 mL (0 mL intravenous Stopped 1/28/25 2245)       New Prescriptions from this visit:    Discharge Medication List as of 1/28/2025 10:51 PM        START taking these medications    Details   lidocaine 4 % patch Place 1 patch over 12 hours on the skin once daily. Remove & discard patch within 12 hours or as directed by MD., Starting Tue 1/28/2025, Print      ondansetron (Zofran) 4 mg tablet Take 1 tablet (4 mg) by mouth every 6 hours for 3 days., Starting Tue 1/28/2025, Until Fri 1/31/2025, Print             Follow-up:  Melissa Luz MD  [address] [place] [address] Dr [address] [place] [id number] [phone]    Schedule an appointment as soon as possible for a visit in 2 days      [facility]  [address] [facility] [address] [phone]  Go to   If  symptoms worsen        Final Impression:   1. Flu    2. Body aches          Luis Antonio Becerra DO    (Please note that portions of this note were completed with a voice recognition program.  Efforts were made to edit the dictations but occasionally words are mis-transcribed.)     Luis Antonio Becerra DO  01/29/25 0122

## 2025-01-30 NOTE — ED PROVIDER NOTES
HPI   Chief Complaint   Patient presents with    Flu Symptoms       HPI: 54-year-old female with a history of chronic kidney disease presents for myalgias and cough.  For the past 4 to 5 days she has had the symptoms.  She denies sick contacts and recent travel.  She denies shortness of breath, fever, chills, chest pain and rash.  No abdominal pain or dysuria      Limitations to history: None  Independent Historians: Patient  External Records Reviewed: HIE, outpatient notes, inpatient notes  ------------------------------------------------------------------------------------------------------------------------------------------  ROS: a ten point review of systems was performed and was negative except as per HPI.  ------------------------------------------------------------------------------------------------------------------------------------------  PMH / PSH: as per HPI, otherwise reviewed in EMR  MEDS: as per HPI, otherwise reviewed in EMR  ALLERGIES: as per HPI, otherwise reviewed in EMR  SocH:  as per HPI, otherwise reviewed in EMR  FH:  as per HPI, otherwise reviewed in EMR  ------------------------------------------------------------------------------------------------------------------------------------------  Physical Exam:  VS: As documented in the triage note and EMR flowsheet from this visit was reviewed  General: Well appearing. No acute distress.   Eyes:  Extraocular movements grossly intact. No scleral icterus. No discharge  HEENT:  Normocephalic.  Atraumatic  Neck: Moves neck freely. No gross masses  CV: Regular rhythm. No murmurs, rubs or gallops   Resp: Clear to auscultation bilaterally. No respiratory distress.    GI: Soft, no masses, nontender. No rebound tenderness or guarding  MSK: Symmetric muscle bulk. No deformities. No lower extremity edema.    Skin: Warm, dry, intact.   Neuro: No focal deficits.  A&O x3.   Psych: Appropriate for  situation  ------------------------------------------------------------------------------------------------------------------------------------------  Hospital Course / Medical Decision Making:  Independent Interpretations: N/A  EKG as interpreted by me: N/A    MDM: 54-year-old female with a history of chronic kidney disease presents for myalgias and cough.  She is hypertensive but is denying headache, chest pain and change in vision.  She has a mildly elevated temperature but is not febrile.  She is positive for influenza A.  She is not short of breath and not requiring supplemental oxygen.  I did not prescribe Tamiflu because the patient has a history of chronic kidney disease.  She will take over-the-counter medications and stay well-hydrated and will follow-up with her primary care physician as soon as possible and will return to the ER for any concerns     Discussion of Management with Other Providers:   I discussed the patient/results with: Emergency medicine team    Final diagnosis and disposition as below.    Results for orders placed or performed during the hospital encounter of 01/25/25  -Sars-CoV-2 and Influenza A/B PCR:   Collection Time: 01/25/25  9:57 PM       Result                      Value             Ref Range           Flu A Result                Detected (A)      Not Detected        Flu B Result                Not Detected      Not Detected        Coronavirus 2019, PCR       Not Detected      Not Detected   No orders to display                Patient History   Past Medical History:   Diagnosis Date    Arteriosclerosis of coronary artery 08/24/2023    Cataract     Chronic kidney disease, stage 3b (Multi) 03/21/2022    Stage 3b chronic kidney disease    Coronary artery disease involving native coronary artery of native heart without angina pectoris 11/30/2021    Formatting of this note might be different from the original. Impression: 1. There is severe diffuse disease in the small ramus branch.  2. Mild diffuse disease in the LAD and RCA. Recommended Treatment: Medical Therapy Last Assessment & Plan: Formatting of this note might be different from the original. Assessment: NSTEMI 11/2021 Impression: 1. There is severe diffuse disease in the small ramus br    Diabetic retinopathy (Multi)     Hydronephrosis 09/11/2022    Last Assessment & Plan: Formatting of this note might be different from the original. Scheduled for surgery on 9/29/2022    Obstruction of ureter 02/23/2024    Personal history of other endocrine, nutritional and metabolic disease 03/10/2022    History of vitamin D deficiency    Pyuria 09/11/2022    Retinal detachment     Sepsis due to Escherichia coli (Multi) 10/11/2022    Last Assessment & Plan: Formatting of this note might be different from the original. +sepsis d/t E.Coli Pt states that she was hospitalized in 10/9/2022 for UTI, she was given IV antibiotics and most recent UC was negative. She denies any LUTS. Surgeon has ordered another UC, will obtain at pacc appt.    Ureteral stricture, right 12/07/2022    Last Assessment & Plan: Formatting of this note might be different from the original. Scheduled for surgery on 12/22/2022    Urinary tract infection 08/24/2023    Uterine leiomyoma 08/08/2022     Past Surgical History:   Procedure Laterality Date    IR CVC PICC  10/20/2022    IR CVC PICC    OTHER SURGICAL HISTORY  03/30/2022    No history of surgery    RETINAL LASER PROCEDURE Left     PRP     Family History   Problem Relation Name Age of Onset    Heart attack Mother       Social History     Tobacco Use    Smoking status: Never     Passive exposure: Never    Smokeless tobacco: Never   Vaping Use    Vaping status: Never Used   Substance Use Topics    Alcohol use: Never    Drug use: Not on file       Physical Exam   ED Triage Vitals   Temperature Heart Rate Respirations BP   01/25/25 2150 01/25/25 2150 01/25/25 2150 01/25/25 2150   37.7 °C (99.9 °F) 97 18 (!) 205/92      Pulse Ox Temp  Source Heart Rate Source Patient Position   01/25/25 2150 01/25/25 2235 01/25/25 2235 01/25/25 2235   99 % Tympanic Monitor Lying      BP Location FiO2 (%)     01/25/25 2235 --     Right arm        Physical Exam      ED Course & MDM   Diagnoses as of 01/30/25 1659   Influenza A                 No data recorded     Arben Coma Scale Score: 15 (01/25/25 2237 : Venus Mary RN)                           Medical Decision Making      Procedure  Procedures     Maninder Hardy DO  01/30/25 171

## 2025-02-03 ENCOUNTER — TELEMEDICINE (OUTPATIENT)
Dept: PRIMARY CARE | Facility: CLINIC | Age: 55
End: 2025-02-03
Payer: MEDICARE

## 2025-02-03 DIAGNOSIS — J40 BRONCHITIS: Primary | ICD-10-CM

## 2025-02-03 PROCEDURE — 1036F TOBACCO NON-USER: CPT | Performed by: STUDENT IN AN ORGANIZED HEALTH CARE EDUCATION/TRAINING PROGRAM

## 2025-02-03 PROCEDURE — 99214 OFFICE O/P EST MOD 30 MIN: CPT | Performed by: STUDENT IN AN ORGANIZED HEALTH CARE EDUCATION/TRAINING PROGRAM

## 2025-02-03 RX ORDER — DOXYCYCLINE 100 MG/1
100 CAPSULE ORAL 2 TIMES DAILY
Qty: 20 CAPSULE | Refills: 0 | Status: SHIPPED | OUTPATIENT
Start: 2025-02-03 | End: 2025-02-03 | Stop reason: SDUPTHER

## 2025-02-03 RX ORDER — DOXYCYCLINE 100 MG/1
100 CAPSULE ORAL 2 TIMES DAILY
Qty: 20 CAPSULE | Refills: 0 | Status: SHIPPED | OUTPATIENT
Start: 2025-02-03 | End: 2025-02-13

## 2025-02-03 NOTE — PROGRESS NOTES
Subjective   Patient ID: Yola Barillas is a 54 y.o. female who presents for Follow-up (Dx with flu last week requesting medication. ).        HPI     Tested positive for Flu on 1/25  in the ER.  Sx onset on 1/24  Was not given Tamiflu   At this time she continues to have that is not productive and fatigue   No trouble breathing / fever             Visit Vitals  Smoking Status Never      No LMP recorded.   Current Outpatient Medications   Medication Instructions    acetaminophen (TYLENOL) 500 mg, Every 6 hours PRN    amLODIPine (NORVASC) 10 mg, oral, Daily    aspirin 81 mg chewable tablet 1 tablet, Daily    atorvastatin (LIPITOR) 80 mg, oral, Nightly    bisacodyl (DULCOLAX) 5 mg, oral, Daily PRN    blood sugar diagnostic (Accu-Chek Guide test strips) strip Use to test blood sugar once a day    blood-glucose meter (Accu-Chek Guide Glucose Meter) misc Use to test blood sugar once a day    buPROPion SR (WELLBUTRIN SR) 200 mg, oral, Daily    carvedilol (COREG) 12.5 mg, oral, 2 times daily    clopidogrel (PLAVIX) 75 mg, oral, Daily    docusate sodium (COLACE) 100 mg, oral, 2 times daily PRN    doxycycline (VIBRAMYCIN) 100 mg, oral, 2 times daily, Take with at least 8 ounces (large glass) of water, do not lie down for 30 minutes after    ferrous sulfate, 325 mg ferrous sulfate, (FeroSuL) tablet TAKE 1 TABLET (325 MG) BY MOUTH ONCE DAILY WITH BREAKFAST.    folic acid (FOLVITE) 1 mg, oral, Daily    lancets (Accu-Chek Fastclix Lancet Drum) misc Use to test blood sugar once a day    lidocaine 4 % patch 1 patch, transdermal, Daily, Remove & discard patch within 12 hours or as directed by MD.    pantoprazole (ProtoNix) 20 mg EC tablet 1 tablet, Daily    Trulicity 0.75 mg/0.5 mL pen injector Inject 0.75 mg subcutaneously one time a week. INJECT 1 PEN WEEKLY    Trulicity 0.75 mg/0.5 mL pen injector Inject 0.75 mg subcutaneously one time a week. INJECT 1 PEN WEEKLY      Social History     Tobacco Use    Smoking status: Never      Passive exposure: Never    Smokeless tobacco: Never   Substance Use Topics    Alcohol use: Never        Review of Systems    Constitutional :  As noted in HPI     All other systems have been reviewed and are negative for complaint       Physical Exam    Limited physical due to the virtual nature of this visit ( real time audio- visual )  Constitutional : Alert, in no distress     Pulm : Normal work of breathing   CNS : Moves all extremities symmetrically   Psych:  A , O X 3 normal mood and effect, speaks coherently     Assessment/Plan   Diagnoses and all orders for this visit:  Bronchitis  -     doxycycline (Vibramycin) 100 mg capsule; Take 1 capsule (100 mg) by mouth 2 times a day for 10 days. Take with at least 8 ounces (large glass) of water, do not lie down for 30 minutes after          Past 10 days since positive for Flu A   No indication for Tamiflu   Given the congested cough that is not yet productive , will rx as above   Mucinex dm suggested   Lingering cough and fatigue for upto 10-14 days as part of post viral inf discussed           Conditions addressed and mgmt as noted above.  Pertinent labs, images/ imaging reports , chart review was done .   Age appropriate labs / labs for mgmt of chronic medical conditions ordered, further mgmt pending the results.       This note is intended for the physician writing it, as well as to communicate findings to other healthcare professionals. These notes use medical lexicon that may be misunderstood by non medical persons. Therefore, interpretations of medical notes and terminology should be approached with caution.

## 2025-02-11 ENCOUNTER — APPOINTMENT (OUTPATIENT)
Dept: CARDIOLOGY | Facility: HOSPITAL | Age: 55
DRG: 674 | End: 2025-02-11
Payer: MEDICARE

## 2025-02-11 ENCOUNTER — HOSPITAL ENCOUNTER (INPATIENT)
Facility: HOSPITAL | Age: 55
DRG: 674 | End: 2025-02-11
Attending: EMERGENCY MEDICINE | Admitting: INTERNAL MEDICINE
Payer: MEDICARE

## 2025-02-11 ENCOUNTER — APPOINTMENT (OUTPATIENT)
Dept: RADIOLOGY | Facility: HOSPITAL | Age: 55
DRG: 674 | End: 2025-02-11
Payer: MEDICARE

## 2025-02-11 DIAGNOSIS — N19 UREMIA: ICD-10-CM

## 2025-02-11 DIAGNOSIS — D64.9 ANEMIA, UNSPECIFIED TYPE: ICD-10-CM

## 2025-02-11 DIAGNOSIS — N17.9 ACUTE RENAL FAILURE, UNSPECIFIED ACUTE RENAL FAILURE TYPE (CMS-HCC): Primary | ICD-10-CM

## 2025-02-11 DIAGNOSIS — R10.9 ABDOMINAL PAIN, UNSPECIFIED ABDOMINAL LOCATION: ICD-10-CM

## 2025-02-11 LAB
ALBUMIN SERPL BCP-MCNC: 2.9 G/DL (ref 3.4–5)
ALBUMIN SERPL BCP-MCNC: 2.9 G/DL (ref 3.4–5)
ALP SERPL-CCNC: 84 U/L (ref 33–110)
ALT SERPL W P-5'-P-CCNC: 8 U/L (ref 7–45)
ANION GAP BLDV CALCULATED.4IONS-SCNC: 16 MMOL/L (ref 10–25)
ANION GAP SERPL CALC-SCNC: 18 MMOL/L (ref 10–20)
ANION GAP SERPL CALC-SCNC: 19 MMOL/L (ref 10–20)
AST SERPL W P-5'-P-CCNC: 44 U/L (ref 9–39)
BASE EXCESS BLDV CALC-SCNC: -11.1 MMOL/L (ref -2–3)
BASOPHILS # BLD AUTO: 0.01 X10*3/UL (ref 0–0.1)
BASOPHILS NFR BLD AUTO: 0.1 %
BILIRUB SERPL-MCNC: 0.6 MG/DL (ref 0–1.2)
BODY TEMPERATURE: 37 DEGREES CELSIUS
BUN SERPL-MCNC: 103 MG/DL (ref 6–23)
BUN SERPL-MCNC: 103 MG/DL (ref 6–23)
CA-I BLDV-SCNC: 1.11 MMOL/L (ref 1.1–1.33)
CALCIUM SERPL-MCNC: 7.7 MG/DL (ref 8.6–10.3)
CALCIUM SERPL-MCNC: 7.9 MG/DL (ref 8.6–10.3)
CHLORIDE BLDV-SCNC: 112 MMOL/L (ref 98–107)
CHLORIDE SERPL-SCNC: 108 MMOL/L (ref 98–107)
CHLORIDE SERPL-SCNC: 109 MMOL/L (ref 98–107)
CO2 SERPL-SCNC: 16 MMOL/L (ref 21–32)
CO2 SERPL-SCNC: 16 MMOL/L (ref 21–32)
CREAT SERPL-MCNC: 13.55 MG/DL (ref 0.5–1.05)
CREAT SERPL-MCNC: 15.19 MG/DL (ref 0.5–1.05)
CRITICAL CALL TIME: 1508
CRITICAL CALLED BY: ABNORMAL
CRITICAL CALLED TO: ABNORMAL
CRITICAL READ BACK: ABNORMAL
EGFRCR SERPLBLD CKD-EPI 2021: 3 ML/MIN/1.73M*2
EGFRCR SERPLBLD CKD-EPI 2021: 3 ML/MIN/1.73M*2
EOSINOPHIL # BLD AUTO: 0.01 X10*3/UL (ref 0–0.7)
EOSINOPHIL NFR BLD AUTO: 0.1 %
ERYTHROCYTE [DISTWIDTH] IN BLOOD BY AUTOMATED COUNT: 12.1 % (ref 11.5–14.5)
GLUCOSE BLD MANUAL STRIP-MCNC: 92 MG/DL (ref 74–99)
GLUCOSE BLD MANUAL STRIP-MCNC: 95 MG/DL (ref 74–99)
GLUCOSE BLDV-MCNC: 140 MG/DL (ref 74–99)
GLUCOSE SERPL-MCNC: 149 MG/DL (ref 74–99)
GLUCOSE SERPL-MCNC: 96 MG/DL (ref 74–99)
HCO3 BLDV-SCNC: 15.5 MMOL/L (ref 22–26)
HCT VFR BLD AUTO: 21.8 % (ref 36–46)
HCT VFR BLD AUTO: 23.7 % (ref 36–46)
HCT VFR BLD EST: 23 % (ref 36–46)
HGB BLD-MCNC: 7.3 G/DL (ref 12–16)
HGB BLD-MCNC: 7.9 G/DL (ref 12–16)
HGB BLDV-MCNC: 7.5 G/DL (ref 12–16)
HGB RETIC QN: 29 PG (ref 28–38)
IMM GRANULOCYTES # BLD AUTO: 0.04 X10*3/UL (ref 0–0.7)
IMM GRANULOCYTES NFR BLD AUTO: 0.5 % (ref 0–0.9)
IMMATURE RETIC FRACTION: 7.1 %
INHALED O2 CONCENTRATION: 21 %
IRON SATN MFR SERPL: ABNORMAL %
IRON SERPL-MCNC: 134 UG/DL (ref 35–150)
LACTATE BLDV-SCNC: 0.5 MMOL/L (ref 0.4–2)
LACTATE SERPL-SCNC: 0.6 MMOL/L (ref 0.4–2)
LIPASE SERPL-CCNC: 65 U/L (ref 9–82)
LYMPHOCYTES # BLD AUTO: 0.43 X10*3/UL (ref 1.2–4.8)
LYMPHOCYTES NFR BLD AUTO: 5.7 %
MAGNESIUM SERPL-MCNC: 2.01 MG/DL (ref 1.6–2.4)
MCH RBC QN AUTO: 28.7 PG (ref 26–34)
MCHC RBC AUTO-ENTMCNC: 33.5 G/DL (ref 32–36)
MCV RBC AUTO: 86 FL (ref 80–100)
MONOCYTES # BLD AUTO: 0.34 X10*3/UL (ref 0.1–1)
MONOCYTES NFR BLD AUTO: 4.5 %
NEUTROPHILS # BLD AUTO: 6.68 X10*3/UL (ref 1.2–7.7)
NEUTROPHILS NFR BLD AUTO: 89.1 %
NRBC BLD-RTO: 0 /100 WBCS (ref 0–0)
OXYHGB MFR BLDV: 77.7 % (ref 45–75)
PCO2 BLDV: 37 MM HG (ref 41–51)
PH BLDV: 7.23 PH (ref 7.33–7.43)
PHOSPHATE SERPL-MCNC: 7.8 MG/DL (ref 2.5–4.9)
PLATELET # BLD AUTO: 332 X10*3/UL (ref 150–450)
PO2 BLDV: 48 MM HG (ref 35–45)
POTASSIUM BLDV-SCNC: 5.4 MMOL/L (ref 3.5–5.3)
POTASSIUM SERPL-SCNC: 4.8 MMOL/L (ref 3.5–5.3)
POTASSIUM SERPL-SCNC: 5.8 MMOL/L (ref 3.5–5.3)
PROT SERPL-MCNC: 7 G/DL (ref 6.4–8.2)
RBC # BLD AUTO: 2.54 X10*6/UL (ref 4–5.2)
RETICS #: 0.08 X10*6/UL (ref 0.02–0.08)
RETICS/RBC NFR AUTO: 3.1 % (ref 0.5–2)
SAO2 % BLDV: 80 % (ref 45–75)
SODIUM BLDV-SCNC: 138 MMOL/L (ref 136–145)
SODIUM SERPL-SCNC: 136 MMOL/L (ref 136–145)
SODIUM SERPL-SCNC: 139 MMOL/L (ref 136–145)
TIBC SERPL-MCNC: ABNORMAL UG/DL
UIBC SERPL-MCNC: <55 UG/DL (ref 110–370)
WBC # BLD AUTO: 7.5 X10*3/UL (ref 4.4–11.3)

## 2025-02-11 PROCEDURE — 87340 HEPATITIS B SURFACE AG IA: CPT | Mod: PARLAB | Performed by: INTERNAL MEDICINE

## 2025-02-11 PROCEDURE — 96375 TX/PRO/DX INJ NEW DRUG ADDON: CPT

## 2025-02-11 PROCEDURE — 80069 RENAL FUNCTION PANEL: CPT | Mod: CCI | Performed by: EMERGENCY MEDICINE

## 2025-02-11 PROCEDURE — 84100 ASSAY OF PHOSPHORUS: CPT | Performed by: NURSE PRACTITIONER

## 2025-02-11 PROCEDURE — 82746 ASSAY OF FOLIC ACID SERUM: CPT | Mod: PARLAB | Performed by: NURSE PRACTITIONER

## 2025-02-11 PROCEDURE — 82607 VITAMIN B-12: CPT | Mod: PARLAB | Performed by: NURSE PRACTITIONER

## 2025-02-11 PROCEDURE — 99222 1ST HOSP IP/OBS MODERATE 55: CPT | Performed by: NURSE PRACTITIONER

## 2025-02-11 PROCEDURE — 2500000004 HC RX 250 GENERAL PHARMACY W/ HCPCS (ALT 636 FOR OP/ED): Performed by: EMERGENCY MEDICINE

## 2025-02-11 PROCEDURE — 84466 ASSAY OF TRANSFERRIN: CPT | Mod: PARLAB | Performed by: NURSE PRACTITIONER

## 2025-02-11 PROCEDURE — 83605 ASSAY OF LACTIC ACID: CPT | Performed by: PHYSICIAN ASSISTANT

## 2025-02-11 PROCEDURE — 83690 ASSAY OF LIPASE: CPT | Performed by: PHYSICIAN ASSISTANT

## 2025-02-11 PROCEDURE — 2060000001 HC INTERMEDIATE ICU ROOM DAILY

## 2025-02-11 PROCEDURE — 96374 THER/PROPH/DIAG INJ IV PUSH: CPT

## 2025-02-11 PROCEDURE — 83735 ASSAY OF MAGNESIUM: CPT | Performed by: PHYSICIAN ASSISTANT

## 2025-02-11 PROCEDURE — 80053 COMPREHEN METABOLIC PANEL: CPT | Performed by: PHYSICIAN ASSISTANT

## 2025-02-11 PROCEDURE — 2500000004 HC RX 250 GENERAL PHARMACY W/ HCPCS (ALT 636 FOR OP/ED): Performed by: NURSE PRACTITIONER

## 2025-02-11 PROCEDURE — 36415 COLL VENOUS BLD VENIPUNCTURE: CPT | Performed by: PHYSICIAN ASSISTANT

## 2025-02-11 PROCEDURE — 82947 ASSAY GLUCOSE BLOOD QUANT: CPT

## 2025-02-11 PROCEDURE — 74176 CT ABD & PELVIS W/O CONTRAST: CPT

## 2025-02-11 PROCEDURE — 74176 CT ABD & PELVIS W/O CONTRAST: CPT | Performed by: RADIOLOGY

## 2025-02-11 PROCEDURE — 82728 ASSAY OF FERRITIN: CPT | Mod: PARLAB | Performed by: NURSE PRACTITIONER

## 2025-02-11 PROCEDURE — 85014 HEMATOCRIT: CPT | Performed by: NURSE PRACTITIONER

## 2025-02-11 PROCEDURE — 93005 ELECTROCARDIOGRAM TRACING: CPT

## 2025-02-11 PROCEDURE — 2500000001 HC RX 250 WO HCPCS SELF ADMINISTERED DRUGS (ALT 637 FOR MEDICARE OP): Performed by: NURSE PRACTITIONER

## 2025-02-11 PROCEDURE — 85045 AUTOMATED RETICULOCYTE COUNT: CPT | Performed by: NURSE PRACTITIONER

## 2025-02-11 PROCEDURE — 85025 COMPLETE CBC W/AUTO DIFF WBC: CPT | Performed by: PHYSICIAN ASSISTANT

## 2025-02-11 PROCEDURE — 86706 HEP B SURFACE ANTIBODY: CPT | Mod: PARLAB | Performed by: INTERNAL MEDICINE

## 2025-02-11 PROCEDURE — 99223 1ST HOSP IP/OBS HIGH 75: CPT | Performed by: NURSE PRACTITIONER

## 2025-02-11 PROCEDURE — 83550 IRON BINDING TEST: CPT | Performed by: NURSE PRACTITIONER

## 2025-02-11 PROCEDURE — 99285 EMERGENCY DEPT VISIT HI MDM: CPT | Mod: 25 | Performed by: EMERGENCY MEDICINE

## 2025-02-11 RX ORDER — INSULIN LISPRO 100 [IU]/ML
0-10 INJECTION, SOLUTION INTRAVENOUS; SUBCUTANEOUS
Status: DISCONTINUED | OUTPATIENT
Start: 2025-02-11 | End: 2025-02-19 | Stop reason: HOSPADM

## 2025-02-11 RX ORDER — DEXTROSE 50 % IN WATER (D50W) INTRAVENOUS SYRINGE
12.5
Status: DISCONTINUED | OUTPATIENT
Start: 2025-02-11 | End: 2025-02-19 | Stop reason: HOSPADM

## 2025-02-11 RX ORDER — AMLODIPINE BESYLATE 10 MG/1
10 TABLET ORAL DAILY
Status: DISCONTINUED | OUTPATIENT
Start: 2025-02-11 | End: 2025-02-19 | Stop reason: HOSPADM

## 2025-02-11 RX ORDER — MORPHINE SULFATE 4 MG/ML
4 INJECTION, SOLUTION INTRAMUSCULAR; INTRAVENOUS ONCE
Status: COMPLETED | OUTPATIENT
Start: 2025-02-11 | End: 2025-02-11

## 2025-02-11 RX ORDER — CARVEDILOL 12.5 MG/1
12.5 TABLET ORAL 2 TIMES DAILY
Status: DISCONTINUED | OUTPATIENT
Start: 2025-02-11 | End: 2025-02-19 | Stop reason: HOSPADM

## 2025-02-11 RX ORDER — DEXTROSE 50 % IN WATER (D50W) INTRAVENOUS SYRINGE
25
Status: DISCONTINUED | OUTPATIENT
Start: 2025-02-11 | End: 2025-02-19 | Stop reason: HOSPADM

## 2025-02-11 RX ORDER — POLYETHYLENE GLYCOL 3350 17 G/17G
17 POWDER, FOR SOLUTION ORAL DAILY
Status: DISCONTINUED | OUTPATIENT
Start: 2025-02-11 | End: 2025-02-19 | Stop reason: HOSPADM

## 2025-02-11 RX ORDER — ONDANSETRON HYDROCHLORIDE 2 MG/ML
4 INJECTION, SOLUTION INTRAVENOUS ONCE
Status: COMPLETED | OUTPATIENT
Start: 2025-02-11 | End: 2025-02-11

## 2025-02-11 RX ORDER — PANTOPRAZOLE SODIUM 40 MG/10ML
40 INJECTION, POWDER, LYOPHILIZED, FOR SOLUTION INTRAVENOUS 2 TIMES DAILY
Status: DISCONTINUED | OUTPATIENT
Start: 2025-02-11 | End: 2025-02-14 | Stop reason: ALTCHOICE

## 2025-02-11 RX ADMIN — ONDANSETRON 4 MG: 2 INJECTION INTRAMUSCULAR; INTRAVENOUS at 13:27

## 2025-02-11 RX ADMIN — SODIUM CHLORIDE 1000 ML: 9 INJECTION, SOLUTION INTRAVENOUS at 13:27

## 2025-02-11 RX ADMIN — AMLODIPINE BESYLATE 10 MG: 10 TABLET ORAL at 18:08

## 2025-02-11 RX ADMIN — CARVEDILOL 12.5 MG: 12.5 TABLET, FILM COATED ORAL at 20:39

## 2025-02-11 RX ADMIN — PANTOPRAZOLE SODIUM 40 MG: 40 INJECTION, POWDER, FOR SOLUTION INTRAVENOUS at 20:39

## 2025-02-11 RX ADMIN — MORPHINE SULFATE 4 MG: 4 INJECTION, SOLUTION INTRAMUSCULAR; INTRAVENOUS at 13:26

## 2025-02-11 SDOH — ECONOMIC STABILITY: HOUSING INSECURITY: IN THE LAST 12 MONTHS, WAS THERE A TIME WHEN YOU WERE NOT ABLE TO PAY THE MORTGAGE OR RENT ON TIME?: NO

## 2025-02-11 SDOH — SOCIAL STABILITY: SOCIAL INSECURITY: HAVE YOU HAD ANY THOUGHTS OF HARMING ANYONE ELSE?: NO

## 2025-02-11 SDOH — ECONOMIC STABILITY: FOOD INSECURITY: WITHIN THE PAST 12 MONTHS, YOU WORRIED THAT YOUR FOOD WOULD RUN OUT BEFORE YOU GOT THE MONEY TO BUY MORE.: NEVER TRUE

## 2025-02-11 SDOH — SOCIAL STABILITY: SOCIAL INSECURITY: ARE YOU OR HAVE YOU BEEN THREATENED OR ABUSED PHYSICALLY, EMOTIONALLY, OR SEXUALLY BY ANYONE?: NO

## 2025-02-11 SDOH — SOCIAL STABILITY: SOCIAL INSECURITY: DO YOU FEEL ANYONE HAS EXPLOITED OR TAKEN ADVANTAGE OF YOU FINANCIALLY OR OF YOUR PERSONAL PROPERTY?: NO

## 2025-02-11 SDOH — SOCIAL STABILITY: SOCIAL INSECURITY: WITHIN THE LAST YEAR, HAVE YOU BEEN HUMILIATED OR EMOTIONALLY ABUSED IN OTHER WAYS BY YOUR PARTNER OR EX-PARTNER?: NO

## 2025-02-11 SDOH — ECONOMIC STABILITY: INCOME INSECURITY: IN THE PAST 12 MONTHS HAS THE ELECTRIC, GAS, OIL, OR WATER COMPANY THREATENED TO SHUT OFF SERVICES IN YOUR HOME?: NO

## 2025-02-11 SDOH — ECONOMIC STABILITY: HOUSING INSECURITY: IN THE PAST 12 MONTHS, HOW MANY TIMES HAVE YOU MOVED WHERE YOU WERE LIVING?: 1

## 2025-02-11 SDOH — ECONOMIC STABILITY: HOUSING INSECURITY: AT ANY TIME IN THE PAST 12 MONTHS, WERE YOU HOMELESS OR LIVING IN A SHELTER (INCLUDING NOW)?: NO

## 2025-02-11 SDOH — ECONOMIC STABILITY: FOOD INSECURITY: WITHIN THE PAST 12 MONTHS, THE FOOD YOU BOUGHT JUST DIDN'T LAST AND YOU DIDN'T HAVE MONEY TO GET MORE.: NEVER TRUE

## 2025-02-11 SDOH — SOCIAL STABILITY: SOCIAL INSECURITY
WITHIN THE LAST YEAR, HAVE YOU BEEN KICKED, HIT, SLAPPED, OR OTHERWISE PHYSICALLY HURT BY YOUR PARTNER OR EX-PARTNER?: NO

## 2025-02-11 SDOH — SOCIAL STABILITY: SOCIAL INSECURITY: WITHIN THE LAST YEAR, HAVE YOU BEEN AFRAID OF YOUR PARTNER OR EX-PARTNER?: NO

## 2025-02-11 SDOH — ECONOMIC STABILITY: FOOD INSECURITY: HOW HARD IS IT FOR YOU TO PAY FOR THE VERY BASICS LIKE FOOD, HOUSING, MEDICAL CARE, AND HEATING?: NOT VERY HARD

## 2025-02-11 SDOH — SOCIAL STABILITY: SOCIAL INSECURITY: HAVE YOU HAD THOUGHTS OF HARMING ANYONE ELSE?: NO

## 2025-02-11 SDOH — SOCIAL STABILITY: SOCIAL INSECURITY: DOES ANYONE TRY TO KEEP YOU FROM HAVING/CONTACTING OTHER FRIENDS OR DOING THINGS OUTSIDE YOUR HOME?: NO

## 2025-02-11 SDOH — SOCIAL STABILITY: SOCIAL INSECURITY: WERE YOU ABLE TO COMPLETE ALL THE BEHAVIORAL HEALTH SCREENINGS?: YES

## 2025-02-11 SDOH — SOCIAL STABILITY: SOCIAL INSECURITY
WITHIN THE LAST YEAR, HAVE YOU BEEN RAPED OR FORCED TO HAVE ANY KIND OF SEXUAL ACTIVITY BY YOUR PARTNER OR EX-PARTNER?: NO

## 2025-02-11 SDOH — SOCIAL STABILITY: SOCIAL INSECURITY: DO YOU FEEL UNSAFE GOING BACK TO THE PLACE WHERE YOU ARE LIVING?: NO

## 2025-02-11 SDOH — SOCIAL STABILITY: SOCIAL INSECURITY: ABUSE: ADULT

## 2025-02-11 SDOH — SOCIAL STABILITY: SOCIAL INSECURITY: HAS ANYONE EVER THREATENED TO HURT YOUR FAMILY OR YOUR PETS?: NO

## 2025-02-11 SDOH — SOCIAL STABILITY: SOCIAL INSECURITY: ARE THERE ANY APPARENT SIGNS OF INJURIES/BEHAVIORS THAT COULD BE RELATED TO ABUSE/NEGLECT?: NO

## 2025-02-11 SDOH — ECONOMIC STABILITY: TRANSPORTATION INSECURITY: IN THE PAST 12 MONTHS, HAS LACK OF TRANSPORTATION KEPT YOU FROM MEDICAL APPOINTMENTS OR FROM GETTING MEDICATIONS?: NO

## 2025-02-11 ASSESSMENT — COGNITIVE AND FUNCTIONAL STATUS - GENERAL
DAILY ACTIVITIY SCORE: 24
MOBILITY SCORE: 24
PATIENT BASELINE BEDBOUND: NO
DAILY ACTIVITIY SCORE: 24
MOBILITY SCORE: 24

## 2025-02-11 ASSESSMENT — ACTIVITIES OF DAILY LIVING (ADL)
TOILETING: INDEPENDENT
ADEQUATE_TO_COMPLETE_ADL: YES
JUDGMENT_ADEQUATE_SAFELY_COMPLETE_DAILY_ACTIVITIES: YES
LACK_OF_TRANSPORTATION: NO
GROOMING: INDEPENDENT
LACK_OF_TRANSPORTATION: NO
PATIENT'S MEMORY ADEQUATE TO SAFELY COMPLETE DAILY ACTIVITIES?: YES
DRESSING YOURSELF: INDEPENDENT
FEEDING YOURSELF: INDEPENDENT
HEARING - LEFT EAR: FUNCTIONAL
WALKS IN HOME: INDEPENDENT
BATHING: INDEPENDENT
HEARING - RIGHT EAR: FUNCTIONAL

## 2025-02-11 ASSESSMENT — LIFESTYLE VARIABLES
HAVE YOU EVER FELT YOU SHOULD CUT DOWN ON YOUR DRINKING: NO
HOW OFTEN DO YOU HAVE 6 OR MORE DRINKS ON ONE OCCASION: NEVER
TOTAL SCORE: 0
HOW MANY STANDARD DRINKS CONTAINING ALCOHOL DO YOU HAVE ON A TYPICAL DAY: PATIENT DOES NOT DRINK
HOW OFTEN DO YOU HAVE A DRINK CONTAINING ALCOHOL: NEVER
SUBSTANCE_ABUSE_PAST_12_MONTHS: NO
AUDIT-C TOTAL SCORE: 0
PRESCIPTION_ABUSE_PAST_12_MONTHS: NO
AUDIT-C TOTAL SCORE: 0
SKIP TO QUESTIONS 9-10: 1
HAVE PEOPLE ANNOYED YOU BY CRITICIZING YOUR DRINKING: NO
EVER FELT BAD OR GUILTY ABOUT YOUR DRINKING: NO
EVER HAD A DRINK FIRST THING IN THE MORNING TO STEADY YOUR NERVES TO GET RID OF A HANGOVER: NO

## 2025-02-11 ASSESSMENT — COLUMBIA-SUICIDE SEVERITY RATING SCALE - C-SSRS
6. HAVE YOU EVER DONE ANYTHING, STARTED TO DO ANYTHING, OR PREPARED TO DO ANYTHING TO END YOUR LIFE?: NO
2. HAVE YOU ACTUALLY HAD ANY THOUGHTS OF KILLING YOURSELF?: NO
6. HAVE YOU EVER DONE ANYTHING, STARTED TO DO ANYTHING, OR PREPARED TO DO ANYTHING TO END YOUR LIFE?: NO
1. IN THE PAST MONTH, HAVE YOU WISHED YOU WERE DEAD OR WISHED YOU COULD GO TO SLEEP AND NOT WAKE UP?: NO
1. IN THE PAST MONTH, HAVE YOU WISHED YOU WERE DEAD OR WISHED YOU COULD GO TO SLEEP AND NOT WAKE UP?: NO
2. HAVE YOU ACTUALLY HAD ANY THOUGHTS OF KILLING YOURSELF?: NO

## 2025-02-11 ASSESSMENT — PATIENT HEALTH QUESTIONNAIRE - PHQ9
1. LITTLE INTEREST OR PLEASURE IN DOING THINGS: NOT AT ALL
2. FEELING DOWN, DEPRESSED OR HOPELESS: NOT AT ALL
SUM OF ALL RESPONSES TO PHQ9 QUESTIONS 1 & 2: 0

## 2025-02-11 ASSESSMENT — PAIN SCALES - GENERAL
PAINLEVEL_OUTOF10: 8
PAINLEVEL_OUTOF10: 2
PAINLEVEL_OUTOF10: 0 - NO PAIN

## 2025-02-11 ASSESSMENT — PAIN - FUNCTIONAL ASSESSMENT: PAIN_FUNCTIONAL_ASSESSMENT: 0-10

## 2025-02-11 NOTE — ED TRIAGE NOTES
The patient was seen and examined in triage.    History of Present Illness: The patient is a 54-year-old female presents emergency department for assessment of abdominal pain that started yesterday.  She reports that her abdomen feels very achy.  She has had some nausea.  She reports that she did have the flu a couple weeks ago and believes that maybe her stomach hurts because of this.  She denies diarrhea.  She reports history of hysterectomy.  She denies chest pains or shortness of breath.  Denies any fever or chills.    Brief Physical Exam:  Exam is limited by the patient sitting in a chair in triage.   Heart: Regular rate and rhythm.   Lungs: Clear to auscultation bilaterally.   Abdomen: Soft, nondistended.  Diffuse tenderness without signs of peritonitis    Plan: Appropriate labs and diagnostic imaging were ordered.      For the remainder of the patient's workup and ED course, please refer to the main ED provider note. We discussed need for diagnostic testing including laboratory studies and imaging.  We also discussed that they may be asked to wait in the waiting room while these tests are pending.  They understand that if they choose to leave without having the testing completed or resulted that we cannot rule out acute life threatening illnesses and the risks involved could lead to worsening condition, permanent disability or even death.      Disclaimer: This note was dictated by speech recognition. Minor errors in transcription may be present. Please call if questions.

## 2025-02-11 NOTE — ED TRIAGE NOTES
Pt presents to ED c/o aching abdominal pain that began last night. Pt reports pain throughout entire abdomen with tenderness upon palpation. Pt states she was dx with the flu 2 weeks ago. Pt reports mild diarrhea and cough. Denies n/v, CP, SOB.

## 2025-02-11 NOTE — ED PROVIDER NOTES
HPI   Chief Complaint   Patient presents with    Flu Symptoms    Abdominal Pain       HPI  Patient is a 54-year-old female presenting to the ED today for abdominal pain.  Patient states that she started developing abdominal pain last night.  She reports 2 episodes of diarrhea today, with associated nausea, but no episodes of vomiting.  Patient notes that she was diagnosed with the flu about 1.5 weeks ago.  She states that she is also currently on antibiotics prescribed by her PCP, which she believes is for the flu.  She has 1 day left of her antibiotics.  She notes that her cough is overall improving.  She denies any recent fever, chest pain, shortness of breath.  She denies any dysuria or hematuria      Patient History   Past Medical History:   Diagnosis Date    Arteriosclerosis of coronary artery 08/24/2023    Cataract     Chronic kidney disease, stage 3b (Multi) 03/21/2022    Stage 3b chronic kidney disease    Coronary artery disease involving native coronary artery of native heart without angina pectoris 11/30/2021    Formatting of this note might be different from the original. Impression: 1. There is severe diffuse disease in the small ramus branch. 2. Mild diffuse disease in the LAD and RCA. Recommended Treatment: Medical Therapy Last Assessment & Plan: Formatting of this note might be different from the original. Assessment: NSTEMI 11/2021 Impression: 1. There is severe diffuse disease in the small ramus br    Diabetic retinopathy (Multi)     Hydronephrosis 09/11/2022    Last Assessment & Plan: Formatting of this note might be different from the original. Scheduled for surgery on 9/29/2022    Obstruction of ureter 02/23/2024    Personal history of other endocrine, nutritional and metabolic disease 03/10/2022    History of vitamin D deficiency    Pyuria 09/11/2022    Retinal detachment     Sepsis due to Escherichia coli (Multi) 10/11/2022    Last Assessment & Plan: Formatting of this note might be different  from the original. +sepsis d/t E.Coli Pt states that she was hospitalized in 10/9/2022 for UTI, she was given IV antibiotics and most recent UC was negative. She denies any LUTS. Surgeon has ordered another UC, will obtain at pacc appt.    Ureteral stricture, right 12/07/2022    Last Assessment & Plan: Formatting of this note might be different from the original. Scheduled for surgery on 12/22/2022    Urinary tract infection 08/24/2023    Uterine leiomyoma 08/08/2022     Past Surgical History:   Procedure Laterality Date    IR CVC PICC  10/20/2022    IR CVC PICC    OTHER SURGICAL HISTORY  03/30/2022    No history of surgery    RETINAL LASER PROCEDURE Left     PRP     Family History   Problem Relation Name Age of Onset    Heart attack Mother       Social History     Tobacco Use    Smoking status: Never     Passive exposure: Never    Smokeless tobacco: Never   Vaping Use    Vaping status: Never Used   Substance Use Topics    Alcohol use: Never    Drug use: Not on file       Physical Exam   ED Triage Vitals [02/11/25 0844]   Temperature Heart Rate Respirations BP   36.4 °C (97.5 °F) 90 18 (!) 161/106      Pulse Ox Temp Source Heart Rate Source Patient Position   100 % Tympanic Monitor Sitting      BP Location FiO2 (%)     Right arm --       Physical Exam  Vitals and nursing note reviewed.   Constitutional:       General: She is not in acute distress.     Appearance: She is not toxic-appearing.   HENT:      Head: Normocephalic.      Mouth/Throat:      Mouth: Mucous membranes are moist.   Eyes:      Extraocular Movements: Extraocular movements intact.      Conjunctiva/sclera: Conjunctivae normal.   Cardiovascular:      Rate and Rhythm: Normal rate and regular rhythm.      Pulses: Normal pulses.   Pulmonary:      Effort: Pulmonary effort is normal. No respiratory distress.      Breath sounds: Normal breath sounds. No wheezing.   Abdominal:      General: There is no distension.      Palpations: Abdomen is soft.       Comments: Generalized tenderness to palpation, worse in the suprapubic region   Musculoskeletal:         General: No swelling.      Cervical back: Neck supple.   Skin:     General: Skin is warm and dry.      Capillary Refill: Capillary refill takes less than 2 seconds.   Neurological:      General: No focal deficit present.      Mental Status: She is alert. Mental status is at baseline.           ED Course & MDM   ED Course as of 02/11/25 1751   Tue Feb 11, 2025   1138 Comprehensive metabolic panel(!!)  Patient is found to be uremic with BUN of 103, elevated creatinine of 15, with GFR of 3.  Per chart review, patient has a history of CKD, stage V.  She was seen by Laurel Ledesma nephrology NP in September 2024.  Her creatinine is normally around 4-5 at baseline, with GFR of 10.  Patient is not currently on dialysis.  Given significant JOYCELYN today, as well as uremia, patient will likely require admission for nephrology evaluation.  She is started on IV fluids here in the ED. [VT]   1202 EKG obtained at 849, interpreted by myself.  Normal sinus rhythm with a ventricular rate of 90, left axis deviation, normal intervals, with no acute ischemic changes [VT]      ED Course User Index  [VT] Daphne ZAMBRANO MD         Diagnoses as of 02/11/25 1751   Acute renal failure, unspecified acute renal failure type (CMS-HCC)   Abdominal pain, unspecified abdominal location   Uremia   Anemia, unspecified type             No data recorded     [place] Coma Scale Score: 15 (02/11/25 0844 : Meghann Cano RN)                         Medical Decision Making  Patient was seen and evaluated for abdominal pain.  Differential diagnosis includes but is not limited to Gastritis, Appendicitis, Bowel Obstruction, AAA, Dissection, Viral Illness, Cholecystitis, Hernia, Colitis, Pancreatitis, Hepatitis, PUD, Ureteral stone, Perforated viscus, Diverticulitis, UTI.  Peripheral IV is established and patient is administered morphine and Zofran for pain and  nausea.  Additional labs and imaging are ordered for further evaluation of the patient's symptoms.    Patient's lab work shows anemia with a hemoglobin of 7.3.  CMP is concerning for uremia with creatinine of 15 and GFR of 3.  Lactic acid is normal at 0.6.  Magnesium is normal at 2.  Lipase is normal at 65.    CT abdomen pelvis wo IV contrast   Final Result   1. Cholelithiasis.   2. Anasarca/hypoproteinemia.   3. Status post hysterectomy.   4. Benign hepatic cyst.   5. Small pericardial effusion.        MACRO:   None        Signed by: Mary Medina 2/11/2025 1:25 PM   Dictation workstation:   [id number]        On reevaluation, patient is resting comfortably in bed.  She states that her pain has improved at this time after the morphine and Zofran. Patient was informed of their lab and imaging results, and all questions and concerns were answered. Admission planning for further management was discussed at this time, to which the patient was agreeable.  I discussed the case with Dr. Puri, hospitalist, who accepts patient for admission.    Procedure  Procedures     Daphne ZAMBRANO MD  02/11/25 0208

## 2025-02-11 NOTE — CONSULTS
"2.Reason For Consult  Dark stools, abdominal pain, anemia    History Of Present Illness  Yola Barillas is a 54 y.o. female with PMH of CKD 5, anemia, NSTEMI, HLD, CVA, depression with suicidal ideation, T2DM who presented to [facility] ER for evaluation of abdominal pain.  Patient reports abdominal pain began yesterday spontaneously.  She reports the pain felt like an aching, severity 10/10 at its worst, and was continuous until arriving to the ER receiving pain medication.  she reports associated nausea without vomiting and 2 episodes of dark diarrhea.      Examination patient reports that her abdominal pain is significantly improved at this time but believes mostly due to administered medications.  She points to lower abdomen/suprapubic area.  States lately she he has difficulties urinating when she \"feels an urge but cannot\", denies any dysuria or urgency, last urination immediately before arrival in ER.  She denies any upper abdomen/epigastric pain, heartburn, vomiting or dysphagia.  Bowel habits daily or sometimes skipping a day or 2/on rare occasions up to 3 days, takes bisacodyl and Colace.  Noticed stool approximately last couple weeks is somewhat looser and darker but not black, not more than 2 episodes a day.  Denies any previous history of overt GI bleed.  Does report taking oral iron supplementation for history of anemia.    No NSAIDs use  Is on baby aspirin and Plavix, last dose Sunday evening    No previous history of EGD or colonoscopy    Lab work on arrival shows H&H 7.3, previously 10.4 on 1/28/2025, baseline limited but available numbers are around 9-10.  No leukocytosis or thrombocytopenia.  Iron studies serum iron 111, TIBC 299, ferritin 356, saturation 37 in September 2024.  Creatinine 15.19, was 5.62 end on January.  .  Serum potassium 5.8.  AST 44, otherwise LFTs unremarkable, no previous elevation recorded.    UA pending.      CT abdomen pelvis revealed  cholelithiasis, " anasarca/hypoproteinemia, small pericardial effusion.  Bowels unremarkable    Past Medical History  She has a past medical history of Arteriosclerosis of coronary artery (08/24/2023), Cataract, Chronic kidney disease, stage 3b (Multi) (03/21/2022), Coronary artery disease involving native coronary artery of native heart without angina pectoris (11/30/2021), Diabetic retinopathy (Multi), Hydronephrosis (09/11/2022), Obstruction of ureter (02/23/2024), Personal history of other endocrine, nutritional and metabolic disease (03/10/2022), Pyuria (09/11/2022), Retinal detachment, Sepsis due to Escherichia coli (Multi) (10/11/2022), Ureteral stricture, right (12/07/2022), Urinary tract infection (08/24/2023), and Uterine leiomyoma (08/08/2022).    Surgical History  She has a past surgical history that includes Other surgical history (03/30/2022); IR CVC PICC (10/20/2022); and Retinal laser procedure (Left).     Social History  She reports that she has never smoked. She has never been exposed to tobacco smoke. She has never used smokeless tobacco. She reports that she does not drink alcohol. No history on file for drug use.    Family History  Family History   Problem Relation Name Age of Onset    Heart attack Mother          Allergies  Sulfamethoxazole-trimethoprim    Review of Systems    A 10 point review of system is negative except for what is mentioned in the HPI     Physical Exam     The note was created using voice recognition transcription software. Despite proofreading, unintentional typographical errors may be present. Please contact the GI office with any questions or concerns.     Current Medications: reviewed    Vital Signs: Reviewed    Physical Exam:  General: no apparent distress, pleasant and cooperative.  Mildly obese  Skin:  Warm and dry, no jaundice  HEENT: No scleral icterus, no conjunctival pallor, normocephalic, atraumatic, mucous membranes moist  Neck:  atraumatic, trachea midline, no JVD  Chest:   "decreased air entry to auscultation bilaterally. No wheezes, rales, or rhonchi  CV:  Regular rate and rhythm.  Positive S1/S2  Abdomen: no distension, +BS, soft, mildly tender to palpation in epigastrium and suprapubic area, no rebound tenderness, no guarding, no rigidity, no discernible ascites.  KYLE at bedside moderate amount of formed soft medium brown-greenish stool in rectal vault, no overt signs of blood  Extremities: no lower extremity edema, Chronic pigmentary changes, no cyanosis  Neurological:  A&Ox3 , no asterixis  Psychiatric: cooperative     Investigations:  Labs, radiological imaging and cardiac work up were reviewed     Last Recorded Vitals  Blood pressure (!) 200/93, pulse 85, temperature 36.4 °C (97.5 °F), temperature source Tympanic, resp. rate 13, height 1.6 m (5' 3\"), weight 79.4 kg (175 lb), SpO2 100%.    Relevant Results      Scheduled medications  amLODIPine, 10 mg, oral, Daily  carvedilol, 12.5 mg, oral, BID  insulin lispro, 0-10 Units, subcutaneous, TID AC  pantoprazole, 40 mg, intravenous, BID      Continuous medications     PRN medications  PRN medications: dextrose, dextrose, glucagon, glucagon    Results for orders placed or performed during the hospital encounter of 02/11/25 (from the past 24 hours)   Magnesium   Result Value Ref Range    Magnesium 2.01 1.60 - 2.40 mg/dL   Comprehensive metabolic panel   Result Value Ref Range    Glucose 149 (H) 74 - 99 mg/dL    Sodium 136 136 - 145 mmol/L    Potassium 5.8 (H) 3.5 - 5.3 mmol/L    Chloride 108 (H) 98 - 107 mmol/L    Bicarbonate 16 (L) 21 - 32 mmol/L    Anion Gap 18 10 - 20 mmol/L    Urea Nitrogen 103 (HH) 6 - 23 mg/dL    Creatinine 15.19 (H) 0.50 - 1.05 mg/dL    eGFR 3 (L) >60 mL/min/1.73m*2    Calcium 7.9 (L) 8.6 - 10.3 mg/dL    Albumin 2.9 (L) 3.4 - 5.0 g/dL    Alkaline Phosphatase 84 33 - 110 U/L    Total Protein 7.0 6.4 - 8.2 g/dL    AST 44 (H) 9 - 39 U/L    Bilirubin, Total 0.6 0.0 - 1.2 mg/dL    ALT 8 7 - 45 U/L   Lipase   Result " Value Ref Range    Lipase 65 9 - 82 U/L   Lactate   Result Value Ref Range    Lactate 0.6 0.4 - 2.0 mmol/L   CBC and Auto Differential   Result Value Ref Range    WBC 7.5 4.4 - 11.3 x10*3/uL    nRBC 0.0 0.0 - 0.0 /100 WBCs    RBC 2.54 (L) 4.00 - 5.20 x10*6/uL    Hemoglobin 7.3 (L) 12.0 - 16.0 g/dL    Hematocrit 21.8 (L) 36.0 - 46.0 %    MCV 86 80 - 100 fL    MCH 28.7 26.0 - 34.0 pg    MCHC 33.5 32.0 - 36.0 g/dL    RDW 12.1 11.5 - 14.5 %    Platelets 332 150 - 450 x10*3/uL    Neutrophils % 89.1 40.0 - 80.0 %    Immature Granulocytes %, Automated 0.5 0.0 - 0.9 %    Lymphocytes % 5.7 13.0 - 44.0 %    Monocytes % 4.5 2.0 - 10.0 %    Eosinophils % 0.1 0.0 - 6.0 %    Basophils % 0.1 0.0 - 2.0 %    Neutrophils Absolute 6.68 1.20 - 7.70 x10*3/uL    Immature Granulocytes Absolute, Automated 0.04 0.00 - 0.70 x10*3/uL    Lymphocytes Absolute 0.43 (L) 1.20 - 4.80 x10*3/uL    Monocytes Absolute 0.34 0.10 - 1.00 x10*3/uL    Eosinophils Absolute 0.01 0.00 - 0.70 x10*3/uL    Basophils Absolute 0.01 0.00 - 0.10 x10*3/uL   Reticulocytes   Result Value Ref Range    Retic % 3.1 (H) 0.5 - 2.0 %    Retic Absolute 0.079 0.018 - 0.083 x10*6/uL    Reticulocyte Hemoglobin 29 28 - 38 pg    Immature Retic fraction 7.1 <=16.0 %   BLOOD GAS VENOUS FULL PANEL   Result Value Ref Range    POCT pH, Venous 7.23 (LL) 7.33 - 7.43 pH    POCT pCO2, Venous 37 (L) 41 - 51 mm Hg    POCT pO2, Venous 48 (H) 35 - 45 mm Hg    POCT SO2, Venous 80 (H) 45 - 75 %    POCT Oxy Hemoglobin, Venous 77.7 (H) 45.0 - 75.0 %    POCT Hematocrit Calculated, Venous 23.0 (L) 36.0 - 46.0 %    POCT Sodium, Venous 138 136 - 145 mmol/L    POCT Potassium, Venous 5.4 (H) 3.5 - 5.3 mmol/L    POCT Chloride, Venous 112 (H) 98 - 107 mmol/L    POCT Ionized Calicum, Venous 1.11 1.10 - 1.33 mmol/L    POCT Glucose, Venous 140 (H) 74 - 99 mg/dL    POCT Lactate, Venous 0.5 0.4 - 2.0 mmol/L    POCT Base Excess, Venous -11.1 (L) -2.0 - 3.0 mmol/L    POCT HCO3 Calculated, Venous 15.5 (L) 22.0 -  26.0 mmol/L    POCT Hemoglobin, Venous 7.5 (L) 12.0 - 16.0 g/dL    POCT Anion Gap, Venous 16.0 10.0 - 25.0 mmol/L    Patient Temperature 37.0 degrees Celsius    FiO2 21 %    Critical Called By MANDEEP MAY RRT     Critical Called To DR AREVALO     Critical Call Time 1508     Critical Read Back Y           Assessment/Plan     Yola Barillas is a 54 y.o. female with PMH of CKD 5, anemia, NSTEMI, HLD, CVA, depression with suicidal ideation, T2DM who presented to [facility] ER for evaluation of abdominal pain.  Patient reports abdominal pain began yesterday spontaneously.  She reports the pain felt like an aching, severity 10/10 at its worst, and was continuous until arriving to the ER receiving pain medication.  she reports associated nausea without vomiting and 2 episodes of dark diarrhea.      #Abdominal pain, lower abdomen/suprapubic, currently improved  #Anemia  #Dark stools, reported  #Constipation  In a setting of JOYCELYN on CKD, pericardial effusion on imaging    KYLE at bedside did not demonstrate any overt signs of bleeding.  Need to rule out UTI/any obstruction, will refer to general medicine, UA pending  If more than 3 episodes of loose BMs a day will need to run stool infectious panels  No immediate/emergent need in endoscopic studies at this time  Will need to continue daily CBC, active type and screen on file, 2 large-bore IV access, replenish as appropriate, watch stool for overt blood  Iron studies from September 2024 inconsistent with STACY, repeat studies ordered/pending by emergency medicine  Miralax 17 gr PO daily  Medical, supportive care as per primary medicine  Cardiology, nephrology consulted    If no inpatient endoscopic studies, will need outpatient screening colonoscopy as patient never had one    Patient discussed with Dr. Zimmerman  Will follow        I spent 60 minutes in the professional and overall care of this patient.      Nicki Zhao, APRN-CNP

## 2025-02-11 NOTE — PROGRESS NOTES
Pharmacy Medication History Review    Yola Barillas is a 54 y.o. female admitted for No Principal Problem: There is no principal problem currently on the Problem List. Please update the Problem List and refresh.. Pharmacy reviewed the patient's [id number] medications and allergies for accuracy.    The list below reflectives the updated PTA list. Please review each medication in order reconciliation for additional clarification and justification.  Prior to Admission medications    Medication Sig Start Date End Date Taking? Authorizing Provider   acetaminophen (Tylenol) 500 mg tablet Take 1 tablet (500 mg) by mouth every 6 hours if needed for mild pain (1 - 3). 8/16/23  Yes Historical Provider, MD   amLODIPine (Norvasc) 10 mg tablet Take 1 tablet (10 mg) by mouth once daily. 9/25/24  Yes Melissa Luz MD   aspirin 81 mg chewable tablet Chew 1 tablet (81 mg) once daily. 12/1/21  Yes Historical Provider, MD   atorvastatin (Lipitor) 80 mg tablet Take 1 tablet (80 mg) by mouth once daily at bedtime.  Patient not taking: Reported on 2/3/2025 4/24/24  no Melissa Luz MD   bisacodyl (Dulcolax) 5 mg EC tablet Take 1 tablet (5 mg) by mouth once daily as needed for constipation. 9/25/24  no Melissa Luz MD   blood sugar diagnostic (Accu-Chek Guide test strips) strip Use to test blood sugar once a day 7/10/24  no Melissa Luz MD   blood-glucose meter (Accu-Chek Guide Glucose Meter) misc Use to test blood sugar once a day 7/10/24  no Melissa Luz MD   buPROPion SR (Wellbutrin SR) 200 mg 12 hr tablet Take 1 tablet (200 mg) by mouth once daily. 10/25/24  Yes Melissa Luz MD   carvedilol (Coreg) 12.5 mg tablet Take 1 tablet (12.5 mg) by mouth 2 times a day. 12/9/24  Yes Melissa Luz MD   clopidogrel (Plavix) 75 mg tablet Take 1 tablet (75 mg) by mouth once daily. 12/9/24  Yes Melissa Luz MD   docusate sodium (Colace) 100 mg capsule Take 1  capsule (100 mg) by mouth 2 times a day as needed for constipation. 9/25/24  no Melissa Luz MD   doxycycline (Vibramycin) 100 mg capsule Take 1 capsule (100 mg) by mouth 2 times a day for 10 days. Take with at least 8 ounces (large glass) of water, do not lie down for 30 minutes after 2/3/25 2/13/25 Yes Melissa Luz MD   ferrous sulfate, 325 mg ferrous sulfate, (FeroSuL) tablet TAKE 1 TABLET (325 MG) BY MOUTH ONCE DAILY WITH BREAKFAST.  Patient taking differently: Take 1 tablet (325 mg) by mouth once daily with breakfast. 12/9/24  Yes Melissa Luz MD   folic acid (Folvite) 1 mg tablet Take 1 tablet (1 mg) by mouth once daily. 12/9/24  Yes Melissa Luz MD   lancets (Accu-Chek Fastclix Lancet Drum) misc Use to test blood sugar once a day 7/10/24  yes Melissa Luz MD   lidocaine 4 % patch Place 1 patch over 12 hours on the skin once daily. Remove & discard patch within 12 hours or as directed by MD.  Patient not taking: Reported on 2/11/2025 1/28/25  no Luis Antonio Becerra DO   pantoprazole (ProtoNix) 20 mg EC tablet Take 1 tablet (20 mg) by mouth once daily.  Patient not taking: Reported on 2/11/2025 8/16/23  no Afsaneh Villagran MD   Trulicity 0.75 mg/0.5 mL pen injector Inject 0.75 mg subcutaneously one time a week. INJECT 1 PEN WEEKLY  Patient not taking: Reported on 1/8/2025 3/17/24  no    Trulicity 0.75 mg/0.5 mL pen injector Inject 0.75 mg subcutaneously one time a week. INJECT 1 PEN WEEKLY  Patient not taking: Reported on 1/8/2025 4/24/24  no Melissa Luz MD        The list below reflectives the updated allergy list. Please review each documented allergy for additional clarification and justification.  Allergies  Reviewed by Latoya Echeverria on 2/11/2025        Severity Reactions Comments    Sulfamethoxazole-trimethoprim High Angioedema             Below are additional concerns with the patient's PTA list.      Latoya Echeverria

## 2025-02-11 NOTE — H&P
History Of Present Illness  Yola Barillas is a 54 y.o. female presenting with With a past medical history of CKD stage V, anemia, NSTEMI, HLD, CVA, depression with suicidal ideation, T2DM who presents to [facility] [place] for evaluation of abdominal pain.  Patient reports abdominal pain began yesterday spontaneously.  She reports the pain felt like an aching, severity 10/10 at its worst, and was continuous until arriving to the ER receiving pain medication.  she reports associated nausea without vomiting and 2 episodes of diarrhea.  She report decreased appetite over the last couple weeks, she does report adequate fluid intake.  She reports dark stools.  She denies any chest pain, shortness of breath, she does reports intermittent dizziness, she denies headache.  She denies any fevers or chills.  She reports she has taken her blood pressure medication as prescribed other than today.    Of note patient recently diagnosed with the flu on 1/28.  Patient also recently on doxycycline for upper respiratory infection.    ED course: Afebrile, hypertensive at 207/97 otherwise hemodynamically stable.  Labs notable for glucose 149, potassium 5.8 (slightly hemolyzed), chloride 108, bicarb 16, , creatinine 15.19, hemoglobin 7.3 (1/20/2025 hemoglobin 10.4), hematocrit 21.8.  No leukocytosis.  UA pending.  CT abdomen pelvis revealing cholelithiasis, anasarca/hypoproteinemia, small pericardial effusion.  Morphine, Zofran, 1 L normal saline in ER.    10 point review of systems has been performed and is negative except what is stated above    Patient has been admitted to the care of Dr. Puri with consult placed to GI, cardiology, and nephrology.     Past Medical History  Past Medical History:   Diagnosis Date    Arteriosclerosis of coronary artery 08/24/2023    Cataract     Chronic kidney disease, stage 3b (Multi) 03/21/2022    Stage 3b chronic kidney disease    Coronary artery disease involving native coronary artery of native heart  without angina pectoris 11/30/2021    Formatting of this note might be different from the original. Impression: 1. There is severe diffuse disease in the small ramus branch. 2. Mild diffuse disease in the LAD and RCA. Recommended Treatment: Medical Therapy Last Assessment & Plan: Formatting of this note might be different from the original. Assessment: NSTEMI 11/2021 Impression: 1. There is severe diffuse disease in the small ramus br    Diabetic retinopathy (Multi)     Hydronephrosis 09/11/2022    Last Assessment & Plan: Formatting of this note might be different from the original. Scheduled for surgery on 9/29/2022    Obstruction of ureter 02/23/2024    Personal history of other endocrine, nutritional and metabolic disease 03/10/2022    History of vitamin D deficiency    Pyuria 09/11/2022    Retinal detachment     Sepsis due to Escherichia coli (Multi) 10/11/2022    Last Assessment & Plan: Formatting of this note might be different from the original. +sepsis d/t E.Coli Pt states that she was hospitalized in 10/9/2022 for UTI, she was given IV antibiotics and most recent UC was negative. She denies any LUTS. Surgeon has ordered another UC, will obtain at pacc appt.    Ureteral stricture, right 12/07/2022    Last Assessment & Plan: Formatting of this note might be different from the original. Scheduled for surgery on 12/22/2022    Urinary tract infection 08/24/2023    Uterine leiomyoma 08/08/2022       Surgical History  Past Surgical History:   Procedure Laterality Date    IR CVC PICC  10/20/2022    IR CVC PICC    OTHER SURGICAL HISTORY  03/30/2022    No history of surgery    RETINAL LASER PROCEDURE Left     PRP        Social History  She reports that she has never smoked. She has never been exposed to tobacco smoke. She has never used smokeless tobacco. She reports that she does not drink alcohol. No history on file for drug use.    Family History  Family History   Problem Relation Name Age of Onset    Heart attack  "Mother          Allergies  Sulfamethoxazole-trimethoprim       Physical Exam  Constitutional:       Appearance: She is obese.   HENT:      Head: Normocephalic and atraumatic.      Nose: Nose normal.      Mouth/Throat:      Mouth: Mucous membranes are moist.      Pharynx: Oropharynx is clear.   Eyes:      Extraocular Movements: Extraocular movements intact.   Cardiovascular:      Rate and Rhythm: Normal rate and regular rhythm.      Pulses: Normal pulses.      Heart sounds: Normal heart sounds.   Pulmonary:      Effort: Pulmonary effort is normal.      Breath sounds: Normal breath sounds.   Abdominal:      General: Bowel sounds are normal. There is distension.   Musculoskeletal:         General: Normal range of motion.      Cervical back: Normal range of motion and neck supple.   Skin:     General: Skin is warm.   Neurological:      General: No focal deficit present.      Mental Status: She is alert.          Last Recorded Vitals  Blood pressure (!) 200/93, pulse 85, temperature 36.4 °C (97.5 °F), temperature source Tympanic, resp. rate 13, height 1.6 m (5' 3\"), weight 79.4 kg (175 lb), SpO2 100%.    Relevant Results    Scheduled medications  amLODIPine, 10 mg, oral, Daily  carvedilol, 12.5 mg, oral, BID  insulin lispro, 0-10 Units, subcutaneous, TID AC  pantoprazole, 40 mg, intravenous, BID      Continuous medications     PRN medications  PRN medications: dextrose, dextrose, glucagon, glucagon    CT abdomen pelvis wo IV contrast    Result Date: 2/11/2025  Interpreted By:  Mary Medina, STUDY: CT ABDOMEN PELVIS WO IV CONTRAST;  2/11/2025 1:20 pm   INDICATION: Signs/Symptoms:abd pain.   COMPARISON: 04/16/2024   ACCESSION NUMBER(S): [id number]   ORDERING CLINICIAN: MAR AREVALO   TECHNIQUE: CT of the abdomen and pelvis was performed. No oral, no intravenous contrast.   FINDINGS: LOWER CHEST: Images of the lung bases show no infiltrate or pleural fluid. There is a small pericardial effusion, new   ABDOMEN:   LIVER: " There is a benign hepatic cyst.   BILE DUCTS: There is no intrahepatic, common hepatic or common bile ductal dilatation.   GALLBLADDER: There are multiple layering calcified gallstones in a distended but otherwise unremarkable gallbladder.   PANCREAS: The pancreas is unremarkable.   SPLEEN: The spleen is unremarkable. There is no splenic mass or splenomegaly.   ADRENAL GLANDS: The adrenal glands are unremarkable.   KIDNEYS AND URETERS: The kidneys demonstrate no mass.  There is no intrarenal calculus or hydronephrosis.   BOWEL: There is no bowel wall thickening, dilatation or obstruction. The appendix is normal.   VESSELS: There is marked atherosclerotic calcification of the vessels of the abdomen and pelvis. There is atherosclerotic calcification of the abdominal aorta common iliac and femoral arteries. There is calcification of the renal arteries, mesenteric arteries and splenic artery.   PERITONEUM/RETROPERITONEUM/LYMPH NODES: There is no retroperitoneal or pelvic adenopathy.  There is no ascites.   ABDOMINAL WALL: There is reticulation of the subcutaneous fat encircling the abdomen and pelvis extending into the proximal thighs consistent with anasarca/hypoproteinemia.   BONE AND SOFT TISSUE: There is no acute osseous finding.   There is no soft tissue abnormality. Status post hysterectomy.       1. Cholelithiasis. 2. Anasarca/hypoproteinemia. 3. Status post hysterectomy. 4. Benign hepatic cyst. 5. Small pericardial effusion.   MACRO: None   Signed by: Mary Medina 2/11/2025 1:25 PM Dictation workstation:   [id number]     Results for orders placed or performed during the hospital encounter of 02/11/25 (from the past 24 hours)   Magnesium   Result Value Ref Range    Magnesium 2.01 1.60 - 2.40 mg/dL   Comprehensive metabolic panel   Result Value Ref Range    Glucose 149 (H) 74 - 99 mg/dL    Sodium 136 136 - 145 mmol/L    Potassium 5.8 (H) 3.5 - 5.3 mmol/L    Chloride 108 (H) 98 - 107 mmol/L    Bicarbonate 16  (L) 21 - 32 mmol/L    Anion Gap 18 10 - 20 mmol/L    Urea Nitrogen 103 (HH) 6 - 23 mg/dL    Creatinine 15.19 (H) 0.50 - 1.05 mg/dL    eGFR 3 (L) >60 mL/min/1.73m*2    Calcium 7.9 (L) 8.6 - 10.3 mg/dL    Albumin 2.9 (L) 3.4 - 5.0 g/dL    Alkaline Phosphatase 84 33 - 110 U/L    Total Protein 7.0 6.4 - 8.2 g/dL    AST 44 (H) 9 - 39 U/L    Bilirubin, Total 0.6 0.0 - 1.2 mg/dL    ALT 8 7 - 45 U/L   Lipase   Result Value Ref Range    Lipase 65 9 - 82 U/L   Lactate   Result Value Ref Range    Lactate 0.6 0.4 - 2.0 mmol/L   CBC and Auto Differential   Result Value Ref Range    WBC 7.5 4.4 - 11.3 x10*3/uL    nRBC 0.0 0.0 - 0.0 /100 WBCs    RBC 2.54 (L) 4.00 - 5.20 x10*6/uL    Hemoglobin 7.3 (L) 12.0 - 16.0 g/dL    Hematocrit 21.8 (L) 36.0 - 46.0 %    MCV 86 80 - 100 fL    MCH 28.7 26.0 - 34.0 pg    MCHC 33.5 32.0 - 36.0 g/dL    RDW 12.1 11.5 - 14.5 %    Platelets 332 150 - 450 x10*3/uL    Neutrophils % 89.1 40.0 - 80.0 %    Immature Granulocytes %, Automated 0.5 0.0 - 0.9 %    Lymphocytes % 5.7 13.0 - 44.0 %    Monocytes % 4.5 2.0 - 10.0 %    Eosinophils % 0.1 0.0 - 6.0 %    Basophils % 0.1 0.0 - 2.0 %    Neutrophils Absolute 6.68 1.20 - 7.70 x10*3/uL    Immature Granulocytes Absolute, Automated 0.04 0.00 - 0.70 x10*3/uL    Lymphocytes Absolute 0.43 (L) 1.20 - 4.80 x10*3/uL    Monocytes Absolute 0.34 0.10 - 1.00 x10*3/uL    Eosinophils Absolute 0.01 0.00 - 0.70 x10*3/uL    Basophils Absolute 0.01 0.00 - 0.10 x10*3/uL   BLOOD GAS VENOUS FULL PANEL   Result Value Ref Range    POCT pH, Venous 7.23 (LL) 7.33 - 7.43 pH    POCT pCO2, Venous 37 (L) 41 - 51 mm Hg    POCT pO2, Venous 48 (H) 35 - 45 mm Hg    POCT SO2, Venous 80 (H) 45 - 75 %    POCT Oxy Hemoglobin, Venous 77.7 (H) 45.0 - 75.0 %    POCT Hematocrit Calculated, Venous 23.0 (L) 36.0 - 46.0 %    POCT Sodium, Venous 138 136 - 145 mmol/L    POCT Potassium, Venous 5.4 (H) 3.5 - 5.3 mmol/L    POCT Chloride, Venous 112 (H) 98 - 107 mmol/L    POCT Ionized Calicum, Venous 1.11  1.10 - 1.33 mmol/L    POCT Glucose, Venous 140 (H) 74 - 99 mg/dL    POCT Lactate, Venous 0.5 0.4 - 2.0 mmol/L    POCT Base Excess, Venous -11.1 (L) -2.0 - 3.0 mmol/L    POCT HCO3 Calculated, Venous 15.5 (L) 22.0 - 26.0 mmol/L    POCT Hemoglobin, Venous 7.5 (L) 12.0 - 16.0 g/dL    POCT Anion Gap, Venous 16.0 10.0 - 25.0 mmol/L    Patient Temperature 37.0 degrees Celsius    FiO2 21 %    Critical Called By MANDEEP MAY RRT     Critical Called To DR AREVALO     Critical Call Time 1508     Critical Read Back Y                 Assessment/Plan   Assessment & Plan  Acute renal failure, unspecified acute renal failure type (CMS-HCC)      Yola Barillas is a 54 y.o. female presenting with With a past medical history of CKD stage V, anemia, NSTEMI, HLD, CVA, depression with suicidal ideation, T2DM who [place] [facility] [place].  Patient reports abdominal pain began yesterday spontaneously.  She reports the pain felt like an aching, severity 10/10 at its worst, and was continuous until arriving to the ER receiving pain medication.  she reports associated nausea without vomiting and 2 episodes of diarrhea.  She report decreased appetite over the last couple weeks, she does report adequate fluid intake.  She reports dark stools.  She denies any chest pain, shortness of breath, she does reports intermittent dizziness, she denies headache.  She denies any fevers or chills.  She reports she has taken her blood pressure medication as prescribed other than today.    #JOYCELYN on CKD  #Hyperkalemia  #Abdominal pain  #Nausea  #Diarrhea  #Pericardial effusion  #Acute on chronic anemia  #Hypertension  #Melena reported  #Anasarca  #Cholelithiasis  Admit to telemetry  Consult nephrology/cardiology/GI appreciate input  Repeat potassium pending  Zofran as needed  Anemia panel  Stool for occult blood  Clear liquid diet  1 L normal saline in ER, awaiting further recommendations per nephrology  Continue home  amlodipine/Coreg  Hold home aspirin/Plavix 2/2 reported dark stool  IV Protonix twice daily  Add renal function panel    #T2DM  #CVA  #HLD  SSI  Hypoglycemic protocol  POC glucose  Continue home meds as appropriate    #DVT PPx  SCDs  Hold pharmacological AC 2/2 reported dark stools           I spent 35 minutes in the professional and overall care of this patient.      Katie Hitchcock, APRN-CNP

## 2025-02-11 NOTE — Clinical Note
Patient Clipped and Prepped: right subclavian. Prepped with ChloraPrep, a minimum of 3 minute dry time, longer if needed, no pooling noted, patient draped in sterile fashion. Right IJ

## 2025-02-12 LAB
AMORPH CRY #/AREA UR COMP ASSIST: ABNORMAL /HPF
ANION GAP SERPL CALC-SCNC: 17 MMOL/L (ref 10–20)
APPEARANCE UR: ABNORMAL
BACTERIA #/AREA URNS AUTO: ABNORMAL /HPF
BILIRUB UR STRIP.AUTO-MCNC: NEGATIVE MG/DL
BUN SERPL-MCNC: 103 MG/DL (ref 6–23)
CALCIUM SERPL-MCNC: 7.5 MG/DL (ref 8.6–10.3)
CHLORIDE SERPL-SCNC: 110 MMOL/L (ref 98–107)
CO2 SERPL-SCNC: 16 MMOL/L (ref 21–32)
COLOR UR: ABNORMAL
CREAT SERPL-MCNC: 13.47 MG/DL (ref 0.5–1.05)
EGFRCR SERPLBLD CKD-EPI 2021: 3 ML/MIN/1.73M*2
ERYTHROCYTE [DISTWIDTH] IN BLOOD BY AUTOMATED COUNT: 12.2 % (ref 11.5–14.5)
FERRITIN SERPL-MCNC: 1068 NG/ML (ref 8–150)
FOLATE SERPL-MCNC: 16.8 NG/ML
GLUCOSE BLD MANUAL STRIP-MCNC: 132 MG/DL (ref 74–99)
GLUCOSE BLD MANUAL STRIP-MCNC: 132 MG/DL (ref 74–99)
GLUCOSE BLD MANUAL STRIP-MCNC: 160 MG/DL (ref 74–99)
GLUCOSE BLD MANUAL STRIP-MCNC: 85 MG/DL (ref 74–99)
GLUCOSE SERPL-MCNC: 94 MG/DL (ref 74–99)
GLUCOSE UR STRIP.AUTO-MCNC: ABNORMAL MG/DL
HAV IGM SER QL: NONREACTIVE
HBV CORE IGM SER QL: NONREACTIVE
HBV SURFACE AB SER-ACNC: <3.1 MIU/ML
HBV SURFACE AG SERPL QL IA: NONREACTIVE
HBV SURFACE AG SERPL QL IA: NONREACTIVE
HCT VFR BLD AUTO: 22.1 % (ref 36–46)
HCV AB SER QL: NONREACTIVE
HGB BLD-MCNC: 7.2 G/DL (ref 12–16)
HOLD SPECIMEN: NORMAL
HYALINE CASTS #/AREA URNS AUTO: ABNORMAL /LPF
KETONES UR STRIP.AUTO-MCNC: NEGATIVE MG/DL
LEUKOCYTE ESTERASE UR QL STRIP.AUTO: ABNORMAL
MCH RBC QN AUTO: 28.6 PG (ref 26–34)
MCHC RBC AUTO-ENTMCNC: 32.6 G/DL (ref 32–36)
MCV RBC AUTO: 88 FL (ref 80–100)
MUCOUS THREADS #/AREA URNS AUTO: ABNORMAL /LPF
NITRITE UR QL STRIP.AUTO: NEGATIVE
NRBC BLD-RTO: 0 /100 WBCS (ref 0–0)
PH UR STRIP.AUTO: 6 [PH]
PLATELET # BLD AUTO: 294 X10*3/UL (ref 150–450)
POTASSIUM SERPL-SCNC: 5.2 MMOL/L (ref 3.5–5.3)
PROT UR STRIP.AUTO-MCNC: ABNORMAL MG/DL
PTH-INTACT SERPL-MCNC: 404.2 PG/ML (ref 18.5–88)
RBC # BLD AUTO: 2.52 X10*6/UL (ref 4–5.2)
RBC # UR STRIP.AUTO: ABNORMAL MG/DL
RBC #/AREA URNS AUTO: ABNORMAL /HPF
SODIUM SERPL-SCNC: 138 MMOL/L (ref 136–145)
SP GR UR STRIP.AUTO: 1.01
SQUAMOUS #/AREA URNS AUTO: ABNORMAL /HPF
TRANS CELLS #/AREA UR COMP ASSIST: ABNORMAL /HPF
TRANSFERRIN SERPL-MCNC: 147 MG/DL (ref 200–360)
UROBILINOGEN UR STRIP.AUTO-MCNC: NORMAL MG/DL
VIT B12 SERPL-MCNC: 1910 PG/ML (ref 211–911)
WBC # BLD AUTO: 5.8 X10*3/UL (ref 4.4–11.3)
WBC #/AREA URNS AUTO: ABNORMAL /HPF

## 2025-02-12 PROCEDURE — 80074 ACUTE HEPATITIS PANEL: CPT | Mod: PARLAB | Performed by: INTERNAL MEDICINE

## 2025-02-12 PROCEDURE — 2500000002 HC RX 250 W HCPCS SELF ADMINISTERED DRUGS (ALT 637 FOR MEDICARE OP, ALT 636 FOR OP/ED): Performed by: INTERNAL MEDICINE

## 2025-02-12 PROCEDURE — 2500000004 HC RX 250 GENERAL PHARMACY W/ HCPCS (ALT 636 FOR OP/ED): Performed by: NURSE PRACTITIONER

## 2025-02-12 PROCEDURE — 2500000004 HC RX 250 GENERAL PHARMACY W/ HCPCS (ALT 636 FOR OP/ED): Performed by: INTERNAL MEDICINE

## 2025-02-12 PROCEDURE — 2500000001 HC RX 250 WO HCPCS SELF ADMINISTERED DRUGS (ALT 637 FOR MEDICARE OP): Performed by: NURSE PRACTITIONER

## 2025-02-12 PROCEDURE — 36415 COLL VENOUS BLD VENIPUNCTURE: CPT | Performed by: INTERNAL MEDICINE

## 2025-02-12 PROCEDURE — 99232 SBSQ HOSP IP/OBS MODERATE 35: CPT | Performed by: NURSE PRACTITIONER

## 2025-02-12 PROCEDURE — 85027 COMPLETE CBC AUTOMATED: CPT | Performed by: NURSE PRACTITIONER

## 2025-02-12 PROCEDURE — 87086 URINE CULTURE/COLONY COUNT: CPT | Mod: PARLAB | Performed by: PHYSICIAN ASSISTANT

## 2025-02-12 PROCEDURE — 83970 ASSAY OF PARATHORMONE: CPT | Mod: PARLAB | Performed by: INTERNAL MEDICINE

## 2025-02-12 PROCEDURE — 82947 ASSAY GLUCOSE BLOOD QUANT: CPT

## 2025-02-12 PROCEDURE — 36415 COLL VENOUS BLD VENIPUNCTURE: CPT | Performed by: NURSE PRACTITIONER

## 2025-02-12 PROCEDURE — 99223 1ST HOSP IP/OBS HIGH 75: CPT | Performed by: INTERNAL MEDICINE

## 2025-02-12 PROCEDURE — 2500000002 HC RX 250 W HCPCS SELF ADMINISTERED DRUGS (ALT 637 FOR MEDICARE OP, ALT 636 FOR OP/ED): Performed by: NURSE PRACTITIONER

## 2025-02-12 PROCEDURE — 99222 1ST HOSP IP/OBS MODERATE 55: CPT | Performed by: NURSE PRACTITIONER

## 2025-02-12 PROCEDURE — 81001 URINALYSIS AUTO W/SCOPE: CPT | Performed by: PHYSICIAN ASSISTANT

## 2025-02-12 PROCEDURE — 2500000001 HC RX 250 WO HCPCS SELF ADMINISTERED DRUGS (ALT 637 FOR MEDICARE OP)

## 2025-02-12 PROCEDURE — 2060000001 HC INTERMEDIATE ICU ROOM DAILY

## 2025-02-12 PROCEDURE — 80048 BASIC METABOLIC PNL TOTAL CA: CPT | Performed by: NURSE PRACTITIONER

## 2025-02-12 RX ORDER — URSODIOL 300 MG/1
300 CAPSULE ORAL NIGHTLY
Status: DISCONTINUED | OUTPATIENT
Start: 2025-02-12 | End: 2025-02-19 | Stop reason: HOSPADM

## 2025-02-12 RX ORDER — URSODIOL 250 MG/1
250 TABLET, FILM COATED ORAL NIGHTLY
Status: DISCONTINUED | OUTPATIENT
Start: 2025-02-12 | End: 2025-02-12

## 2025-02-12 RX ORDER — HEPARIN SODIUM 1000 [USP'U]/ML
2200 INJECTION, SOLUTION INTRAVENOUS; SUBCUTANEOUS
Status: DISCONTINUED | OUTPATIENT
Start: 2025-02-13 | End: 2025-02-18

## 2025-02-12 RX ORDER — ACETAMINOPHEN 160 MG/5ML
650 SOLUTION ORAL EVERY 4 HOURS PRN
Status: DISCONTINUED | OUTPATIENT
Start: 2025-02-12 | End: 2025-02-19 | Stop reason: HOSPADM

## 2025-02-12 RX ORDER — ACETAMINOPHEN 650 MG/1
650 SUPPOSITORY RECTAL EVERY 4 HOURS PRN
Status: DISCONTINUED | OUTPATIENT
Start: 2025-02-12 | End: 2025-02-19 | Stop reason: HOSPADM

## 2025-02-12 RX ORDER — HEPARIN SODIUM 1000 [USP'U]/ML
2400 INJECTION, SOLUTION INTRAVENOUS; SUBCUTANEOUS
Status: DISCONTINUED | OUTPATIENT
Start: 2025-02-13 | End: 2025-02-18

## 2025-02-12 RX ORDER — ACETAMINOPHEN 325 MG/1
650 TABLET ORAL EVERY 4 HOURS PRN
Status: DISCONTINUED | OUTPATIENT
Start: 2025-02-12 | End: 2025-02-19 | Stop reason: HOSPADM

## 2025-02-12 RX ADMIN — ACETAMINOPHEN 650 MG: 325 TABLET, FILM COATED ORAL at 20:07

## 2025-02-12 RX ADMIN — PANTOPRAZOLE SODIUM 40 MG: 40 INJECTION, POWDER, FOR SOLUTION INTRAVENOUS at 08:10

## 2025-02-12 RX ADMIN — URSODIOL 300 MG: 300 CAPSULE ORAL at 20:07

## 2025-02-12 RX ADMIN — PIPERACILLIN SODIUM AND TAZOBACTAM SODIUM 2.25 G: 2; .25 INJECTION, SOLUTION INTRAVENOUS at 12:25

## 2025-02-12 RX ADMIN — PANTOPRAZOLE SODIUM 40 MG: 40 INJECTION, POWDER, FOR SOLUTION INTRAVENOUS at 20:07

## 2025-02-12 RX ADMIN — AMLODIPINE BESYLATE 10 MG: 10 TABLET ORAL at 08:09

## 2025-02-12 RX ADMIN — PIPERACILLIN SODIUM AND TAZOBACTAM SODIUM 2.25 G: 2; .25 INJECTION, SOLUTION INTRAVENOUS at 20:07

## 2025-02-12 RX ADMIN — INSULIN LISPRO 2 UNITS: 100 INJECTION, SOLUTION INTRAVENOUS; SUBCUTANEOUS at 16:00

## 2025-02-12 RX ADMIN — CARVEDILOL 12.5 MG: 12.5 TABLET, FILM COATED ORAL at 08:08

## 2025-02-12 RX ADMIN — POLYETHYLENE GLYCOL 3350 17 G: 17 POWDER, FOR SOLUTION ORAL at 08:12

## 2025-02-12 RX ADMIN — CARVEDILOL 12.5 MG: 12.5 TABLET, FILM COATED ORAL at 20:07

## 2025-02-12 ASSESSMENT — COGNITIVE AND FUNCTIONAL STATUS - GENERAL
MOBILITY SCORE: 24
MOBILITY SCORE: 23
CLIMB 3 TO 5 STEPS WITH RAILING: A LITTLE
DAILY ACTIVITIY SCORE: 24
DAILY ACTIVITIY SCORE: 24

## 2025-02-12 ASSESSMENT — PAIN SCALES - GENERAL
PAINLEVEL_OUTOF10: 1
PAINLEVEL_OUTOF10: 5 - MODERATE PAIN
PAINLEVEL_OUTOF10: 2

## 2025-02-12 ASSESSMENT — PAIN - FUNCTIONAL ASSESSMENT
PAIN_FUNCTIONAL_ASSESSMENT: 0-10
PAIN_FUNCTIONAL_ASSESSMENT: 0-10

## 2025-02-12 ASSESSMENT — PAIN DESCRIPTION - DESCRIPTORS: DESCRIPTORS: ACHING

## 2025-02-12 NOTE — PROGRESS NOTES
Consults  Vascular Surgery  Reason For Consult  Dialysis catheter    History Of Present Illness  Yola Barillas is a 54 y.o. female presenting with a past medical history of CKD stage V, anemia, NSTEMI, HLD, CVA, depression with suicidal ideation, 25-year history T2DM who presents to [facility] [place] for evaluation of abdominal pain.  Patient reports abdominal pain began yesterday spontaneously.  She reports the pain felt like an aching, severity 10/10 at its worst, and was continuous until arriving to the ER receiving pain medication.  she reports associated nausea without vomiting and 2 episodes of diarrhea.  She report decreased appetite over the last couple weeks, she does report adequate fluid intake.  She reports dark stools.  She denies any chest pain, shortness of breath, she does reports intermittent dizziness, she denies headache.  She denies any fevers or chills.  She reports she has taken her blood pressure medication as prescribed other than today.     Of note patient recently diagnosed with the flu on 1/28.  Patient also recently on doxycycline for upper respiratory infection.     ED course: Afebrile, hypertensive at 207/97 otherwise hemodynamically stable.  Labs notable for glucose 149, potassium 5.8 (slightly hemolyzed), chloride 108, bicarb 16, , creatinine 15.19, hemoglobin 7.3 (1/20/2025 hemoglobin 10.4), hematocrit 21.8.  No leukocytosis.  UA pending.  CT abdomen pelvis revealing cholelithiasis, anasarca/hypoproteinemia, small pericardial effusion.  Morphine, Zofran, 1 L normal saline in ER.  Patient was evaluated at bedside by Dr. Padron and this practitioner.  Information was obtained from chart review and patient interview.     Past Medical History  She has a past medical history of Arteriosclerosis of coronary artery (08/24/2023), Cataract, Chronic kidney disease, stage 3b (Multi) (03/21/2022), Coronary artery disease involving native coronary artery of native heart without angina pectoris  (11/30/2021), Diabetic retinopathy (Multi), Hydronephrosis (09/11/2022), Obstruction of ureter (02/23/2024), Personal history of other endocrine, nutritional and metabolic disease (03/10/2022), Pyuria (09/11/2022), Retinal detachment, Sepsis due to Escherichia coli (Multi) (10/11/2022), Ureteral stricture, right (12/07/2022), Urinary tract infection (08/24/2023), and Uterine leiomyoma (08/08/2022).    Surgical History  She has a past surgical history that includes Other surgical history (03/30/2022); IR CVC PICC (10/20/2022); and Retinal laser procedure (Left).     Social History  She reports that she has never smoked. She has never been exposed to tobacco smoke. She has never used smokeless tobacco. She reports that she does not drink alcohol. No history on file for drug use.    Family History  Family History   Problem Relation Name Age of Onset    Heart attack Mother          Allergies  Sulfamethoxazole-trimethoprim    Review of Systems  A 10 point ROS was performed with the patient denying any complaint at this time aside from those listed in the HPI above.  Physical Exam  Constitutional: Well developed , awake/alert/oriented x3, in no distress,  Eyes: Clear sclera  ENMT: mucous membranes are moist, no apparent injury, no lesions seen,   Head/neck: Neck supple, trachea  is midline, no apparent injury, no bruits, no mass, no stridor  Respiratory/thorax: Breath sounds clear and equal diminished bilaterally throughout, thorax symmetric  Cardiac/Vascular: Regular, rate and rhythm, no murmurs, 2+ radial pulses, palpable bilateral popliteals, 1+ DP and PTs  Gastrointestinal: Nondistended soft nontender, positive bowel sounds, no bruits.  Musculoskeletal: Moves all extremities, limited range of motion , no joint swelling,   Extremities: No cyanosis,   Neurological: Alert and oriented x3,   Lymphatic: No significant lymphadenopathy  Skin: Warm and dry, no lesions, no rashes  Psychological: Appropriate mood and  "behavior  Last Recorded Vitals  Blood pressure 146/71, pulse 78, temperature 36.8 °C (98.2 °F), temperature source Temporal, resp. rate 19, height 1.6 m (5' 3\"), weight 79.4 kg (175 lb), SpO2 98%.    Relevant Results     Results for orders placed or performed during the hospital encounter of 02/11/25 (from the past 24 hours)   POCT GLUCOSE   Result Value Ref Range    POCT Glucose 95 74 - 99 mg/dL   Hemoglobin and hematocrit, blood   Result Value Ref Range    Hemoglobin 7.9 (L) 12.0 - 16.0 g/dL    Hematocrit 23.7 (L) 36.0 - 46.0 %   Renal function panel   Result Value Ref Range    Glucose 96 74 - 99 mg/dL    Sodium 139 136 - 145 mmol/L    Potassium 4.8 3.5 - 5.3 mmol/L    Chloride 109 (H) 98 - 107 mmol/L    Bicarbonate 16 (L) 21 - 32 mmol/L    Anion Gap 19 10 - 20 mmol/L    Urea Nitrogen 103 (HH) 6 - 23 mg/dL    Creatinine 13.55 (H) 0.50 - 1.05 mg/dL    eGFR 3 (L) >60 mL/min/1.73m*2    Calcium 7.7 (L) 8.6 - 10.3 mg/dL    Phosphorus 7.8 (H) 2.5 - 4.9 mg/dL    Albumin 2.9 (L) 3.4 - 5.0 g/dL   Ferritin   Result Value Ref Range    Ferritin 1,068 (H) 8 - 150 ng/mL   Iron and TIBC   Result Value Ref Range    Iron 134 35 - 150 ug/dL    UIBC <55 (L) 110 - 370 ug/dL    TIBC      % Saturation     Folate   Result Value Ref Range    Folate, Serum 16.8 >5.0 ng/mL   Vitamin B12   Result Value Ref Range    Vitamin B12 1,910 (H) 211 - 911 pg/mL   Transferrin   Result Value Ref Range    Transferrin 147 (L) 200 - 360 mg/dL   Hepatitis B surface antigen   Result Value Ref Range    Hepatitis B Surface AG Nonreactive Nonreactive   Hepatitis B surface antibody   Result Value Ref Range    Hepatitis B Surface AB <3.1 <10.0 mIU/mL   POCT GLUCOSE   Result Value Ref Range    POCT Glucose 92 74 - 99 mg/dL   Urinalysis with Reflex Culture and Microscopic   Result Value Ref Range    Color, Urine Light-Orange (N) Light-Yellow, Yellow, Dark-Yellow    Appearance, Urine Turbid (N) Clear    Specific Gravity, Urine 1.015 1.005 - 1.035    pH, Urine 6.0 " 5.0, 5.5, 6.0, 6.5, 7.0, 7.5, 8.0    Protein, Urine 300 (3+) (A) NEGATIVE, 10 (TRACE), 20 (TRACE) mg/dL    Glucose, Urine 70 (1+) (A) Normal mg/dL    Blood, Urine 0.1 (1+) (A) NEGATIVE mg/dL    Ketones, Urine NEGATIVE NEGATIVE mg/dL    Bilirubin, Urine NEGATIVE NEGATIVE mg/dL    Urobilinogen, Urine Normal Normal mg/dL    Nitrite, Urine NEGATIVE NEGATIVE    Leukocyte Esterase, Urine 25 Jason/uL (A) NEGATIVE   Extra Urine Gray Tube   Result Value Ref Range    Extra Tube Hold for add-ons.    Microscopic Only, Urine   Result Value Ref Range    WBC, Urine 21-50 (A) 1-5, NONE /HPF    RBC, Urine 3-5 NONE, 1-2, 3-5 /HPF    Squamous Epithelial Cells, Urine 1-9 (SPARSE) Reference range not established. /HPF    Transitional Epithelial Cells, Urine 1-2 (FEW) Reference range not established. /HPF    Bacteria, Urine 4+ (A) NONE SEEN /HPF    Mucus, Urine FEW Reference range not established. /LPF    Hyaline Casts, Urine OCCASIONAL (A) NONE /LPF    Amorphous Crystals, Urine 1+ NONE, 1+, 2+ /HPF   Basic Metabolic Panel   Result Value Ref Range    Glucose 94 74 - 99 mg/dL    Sodium 138 136 - 145 mmol/L    Potassium 5.2 3.5 - 5.3 mmol/L    Chloride 110 (H) 98 - 107 mmol/L    Bicarbonate 16 (L) 21 - 32 mmol/L    Anion Gap 17 10 - 20 mmol/L    Urea Nitrogen 103 (HH) 6 - 23 mg/dL    Creatinine 13.47 (H) 0.50 - 1.05 mg/dL    eGFR 3 (L) >60 mL/min/1.73m*2    Calcium 7.5 (L) 8.6 - 10.3 mg/dL   CBC   Result Value Ref Range    WBC 5.8 4.4 - 11.3 x10*3/uL    nRBC 0.0 0.0 - 0.0 /100 WBCs    RBC 2.52 (L) 4.00 - 5.20 x10*6/uL    Hemoglobin 7.2 (L) 12.0 - 16.0 g/dL    Hematocrit 22.1 (L) 36.0 - 46.0 %    MCV 88 80 - 100 fL    MCH 28.6 26.0 - 34.0 pg    MCHC 32.6 32.0 - 36.0 g/dL    RDW 12.2 11.5 - 14.5 %    Platelets 294 150 - 450 x10*3/uL   POCT GLUCOSE   Result Value Ref Range    POCT Glucose 85 74 - 99 mg/dL   POCT GLUCOSE   Result Value Ref Range    POCT Glucose 132 (H) 74 - 99 mg/dL   POCT GLUCOSE   Result Value Ref Range    POCT Glucose 160 (H)  74 - 99 mg/dL       CT abdomen pelvis wo IV contrast    Result Date: 2/11/2025  Interpreted By:  Mary Medina, STUDY: CT ABDOMEN PELVIS WO IV CONTRAST;  2/11/2025 1:20 pm   INDICATION: Signs/Symptoms:abd pain.   COMPARISON: 04/16/2024   ACCESSION NUMBER(S): [id number]   ORDERING CLINICIAN: MAR AREVALO   TECHNIQUE: CT of the abdomen and pelvis was performed. No oral, no intravenous contrast.   FINDINGS: LOWER CHEST: Images of the lung bases show no infiltrate or pleural fluid. There is a small pericardial effusion, new   ABDOMEN:   LIVER: There is a benign hepatic cyst.   BILE DUCTS: There is no intrahepatic, common hepatic or common bile ductal dilatation.   GALLBLADDER: There are multiple layering calcified gallstones in a distended but otherwise unremarkable gallbladder.   PANCREAS: The pancreas is unremarkable.   SPLEEN: The spleen is unremarkable. There is no splenic mass or splenomegaly.   ADRENAL GLANDS: The adrenal glands are unremarkable.   KIDNEYS AND URETERS: The kidneys demonstrate no mass.  There is no intrarenal calculus or hydronephrosis.   BOWEL: There is no bowel wall thickening, dilatation or obstruction. The appendix is normal.   VESSELS: There is marked atherosclerotic calcification of the vessels of the abdomen and pelvis. There is atherosclerotic calcification of the abdominal aorta common iliac and femoral arteries. There is calcification of the renal arteries, mesenteric arteries and splenic artery.   PERITONEUM/RETROPERITONEUM/LYMPH NODES: There is no retroperitoneal or pelvic adenopathy.  There is no ascites.   ABDOMINAL WALL: There is reticulation of the subcutaneous fat encircling the abdomen and pelvis extending into the proximal thighs consistent with anasarca/hypoproteinemia.   BONE AND SOFT TISSUE: There is no acute osseous finding.   There is no soft tissue abnormality. Status post hysterectomy.       1. Cholelithiasis. 2. Anasarca/hypoproteinemia. 3. Status post hysterectomy. 4.  Benign hepatic cyst. 5. Small pericardial effusion.   MACRO: None   Signed by: Mary Medina 2/11/2025 1:25 PM Dictation workstation:   [id number]    ECG 12 lead    Result Date: 1/29/2025  Sinus tachycardia Probable left atrial enlargement Left anterior fascicular block    XR chest 2 views    Result Date: 1/28/2025  STUDY: Chest Radiographs;  1/28/2025 at 7:05 PM. INDICATION: Cough, flu. COMPARISON: None available. ACCESSION NUMBER(S): [id number] ORDERING CLINICIAN: NATACHA REYES TECHNIQUE:  Frontal and lateral chest. FINDINGS: CARDIOMEDIASTINAL SILHOUETTE: Cardiomediastinal silhouette is normal in size and configuration.  LUNGS: Lungs are clear.  ABDOMEN: No remarkable upper abdominal findings.  BONES: No acute osseous changes.  Moderate scoliosis present.    No focal infiltrate identified. Signed by Niko Marcos MD    Transthoracic Echo (TTE) Complete    Result Date: 1/22/2025   [facility], [address] [place] [address]           Tel [phone] and Fax [phone] TRANSTHORACIC ECHOCARDIOGRAM REPORT  Patient Name:       REHAN DOCTOR    Reading Physician:    Freddy Loza MD Study Date:         1/22/2025           Ordering Provider:    Freddy LOZA MRN/PID:            [id number]            Fellow: Accession#:         [id number]        Nurse: Date of Birth/Age:  1970 / 54 years Sonographer:          Amee Murphy RDCS Gender assigned at  F                   Additional Staff: Birth: Height:             162.56 cm           Admit Date: Weight:             79.38 kg            Admission Status: BSA / BMI:          1.85 m2 / 30.04     Encounter#:           [id number]                     kg/m2 Blood Pressure:     142/82 mmHg         Department Location:  [facility] Study Type:     TRANSTHORACIC ECHO (TTE) COMPLETE Diagnosis/ICD: Atherosclerotic heart disease of native coronary artery without                angina pectoris-I25.10; Old myocardial infarction-I25.2 Indication:    AS[facility] CPT Code:      Echo Complete w Full Doppler-[id number]  Study Detail: The following Echo studies were performed: 2D, M-Mode, Doppler and               color flow.  PHYSICIAN INTERPRETATION: Left Ventricle: Left ventricular ejection fraction is normal, by visual estimate at 65-70%. There is moderate concentric left ventricular hypertrophy. There are no regional left ventricular wall motion abnormalities. The left ventricular cavity size is normal. There is moderately increased septal and moderately increased posterior left ventricular wall thickness. Spectral Doppler shows a Grade I (impaired relaxation pattern) of left ventricular diastolic filling with normal left atrial filling pressure. Left Atrium: The left atrium is normal in size. Right Ventricle: The right ventricle is normal in size. There is normal right ventricular global systolic function. Right Atrium: The right atrium is normal in size. Aortic Valve: The aortic valve is trileaflet. There is moderate aortic valve thickening. There is evidence of mild aortic valve stenosis. The aortic valve dimensionless index is 0.62. There is no evidence of aortic valve regurgitation. The peak instantaneous gradient of the aortic valve is 20 mmHg. The mean gradient of the aortic valve is 10 mmHg. Mitral Valve: The mitral valve is moderately thickened with a myxomatous appearance. There is mitral valve prolapse. There is mild late systolic mitral valve prolapse of the anterior leaflet. The peak instantaneous gradient of the mitral valve is 8 mmHg. There is mild to moderate mitral valve regurgitation which is laterally directed. Tricuspid Valve: The tricuspid valve is structurally normal. There is trace to mild tricuspid regurgitation. Pulmonic Valve: The pulmonic valve is  structurally normal. There is trace to mild pulmonic valve regurgitation. Pericardium: There is no pericardial effusion noted. Aorta: The aortic root is normal. There is no dilatation of the aortic arch. There is no dilatation of the ascending aorta. There is no dilatation of the aortic root. Systemic Veins: The inferior vena cava appears normal in size.  CONCLUSIONS:  1. Left ventricular ejection fraction is normal, by visual estimate at 65-70%.  2. Spectral Doppler shows a Grade I (impaired relaxation pattern) of left ventricular diastolic filling with normal left atrial filling pressure.  3. There is moderately increased septal and moderately increased posterior left ventricular wall thickness.  4. There is moderate concentric left ventricular hypertrophy.  5. There is normal right ventricular global systolic function.  6. The mitral valve is moderately thickened with a myxomatous appearance.  7. Mild to moderate mitral valve regurgitation.  8. Mild aortic valve stenosis. QUANTITATIVE DATA SUMMARY:  2D MEASUREMENTS:          Normal Ranges: LAs:             3.48 cm  (2.7-4.0cm) RVIDd:           2.36 cm  (0.9-3.6cm) IVSd:            1.52 cm  (0.6-1.1cm) LVPWd:           1.50 cm  (0.6-1.1cm) LVIDd:           4.47 cm  (3.9-5.9cm) LVIDs:           3.04 cm LV Mass Index:   149 g/m2 LVEDV Index:     53 ml/m2 LV % FS          32.0 %  LA VOLUME:                    Normal Ranges: LA Vol A4C:        75.6 ml    (22+/-6mL/m2) LA Vol A2C:        96.6 ml LA Vol BP:         89.2 ml LA Vol Index A4C:  40.9 ml/m2 LA Vol Index A2C:  52.3 ml/m2 LA Vol Index BP:   48.3 ml/m2 LA Area A4C:       23.1 cm2 LA Area A2C:       25.0 cm2 LA Major Axis A4C: 6.0 cm LA Major Axis A2C: 5.5 cm LA Vol A4C:        74.4 ml LA Vol A2C:        87.9 ml LA Vol Index BSA:  43.9 ml/m2  RA VOLUME BY A/L METHOD:            Normal Ranges: RA Vol A4C:              34.7 ml    (8.3-19.5ml) RA Vol Index A4C:        18.8 ml/m2 RA Area A4C:             13.7 cm2 RA  Major Axis A4C:       4.6 cm  AORTA MEASUREMENTS:         Normal Ranges: Ao STJ, d:          2.70 cm (1.7-3.4cm) Asc Ao, d:          3.30 cm (2.1-3.4cm)  LV SYSTOLIC FUNCTION BY 2D PLANIMETRY (MOD):                      Normal Ranges: EF-A4C View:    60 % (>=55%) EF-A2C View:    65 % EF-Biplane:     67 % EF-Visual:      68 % LV EF Reported: 68 %  LV DIASTOLIC FUNCTION:           Normal Ranges: MV Peak E:             1.15 m/s  (0.7-1.2 m/s) MV Peak A:             1.37 m/s  (0.42-0.7 m/s) E/A Ratio:             0.84      (1.0-2.2) MV e'                  0.008 m/s (>8.0) MV lateral e'          0.01 m/s MV medial e'           0.01 m/s E/e' Ratio:            153.16    (<8.0)  MITRAL VALVE:          Normal Ranges: MV Vmax:      1.42 m/s (<=1.3m/s) MV peak P.1 mmHg (<5mmHg) MV mean PG:   3.6 mmHg (<2mmHg) MV VTI:       33.40 cm (10-13cm) MV DT:        167 msec (150-240msec)  AORTIC VALVE:                      Normal Ranges: AoV Vmax:                2.26 m/s  (<=1.7m/s) AoV Peak P.4 mmHg (<20mmHg) AoV Mean PG:             10.0 mmHg (1.7-11.5mmHg) LVOT Max Romeo:            1.45 m/s  (<=1.1m/s) AoV VTI:                 47.24 cm  (18-25cm) LVOT VTI:                29.45 cm LVOT Diameter:           2.04 cm   (1.8-2.4cm) AoV Area, VTI:           2.04 cm2  (2.5-5.5cm2) AoV Area,Vmax:           2.10 cm2  (2.5-4.5cm2) AoV Dimensionless Index: 0.62  RIGHT VENTRICLE: RV Basal 3.00 cm RV Mid   2.50 cm RV Major 6.4 cm TAPSE:   23.0 mm RV s'    0.16 m/s  TRICUSPID VALVE/RVSP:          Normal Ranges: Peak TR Velocity:     2.50 m/s RV Syst Pressure:     28 mmHg  (< 30mmHg)  AORTA: Asc Ao Diam 3.29 cm  [id number] Marcos Ma MD Electronically signed on 2025 at 12:54:55 PM  ** Final **      Assessment/Plan  1. Acute renal failure, unspecified acute renal failure type (CMS-HCC)        2. Abdominal pain, unspecified abdominal location        3. Uremia        4. Anemia, unspecified type        Patient was evaluated at  bedside by Dr. Padron this practitioner.  According to patient she does make some urine.  Patient's creatinine and BUN today was 13.47/103 on 9/12/2024 patient's creatinine being one 4.65/46.  Procedure was explained to patient.  Patient will be made n.p.o. after 7 AM tomorrow morning.  Due to limited scheduling openings in OR, Cath Lab, and IR vascular surgery will make every attempt to schedule place tunneled dialysis catheter tomorrow so patient can start hemodialysis.    Thank you very much for allowing Vascular Surgery to be involved in the care of your patient sincerely Rony GUO .  (This note was generated with voice recognition software and may contain errors including spelling, grammar, syntax and missed recognition of what was dictated, of which may not have been fully corrected)

## 2025-02-12 NOTE — CONSULTS
Consults  History Of Present Illness:    Yola Barillas is a 54 y.o. female presenting with abdominal pain and found to have a small pericardial effusion on CT abdomen of 2/11/25.  She is currently resting comfortably.  I saw her in the  office 1/8/25.     Last Recorded Vitals:  Vitals:    02/12/25 0721 02/12/25 0728 02/12/25 1045 02/12/25 1528   BP: 178/84  127/61 146/71   BP Location: Left arm  Left arm Right arm   Patient Position: Lying  Lying Lying   Pulse: 94  70 78   Resp:   18 19   Temp: 35.4 °C (95.7 °F)  35.6 °C (96.1 °F) 36.8 °C (98.2 °F)   TempSrc: Temporal  Temporal Temporal   SpO2: 100% 92% 99% 98%   Weight:       Height:           Last Labs:  CBC - 2/12/2025:  6:02 AM  5.8 7.2 294    22.1      CMP - 2/12/2025:  6:02 AM  7.5 7.0 44 --- 0.6   7.8 2.9 8 84      PTT - No results in last year.  _   _ _     Hemoglobin A1C   Date/Time Value Ref Range Status   10/25/2024 12:00 PM 4.9 See comment % Final     Comment:     Hemoglobin variant detected which does not interfere with determination of Hemoglobin A1c. Hemoglobin identification can be ordered to characterize the variant if clinically indicated.   05/02/2024 02:47 PM 5.2 see below % Final     Comment:     Hemoglobin variant detected which does not interfere with determination of Hemoglobin A1c. Hemoglobin identification can be ordered to characterize the variant if clinically indicated.     LDL Calculated   Date/Time Value Ref Range Status   10/25/2024 12:00  (H) <=99 mg/dL Final     Comment:                                 Near   Borderline      AGE      Desirable  Optimal    High     High     Very High     0-19 Y     0 - 109     ---    110-129   >/= 130     ----    20-24 Y     0 - 119     ---    120-159   >/= 160     ----      >24 Y     0 -  99   100-129  130-159   160-189     >/=190     05/02/2024 02:47  (H) <=99 mg/dL Final     Comment:                                 Near   Borderline      AGE      Desirable  Optimal    High     High      Very High     0-19 Y     0 - 109     ---    110-129   >/= 130     ----    20-24 Y     0 - 119     ---    120-159   >/= 160     ----      >24 Y     0 -  99   100-129  130-159   160-189     >/=190       VLDL   Date/Time Value Ref Range Status   10/25/2024 12:00 PM 24 0 - 40 mg/dL Final   05/02/2024 02:47 PM 16 0 - 40 mg/dL Final   03/09/2022 02:32 PM 28 0 - 40 mg/dL Final      Last I/O:  I/O last 3 completed shifts:  In: 240 (3 mL/kg) [P.O.:240]  Out: - (0 mL/kg)   Weight: 79.4 kg     Past Cardiology Tests (Last 3 Years):  EKG:  ECG 12 lead 01/28/2025 (Preliminary)      ECG 12 Lead 01/08/2025    Echo:  Transthoracic Echo (TTE) Complete 01/22/2025 normal LV systolic function and no pericardial effusion    Ejection Fractions:  EF   Date/Time Value Ref Range Status   01/22/2025 12:15 PM 68 %      Cath:  No results found for this or any previous visit from the past 1095 days.    Stress Test:  No results found for this or any previous visit from the past 1095 days.    Cardiac Imaging:  No results found for this or any previous visit from the past 1095 days.      Past Medical History:  She has a past medical history of Arteriosclerosis of coronary artery (08/24/2023), Cataract, Chronic kidney disease, stage 3b (Multi) (03/21/2022), Coronary artery disease involving native coronary artery of native heart without angina pectoris (11/30/2021), Diabetic retinopathy (Multi), Hydronephrosis (09/11/2022), Obstruction of ureter (02/23/2024), Personal history of other endocrine, nutritional and metabolic disease (03/10/2022), Pyuria (09/11/2022), Retinal detachment, Sepsis due to Escherichia coli (Multi) (10/11/2022), Ureteral stricture, right (12/07/2022), Urinary tract infection (08/24/2023), and Uterine leiomyoma (08/08/2022).    Past Surgical History:  She has a past surgical history that includes Other surgical history (03/30/2022); IR CVC PICC (10/20/2022); and Retinal laser procedure (Left).      Social History:  She  reports that she has never smoked. She has never been exposed to tobacco smoke. She has never used smokeless tobacco. She reports that she does not drink alcohol. No history on file for drug use.    Family History:  Family History   Problem Relation Name Age of Onset    Heart attack Mother          Allergies:  Sulfamethoxazole-trimethoprim    Inpatient Medications:  Scheduled medications   Medication Dose Route Frequency    amLODIPine  10 mg oral Daily    carvedilol  12.5 mg oral BID    [START ON 2/16/2025] epoetin mattie or biosimilar  20,000 Units subcutaneous Every Sunday    [START ON 2/13/2025] heparin  2,200 Units intra-catheter After Dialysis    [START ON 2/13/2025] heparin  2,400 Units intra-catheter After Dialysis    insulin lispro  0-10 Units subcutaneous TID AC    pantoprazole  40 mg intravenous BID    piperacillin-tazobactam  2.25 g intravenous q8h    polyethylene glycol  17 g oral Daily    ursodiol  300 mg oral Nightly     PRN medications   Medication    dextrose    dextrose    glucagon    glucagon     Continuous Medications   Medication Dose Last Rate     Outpatient Medications:  Current Outpatient Medications   Medication Instructions    acetaminophen (TYLENOL) 500 mg, oral, Every 6 hours PRN    amLODIPine (NORVASC) 10 mg, oral, Daily    aspirin 81 mg chewable tablet 1 tablet, oral, Daily    atorvastatin (LIPITOR) 80 mg, oral, Nightly    bisacodyl (DULCOLAX) 5 mg, oral, Daily PRN    blood sugar diagnostic (Accu-Chek Guide test strips) strip Use to test blood sugar once a day    blood-glucose meter (Accu-Chek Guide Glucose Meter) misc Use to test blood sugar once a day    buPROPion SR (WELLBUTRIN SR) 200 mg, oral, Daily    carvedilol (COREG) 12.5 mg, oral, 2 times daily    clopidogrel (PLAVIX) 75 mg, oral, Daily    docusate sodium (COLACE) 100 mg, oral, 2 times daily PRN    doxycycline (VIBRAMYCIN) 100 mg, oral, 2 times daily, Take with at least 8 ounces (large glass) of water, do not lie down for 30  minutes after    ferrous sulfate, 325 mg ferrous sulfate, (FeroSuL) tablet TAKE 1 TABLET (325 MG) BY MOUTH ONCE DAILY WITH BREAKFAST.    folic acid (FOLVITE) 1 mg, oral, Daily    lancets (Accu-Chek Fastclix Lancet Drum) misc Use to test blood sugar once a day    lidocaine 4 % patch 1 patch, transdermal, Daily, Remove & discard patch within 12 hours or as directed by MD.    pantoprazole (ProtoNix) 20 mg EC tablet 1 tablet, Daily    Trulicity 0.75 mg/0.5 mL pen injector Inject 0.75 mg subcutaneously one time a week. INJECT 1 PEN WEEKLY    Trulicity 0.75 mg/0.5 mL pen injector Inject 0.75 mg subcutaneously one time a week. INJECT 1 PEN WEEKLY       Physical Exam:  GEN: Chronicall ill appearing 54 yof lying flt comfortably  HENT: Atraumatic, normocephalic  COR: Reg     Assessment/Plan   1.) Small pericardial effusion likely uremic  2.) ASHD with stable NSR on tele review  3.) Severe hyperlipidemia  4.) CKD V  REC:  Proceed with dialysis           Thank y for the consultation                                         JLS  Peripheral IV 02/11/25 20 G Right Antecubital (Active)   Site Assessment Clean;Dry;Intact 02/12/25 0900   Dressing Status Clean;Dry 02/12/25 0900   Number of days: 1       Code Status:  No Order    I spent 30 minutes in the professional and overall care of this patient.        Marcos Ma MD

## 2025-02-12 NOTE — CONSULTS
Reason For Consult  Acute renal failure    History Of Present Illness  Yola Barillas is a 54 y.o. female past history of CKD stage V last creatinine 4.65 with a GFR of 10 in September 2024.  Patient developed an episode of influenza  Last couple weeks with a creatinine dropping down to 5.62.  So far she has declined initiation of renal replacement therapy.  Patient have hypertensive and diabetic nephropathy also history of obstructive uropathy with right ureteral stenosis status post stenting.         Patient presented to [facility] emergency room with complaints of severe abdominal pain patient endorses nausea and vomiting as well as diarrhea she denies fever or chills; radiographic study disclosed the following patient is full of gallstones.  Small hepatic cyst posterior pericardial effusion anasarca.  Patient also had a recent 2D echocardiogram in January 8, 2025 that showed ejection fraction of 65 to 70% mild aortic stenosis and mitral valve prolapse.  Patient denies dysuria hematuria metallic taste.  Patient urinalysis disclose pyuria with turbid appearance of the urine  Specific gravity 1.015 pH of   3+ protein   1+ glucose  1+ blood  Leukocyte esterases positive  And urinary sediment showed more than 21-50 WBCs per high-power field   3-5 RBCs per high-power field  4+ bacteria with 1+ amorphous crystals and hyaline cast  Serum creatinine today is 13.47 with a GFR of 3     Past Medical History  #1 chronic kidney disease stage V  #2 hypertension  #3 diabetes  Diabetic and hypertensive nephropathy  CVA 2019  Suicidal ideation  Non-STEMI November 21  Hydronephrosis with right ureteral stricture  Fibroid uterus  Hyperlipidemia  Vitamin D deficiency  Nonnephrotic range proteinuria  Goiter recent episode influenza A  In January 25, 2025    Surgical  Hysterectomy  Bilateral renal stents  Right ureteral stent exchange in October 16, 2023     Social History  She reports that she has never smoked. She has never been  "exposed to tobacco smoke. She has never used smokeless tobacco. She reports that she does not drink alcohol. No history on file for drug use.    Family History  Family History   Problem Relation Name Age of Onset    Heart attack Mother          Allergies  Sulfamethoxazole-trimethoprim    Review of Systems  10 point review of system performed and negative except for as stated in HPI     Physical Exam  General appearance; alert pleasant  HEENT; pupils react to light and accommodation  Neck; no JVD no bruit no thyromegaly  No cervical adenopathy  Lungs; clear to auscultation and percussion  Heart; regular rate no rubs no thrills no clicks  Abdomen; positive Hernandez's point  Active peristalsis no rebound or guarding  Extremities; 2+ edema  Neurological; no tremors no asterixis         I&O 24HR    Intake/Output Summary (Last 24 hours) at 2/12/2025 1112  Last data filed at 2/11/2025 2100  Gross per 24 hour   Intake 240 ml   Output --   Net 240 ml       Vitals 24HR  Heart Rate:  [70-95]   Temp:  [35.4 °C (95.7 °F)-36.7 °C (98.1 °F)]   Resp:  [10-33]   BP: (127-200)/(61-93)   Height:  [160 cm (5' 3\")]   Weight:  [79.4 kg (175 lb)]   SpO2:  [92 %-100 %]         Relevant Results  Results for orders placed or performed during the hospital encounter of 02/11/25 (from the past 24 hours)   CBC and Auto Differential   Result Value Ref Range    WBC 7.5 4.4 - 11.3 x10*3/uL    nRBC 0.0 0.0 - 0.0 /100 WBCs    RBC 2.54 (L) 4.00 - 5.20 x10*6/uL    Hemoglobin 7.3 (L) 12.0 - 16.0 g/dL    Hematocrit 21.8 (L) 36.0 - 46.0 %    MCV 86 80 - 100 fL    MCH 28.7 26.0 - 34.0 pg    MCHC 33.5 32.0 - 36.0 g/dL    RDW 12.1 11.5 - 14.5 %    Platelets 332 150 - 450 x10*3/uL    Neutrophils % 89.1 40.0 - 80.0 %    Immature Granulocytes %, Automated 0.5 0.0 - 0.9 %    Lymphocytes % 5.7 13.0 - 44.0 %    Monocytes % 4.5 2.0 - 10.0 %    Eosinophils % 0.1 0.0 - 6.0 %    Basophils % 0.1 0.0 - 2.0 %    Neutrophils Absolute 6.68 1.20 - 7.70 x10*3/uL    Immature " Granulocytes Absolute, Automated 0.04 0.00 - 0.70 x10*3/uL    Lymphocytes Absolute 0.43 (L) 1.20 - 4.80 x10*3/uL    Monocytes Absolute 0.34 0.10 - 1.00 x10*3/uL    Eosinophils Absolute 0.01 0.00 - 0.70 x10*3/uL    Basophils Absolute 0.01 0.00 - 0.10 x10*3/uL   Reticulocytes   Result Value Ref Range    Retic % 3.1 (H) 0.5 - 2.0 %    Retic Absolute 0.079 0.018 - 0.083 x10*6/uL    Reticulocyte Hemoglobin 29 28 - 38 pg    Immature Retic fraction 7.1 <=16.0 %   BLOOD GAS VENOUS FULL PANEL   Result Value Ref Range    POCT pH, Venous 7.23 (LL) 7.33 - 7.43 pH    POCT pCO2, Venous 37 (L) 41 - 51 mm Hg    POCT pO2, Venous 48 (H) 35 - 45 mm Hg    POCT SO2, Venous 80 (H) 45 - 75 %    POCT Oxy Hemoglobin, Venous 77.7 (H) 45.0 - 75.0 %    POCT Hematocrit Calculated, Venous 23.0 (L) 36.0 - 46.0 %    POCT Sodium, Venous 138 136 - 145 mmol/L    POCT Potassium, Venous 5.4 (H) 3.5 - 5.3 mmol/L    POCT Chloride, Venous 112 (H) 98 - 107 mmol/L    POCT Ionized Calicum, Venous 1.11 1.10 - 1.33 mmol/L    POCT Glucose, Venous 140 (H) 74 - 99 mg/dL    POCT Lactate, Venous 0.5 0.4 - 2.0 mmol/L    POCT Base Excess, Venous -11.1 (L) -2.0 - 3.0 mmol/L    POCT HCO3 Calculated, Venous 15.5 (L) 22.0 - 26.0 mmol/L    POCT Hemoglobin, Venous 7.5 (L) 12.0 - 16.0 g/dL    POCT Anion Gap, Venous 16.0 10.0 - 25.0 mmol/L    Patient Temperature 37.0 degrees Celsius    FiO2 21 %    Critical Called By MANDEEP MAY RRT     Critical Called To DR AREVALO     Critical Call Time 1508     Critical Read Back Y    POCT GLUCOSE   Result Value Ref Range    POCT Glucose 95 74 - 99 mg/dL   Hemoglobin and hematocrit, blood   Result Value Ref Range    Hemoglobin 7.9 (L) 12.0 - 16.0 g/dL    Hematocrit 23.7 (L) 36.0 - 46.0 %   Renal function panel   Result Value Ref Range    Glucose 96 74 - 99 mg/dL    Sodium 139 136 - 145 mmol/L    Potassium 4.8 3.5 - 5.3 mmol/L    Chloride 109 (H) 98 - 107 mmol/L    Bicarbonate 16 (L) 21 - 32 mmol/L    Anion Gap 19 10 - 20 mmol/L    Urea  Nitrogen 103 (HH) 6 - 23 mg/dL    Creatinine 13.55 (H) 0.50 - 1.05 mg/dL    eGFR 3 (L) >60 mL/min/1.73m*2    Calcium 7.7 (L) 8.6 - 10.3 mg/dL    Phosphorus 7.8 (H) 2.5 - 4.9 mg/dL    Albumin 2.9 (L) 3.4 - 5.0 g/dL   Ferritin   Result Value Ref Range    Ferritin 1,068 (H) 8 - 150 ng/mL   Iron and TIBC   Result Value Ref Range    Iron 134 35 - 150 ug/dL    UIBC <55 (L) 110 - 370 ug/dL    TIBC      % Saturation     Folate   Result Value Ref Range    Folate, Serum 16.8 >5.0 ng/mL   Vitamin B12   Result Value Ref Range    Vitamin B12 1,910 (H) 211 - 911 pg/mL   Transferrin   Result Value Ref Range    Transferrin 147 (L) 200 - 360 mg/dL   POCT GLUCOSE   Result Value Ref Range    POCT Glucose 92 74 - 99 mg/dL   Urinalysis with Reflex Culture and Microscopic   Result Value Ref Range    Color, Urine Light-Orange (N) Light-Yellow, Yellow, Dark-Yellow    Appearance, Urine Turbid (N) Clear    Specific Gravity, Urine 1.015 1.005 - 1.035    pH, Urine 6.0 5.0, 5.5, 6.0, 6.5, 7.0, 7.5, 8.0    Protein, Urine 300 (3+) (A) NEGATIVE, 10 (TRACE), 20 (TRACE) mg/dL    Glucose, Urine 70 (1+) (A) Normal mg/dL    Blood, Urine 0.1 (1+) (A) NEGATIVE mg/dL    Ketones, Urine NEGATIVE NEGATIVE mg/dL    Bilirubin, Urine NEGATIVE NEGATIVE mg/dL    Urobilinogen, Urine Normal Normal mg/dL    Nitrite, Urine NEGATIVE NEGATIVE    Leukocyte Esterase, Urine 25 Jason/uL (A) NEGATIVE   Microscopic Only, Urine   Result Value Ref Range    WBC, Urine 21-50 (A) 1-5, NONE /HPF    RBC, Urine 3-5 NONE, 1-2, 3-5 /HPF    Squamous Epithelial Cells, Urine 1-9 (SPARSE) Reference range not established. /HPF    Transitional Epithelial Cells, Urine 1-2 (FEW) Reference range not established. /HPF    Bacteria, Urine 4+ (A) NONE SEEN /HPF    Mucus, Urine FEW Reference range not established. /LPF    Hyaline Casts, Urine OCCASIONAL (A) NONE /LPF    Amorphous Crystals, Urine 1+ NONE, 1+, 2+ /HPF   Basic Metabolic Panel   Result Value Ref Range    Glucose 94 74 - 99 mg/dL     Sodium 138 136 - 145 mmol/L    Potassium 5.2 3.5 - 5.3 mmol/L    Chloride 110 (H) 98 - 107 mmol/L    Bicarbonate 16 (L) 21 - 32 mmol/L    Anion Gap 17 10 - 20 mmol/L    Urea Nitrogen 103 (HH) 6 - 23 mg/dL    Creatinine 13.47 (H) 0.50 - 1.05 mg/dL    eGFR 3 (L) >60 mL/min/1.73m*2    Calcium 7.5 (L) 8.6 - 10.3 mg/dL   CBC   Result Value Ref Range    WBC 5.8 4.4 - 11.3 x10*3/uL    nRBC 0.0 0.0 - 0.0 /100 WBCs    RBC 2.52 (L) 4.00 - 5.20 x10*6/uL    Hemoglobin 7.2 (L) 12.0 - 16.0 g/dL    Hematocrit 22.1 (L) 36.0 - 46.0 %    MCV 88 80 - 100 fL    MCH 28.6 26.0 - 34.0 pg    MCHC 32.6 32.0 - 36.0 g/dL    RDW 12.2 11.5 - 14.5 %    Platelets 294 150 - 450 x10*3/uL   POCT GLUCOSE   Result Value Ref Range    POCT Glucose 85 74 - 99 mg/dL       CT abdomen pelvis wo IV contrast    Result Date: 2/11/2025  Interpreted By:  Mary Medina, STUDY: CT ABDOMEN PELVIS WO IV CONTRAST;  2/11/2025 1:20 pm   INDICATION: Signs/Symptoms:abd pain.   COMPARISON: 04/16/2024   ACCESSION NUMBER(S): [id number]   ORDERING CLINICIAN: MAR AREVALO   TECHNIQUE: CT of the abdomen and pelvis was performed. No oral, no intravenous contrast.   FINDINGS: LOWER CHEST: Images of the lung bases show no infiltrate or pleural fluid. There is a small pericardial effusion, new   ABDOMEN:   LIVER: There is a benign hepatic cyst.   BILE DUCTS: There is no intrahepatic, common hepatic or common bile ductal dilatation.   GALLBLADDER: There are multiple layering calcified gallstones in a distended but otherwise unremarkable gallbladder.   PANCREAS: The pancreas is unremarkable.   SPLEEN: The spleen is unremarkable. There is no splenic mass or splenomegaly.   ADRENAL GLANDS: The adrenal glands are unremarkable.   KIDNEYS AND URETERS: The kidneys demonstrate no mass.  There is no intrarenal calculus or hydronephrosis.   BOWEL: There is no bowel wall thickening, dilatation or obstruction. The appendix is normal.   VESSELS: There is marked atherosclerotic calcification of  the vessels of the abdomen and pelvis. There is atherosclerotic calcification of the abdominal aorta common iliac and femoral arteries. There is calcification of the renal arteries, mesenteric arteries and splenic artery.   PERITONEUM/RETROPERITONEUM/LYMPH NODES: There is no retroperitoneal or pelvic adenopathy.  There is no ascites.   ABDOMINAL WALL: There is reticulation of the subcutaneous fat encircling the abdomen and pelvis extending into the proximal thighs consistent with anasarca/hypoproteinemia.   BONE AND SOFT TISSUE: There is no acute osseous finding.   There is no soft tissue abnormality. Status post hysterectomy.       1. Cholelithiasis. 2. Anasarca/hypoproteinemia. 3. Status post hysterectomy. 4. Benign hepatic cyst. 5. Small pericardial effusion.   MACRO: None   Signed by: Mary Medina 2/11/2025 1:25 PM Dictation workstation:   [id number]    ECG 12 lead    Result Date: 1/29/2025  Sinus tachycardia Probable left atrial enlargement Left anterior fascicular block    XR chest 2 views    Result Date: 1/28/2025  STUDY: Chest Radiographs;  1/28/2025 at 7:05 PM. INDICATION: Cough, flu. COMPARISON: None available. ACCESSION NUMBER(S): [id number] ORDERING CLINICIAN: NATACHA REYES TECHNIQUE:  Frontal and lateral chest. FINDINGS: CARDIOMEDIASTINAL SILHOUETTE: Cardiomediastinal silhouette is normal in size and configuration.  LUNGS: Lungs are clear.  ABDOMEN: No remarkable upper abdominal findings.  BONES: No acute osseous changes.  Moderate scoliosis present.    No focal infiltrate identified. Signed by Niko Marcos MD    Transthoracic Echo (TTE) Complete    Result Date: 1/22/2025   [facility], [address] [place] [address]           Tel [phone] and Fax [phone] TRANSTHORACIC ECHOCARDIOGRAM REPORT  Patient Name:       REHAN     Reading Physician:    [id number] Marcos Ma MD Study Date:         1/22/2025            Ordering Provider:    [id number] NAINA LOZA MRN/PID:            [id number]            Fellow: Accession#:         [id number]        Nurse: Date of Birth/Age:  1970 / 54 years Sonographer:          Amee Murphy                                                               RDCS Gender assigned at  F                   Additional Staff: Birth: Height:             162.56 cm           Admit Date: Weight:             79.38 kg            Admission Status: BSA / BMI:          1.85 m2 / 30.04     Encounter#:           [id number]                     kg/m2 Blood Pressure:     142/82 mmHg         Department [facility]  [facility] [place] Type:    TRANSTHORACIC ECHO (TTE) COMPLETE Diagnosis/ICD: Atherosclerotic heart disease of native coronary artery without                angina pectoris-I25.10; Old myocardial infarction-I25.2 Indication:    [facility] CPT Code:      Echo Complete w Full Doppler-52230  Study Detail: The following Echo studies were performed: 2D, M-Mode, Doppler and               color flow.  PHYSICIAN INTERPRETATION: Left Ventricle: Left ventricular ejection fraction is normal, by visual estimate at 65-70%. There is moderate concentric left ventricular hypertrophy. There are no regional left ventricular wall motion abnormalities. The left ventricular cavity size is normal. There is moderately increased septal and moderately increased posterior left ventricular wall thickness. Spectral Doppler shows a Grade I (impaired relaxation pattern) of left ventricular diastolic filling with normal left atrial filling pressure. Left Atrium: The left atrium is normal in size. Right Ventricle: The right ventricle is normal in size. There is normal right ventricular global systolic function. Right Atrium: The right atrium is normal in size. Aortic Valve: The aortic valve is trileaflet. There is moderate aortic valve thickening. There is evidence of mild  aortic valve stenosis. The aortic valve dimensionless index is 0.62. There is no evidence of aortic valve regurgitation. The peak instantaneous gradient of the aortic valve is 20 mmHg. The mean gradient of the aortic valve is 10 mmHg. Mitral Valve: The mitral valve is moderately thickened with a myxomatous appearance. There is mitral valve prolapse. There is mild late systolic mitral valve prolapse of the anterior leaflet. The peak instantaneous gradient of the mitral valve is 8 mmHg. There is mild to moderate mitral valve regurgitation which is laterally directed. Tricuspid Valve: The tricuspid valve is structurally normal. There is trace to mild tricuspid regurgitation. Pulmonic Valve: The pulmonic valve is structurally normal. There is trace to mild pulmonic valve regurgitation. Pericardium: There is no pericardial effusion noted. Aorta: The aortic root is normal. There is no dilatation of the aortic arch. There is no dilatation of the ascending aorta. There is no dilatation of the aortic root. Systemic Veins: The inferior vena cava appears normal in size.  CONCLUSIONS:  1. Left ventricular ejection fraction is normal, by visual estimate at 65-70%.  2. Spectral Doppler shows a Grade I (impaired relaxation pattern) of left ventricular diastolic filling with normal left atrial filling pressure.  3. There is moderately increased septal and moderately increased posterior left ventricular wall thickness.  4. There is moderate concentric left ventricular hypertrophy.  5. There is normal right ventricular global systolic function.  6. The mitral valve is moderately thickened with a myxomatous appearance.  7. Mild to moderate mitral valve regurgitation.  8. Mild aortic valve stenosis. QUANTITATIVE DATA SUMMARY:  2D MEASUREMENTS:          Normal Ranges: LAs:             3.48 cm  (2.7-4.0cm) RVIDd:           2.36 cm  (0.9-3.6cm) IVSd:            1.52 cm  (0.6-1.1cm) LVPWd:           1.50 cm  (0.6-1.1cm) LVIDd:            4.47 cm  (3.9-5.9cm) LVIDs:           3.04 cm LV Mass Index:   149 g/m2 LVEDV Index:     53 ml/m2 LV % FS          32.0 %  LA VOLUME:                    Normal Ranges: LA Vol A4C:        75.6 ml    (22+/-6mL/m2) LA Vol A2C:        96.6 ml LA Vol BP:         89.2 ml LA Vol Index A4C:  40.9 ml/m2 LA Vol Index A2C:  52.3 ml/m2 LA Vol Index BP:   48.3 ml/m2 LA Area A4C:       23.1 cm2 LA Area A2C:       25.0 cm2 LA Major Axis A4C: 6.0 cm LA Major Axis A2C: 5.5 cm LA Vol A4C:        74.4 ml LA Vol A2C:        87.9 ml LA Vol Index BSA:  43.9 ml/m2  RA VOLUME BY A/L METHOD:            Normal Ranges: RA Vol A4C:              34.7 ml    (8.3-19.5ml) RA Vol Index A4C:        18.8 ml/m2 RA Area A4C:             13.7 cm2 RA Major Axis A4C:       4.6 cm  AORTA MEASUREMENTS:         Normal Ranges: Ao STJ, d:          2.70 cm (1.7-3.4cm) Asc Ao, d:          3.30 cm (2.1-3.4cm)  LV SYSTOLIC FUNCTION BY 2D PLANIMETRY (MOD):                      Normal Ranges: EF-A4C View:    60 % (>=55%) EF-A2C View:    65 % EF-Biplane:     67 % EF-Visual:      68 % LV EF Reported: 68 %  LV DIASTOLIC FUNCTION:           Normal Ranges: MV Peak E:             1.15 m/s  (0.7-1.2 m/s) MV Peak A:             1.37 m/s  (0.42-0.7 m/s) E/A Ratio:             0.84      (1.0-2.2) MV e'                  0.008 m/s (>8.0) MV lateral e'          0.01 m/s MV medial e'           0.01 m/s E/e' Ratio:            153.16    (<8.0)  MITRAL VALVE:          Normal Ranges: MV Vmax:      1.42 m/s (<=1.3m/s) MV peak P.1 mmHg (<5mmHg) MV mean PG:   3.6 mmHg (<2mmHg) MV VTI:       33.40 cm (10-13cm) MV DT:        167 msec (150-240msec)  AORTIC VALVE:                      Normal Ranges: AoV Vmax:                2.26 m/s  (<=1.7m/s) AoV Peak P.4 mmHg (<20mmHg) AoV Mean PG:             10.0 mmHg (1.7-11.5mmHg) LVOT Max Romeo:            1.45 m/s  (<=1.1m/s) AoV VTI:                 47.24 cm  (18-25cm) LVOT VTI:                29.45 cm LVOT Diameter:            2.04 cm   (1.8-2.4cm) AoV Area, VTI:           2.04 cm2  (2.5-5.5cm2) AoV Area,Vmax:           2.10 cm2  (2.5-4.5cm2) AoV Dimensionless Index: 0.62  RIGHT VENTRICLE: RV Basal 3.00 cm RV Mid   2.50 cm RV Major 6.4 cm TAPSE:   23.0 mm RV s'    0.16 m/s  TRICUSPID VALVE/RVSP:          Normal Ranges: Peak TR Velocity:     2.50 m/s RV Syst Pressure:     28 mmHg  (< 30mmHg)  AORTA: Asc Ao Diam 3.29 cm  [id number] Marcos Ma MD Electronically signed on 1/22/2025 at 12:54:55 PM  ** Final **        Assessment/Plan   Acute kidney injury superimposed on chronic kidney disease stage V  Pyocystitis  History of right ureteral stenosis  Cholecystitis without cholangitis  Anemia of chronic disease and renal failure  Abdominal pain  Anasarca  Plan  Consult vascular for dialysis catheter  Zosyn for prior cystitis   Actigall  May require HIDA scan to rule out obstruction of the biliary system    Assessment & Plan  Acute renal failure, unspecified acute renal failure type (CMS-HCC)    Case was discussed with attending , patient and nurse  I spent 60 minutes in the professional and overall care of this patient.      Chinedu Bryant MD

## 2025-02-12 NOTE — PROGRESS NOTES
"Yola Barillas is a 54 y.o. female on day 1 of admission presenting with Acute renal failure, unspecified acute renal failure type (CMS-HCC).    Subjective   2/12/2025 reports feeling much better today, lower/suprapubic abdominal pain still there but significantly improved.  Denies any overt bleeding.  No reports of such from bedside staff.  H&H stable.  Abdomen soft, nontender, bowel sounds present.  No stool today.  Tolerates CLD well       Objective     A 10 point review of system is negative except for what is mentioned in the HPI     Physical Exam     The note was created using voice recognition transcription software. Despite proofreading, unintentional typographical errors may be present. Please contact the GI office with any questions or concerns.      Current Medications: reviewed     Vital Signs: Reviewed     Physical Exam:  General: no apparent distress, pleasant and cooperative.  Mildly obese  Skin:  Warm and dry, no jaundice  HEENT: No scleral icterus, no conjunctival pallor, normocephalic, atraumatic, mucous membranes moist  Neck:  atraumatic, trachea midline, no JVD  Chest:  decreased air entry to auscultation bilaterally. No wheezes, rales, or rhonchi  CV:  Regular rate and rhythm.  Positive S1/S2  Abdomen: no distension, +BS, soft, mildly tender to palpation in epigastrium and suprapubic area, no rebound tenderness, no guarding, no rigidity, no discernible ascites.  KYLE at bedside moderate amount of formed soft medium brown-greenish stool in rectal vault, no overt signs of blood  Extremities: no lower extremity edema, Chronic pigmentary changes, no cyanosis  Neurological:  A&Ox3 , no asterixis  Psychiatric: cooperative      Investigations:  Labs, radiological imaging and cardiac work up were reviewed    Last Recorded Vitals  Blood pressure 146/71, pulse 78, temperature 36.8 °C (98.2 °F), temperature source Temporal, resp. rate 19, height 1.6 m (5' 3\"), weight 79.4 kg (175 lb), SpO2 " 98%.  Intake/Output last 3 Shifts:  I/O last 3 completed shifts:  In: 240 (3 mL/kg) [P.O.:240]  Out: - (0 mL/kg)   Weight: 79.4 kg     Relevant Results        Scheduled medications  amLODIPine, 10 mg, oral, Daily  carvedilol, 12.5 mg, oral, BID  [START ON 2/16/2025] epoetin mattie or biosimilar, 20,000 Units, subcutaneous, Every Sunday  [START ON 2/13/2025] heparin, 2,200 Units, intra-catheter, After Dialysis  [START ON 2/13/2025] heparin, 2,400 Units, intra-catheter, After Dialysis  insulin lispro, 0-10 Units, subcutaneous, TID AC  pantoprazole, 40 mg, intravenous, BID  piperacillin-tazobactam, 2.25 g, intravenous, q8h  polyethylene glycol, 17 g, oral, Daily  ursodiol, 300 mg, oral, Nightly      Continuous medications     PRN medications  PRN medications: dextrose, dextrose, glucagon, glucagon    Results for orders placed or performed during the hospital encounter of 02/11/25 (from the past 24 hours)   Hemoglobin and hematocrit, blood   Result Value Ref Range    Hemoglobin 7.9 (L) 12.0 - 16.0 g/dL    Hematocrit 23.7 (L) 36.0 - 46.0 %   Renal function panel   Result Value Ref Range    Glucose 96 74 - 99 mg/dL    Sodium 139 136 - 145 mmol/L    Potassium 4.8 3.5 - 5.3 mmol/L    Chloride 109 (H) 98 - 107 mmol/L    Bicarbonate 16 (L) 21 - 32 mmol/L    Anion Gap 19 10 - 20 mmol/L    Urea Nitrogen 103 (HH) 6 - 23 mg/dL    Creatinine 13.55 (H) 0.50 - 1.05 mg/dL    eGFR 3 (L) >60 mL/min/1.73m*2    Calcium 7.7 (L) 8.6 - 10.3 mg/dL    Phosphorus 7.8 (H) 2.5 - 4.9 mg/dL    Albumin 2.9 (L) 3.4 - 5.0 g/dL   Ferritin   Result Value Ref Range    Ferritin 1,068 (H) 8 - 150 ng/mL   Iron and TIBC   Result Value Ref Range    Iron 134 35 - 150 ug/dL    UIBC <55 (L) 110 - 370 ug/dL    TIBC      % Saturation     Folate   Result Value Ref Range    Folate, Serum 16.8 >5.0 ng/mL   Vitamin B12   Result Value Ref Range    Vitamin B12 1,910 (H) 211 - 911 pg/mL   Transferrin   Result Value Ref Range    Transferrin 147 (L) 200 - 360 mg/dL    Hepatitis B surface antigen   Result Value Ref Range    Hepatitis B Surface AG Nonreactive Nonreactive   Hepatitis B surface antibody   Result Value Ref Range    Hepatitis B Surface AB <3.1 <10.0 mIU/mL   POCT GLUCOSE   Result Value Ref Range    POCT Glucose 92 74 - 99 mg/dL   Urinalysis with Reflex Culture and Microscopic   Result Value Ref Range    Color, Urine Light-Orange (N) Light-Yellow, Yellow, Dark-Yellow    Appearance, Urine Turbid (N) Clear    Specific Gravity, Urine 1.015 1.005 - 1.035    pH, Urine 6.0 5.0, 5.5, 6.0, 6.5, 7.0, 7.5, 8.0    Protein, Urine 300 (3+) (A) NEGATIVE, 10 (TRACE), 20 (TRACE) mg/dL    Glucose, Urine 70 (1+) (A) Normal mg/dL    Blood, Urine 0.1 (1+) (A) NEGATIVE mg/dL    Ketones, Urine NEGATIVE NEGATIVE mg/dL    Bilirubin, Urine NEGATIVE NEGATIVE mg/dL    Urobilinogen, Urine Normal Normal mg/dL    Nitrite, Urine NEGATIVE NEGATIVE    Leukocyte Esterase, Urine 25 Jason/uL (A) NEGATIVE   Extra Urine Gray Tube   Result Value Ref Range    Extra Tube Hold for add-ons.    Microscopic Only, Urine   Result Value Ref Range    WBC, Urine 21-50 (A) 1-5, NONE /HPF    RBC, Urine 3-5 NONE, 1-2, 3-5 /HPF    Squamous Epithelial Cells, Urine 1-9 (SPARSE) Reference range not established. /HPF    Transitional Epithelial Cells, Urine 1-2 (FEW) Reference range not established. /HPF    Bacteria, Urine 4+ (A) NONE SEEN /HPF    Mucus, Urine FEW Reference range not established. /LPF    Hyaline Casts, Urine OCCASIONAL (A) NONE /LPF    Amorphous Crystals, Urine 1+ NONE, 1+, 2+ /HPF   Basic Metabolic Panel   Result Value Ref Range    Glucose 94 74 - 99 mg/dL    Sodium 138 136 - 145 mmol/L    Potassium 5.2 3.5 - 5.3 mmol/L    Chloride 110 (H) 98 - 107 mmol/L    Bicarbonate 16 (L) 21 - 32 mmol/L    Anion Gap 17 10 - 20 mmol/L    Urea Nitrogen 103 (HH) 6 - 23 mg/dL    Creatinine 13.47 (H) 0.50 - 1.05 mg/dL    eGFR 3 (L) >60 mL/min/1.73m*2    Calcium 7.5 (L) 8.6 - 10.3 mg/dL   CBC   Result Value Ref Range    WBC 5.8  4.4 - 11.3 x10*3/uL    nRBC 0.0 0.0 - 0.0 /100 WBCs    RBC 2.52 (L) 4.00 - 5.20 x10*6/uL    Hemoglobin 7.2 (L) 12.0 - 16.0 g/dL    Hematocrit 22.1 (L) 36.0 - 46.0 %    MCV 88 80 - 100 fL    MCH 28.6 26.0 - 34.0 pg    MCHC 32.6 32.0 - 36.0 g/dL    RDW 12.2 11.5 - 14.5 %    Platelets 294 150 - 450 x10*3/uL   POCT GLUCOSE   Result Value Ref Range    POCT Glucose 85 74 - 99 mg/dL   PTH, Intact   Result Value Ref Range    Parathyroid Hormone, Intact 404.2 (H) 18.5 - 88.0 pg/mL   POCT GLUCOSE   Result Value Ref Range    POCT Glucose 132 (H) 74 - 99 mg/dL   POCT GLUCOSE   Result Value Ref Range    POCT Glucose 160 (H) 74 - 99 mg/dL         * Cannot find OR log *  Last relevant procedure:        This patient currently has cardiac telemetry ordered; if you would like to modify or discontinue the telemetry order, click here to go to the orders activity to modify/discontinue the order.      Assessment/Plan   Assessment & Plan  Acute renal failure, unspecified acute renal failure type (CMS-HCC)    Yola Barillas is a 54 y.o. female with PMH of CKD 5, anemia, NSTEMI, HLD, CVA, depression with suicidal ideation, T2DM who presented to [facility] ER 2/11/2025 for evaluation of abdominal pain.    she reports associated nausea without vomiting and 2 episodes of dark diarrhea.       #Abdominal pain, lower abdomen/suprapubic,  improved  #Anemia  #Dark stools, reported  #Constipation  In a setting of JOYCELYN on CKD, pericardial effusion on imaging     KYLE at bedside no overt signs of bleeding.  H&H stable.    Iron studies inconsistent with STACY  No immediate/emergent need in endoscopic studies at this time  Continue daily CBC, active type and screen on file, 2 large-bore IV access, replenish as appropriate, watch stool for overt blood  Continue Miralax 17 gr PO daily  Medical, supportive care as per primary medicine  Cardiology, nephrology on team    If no inpatient endoscopic studies, will need outpatient follow-up for anemia investigation  and screening colonoscopy as patient never had one.  Request sent to GI      Patient discussed with Dr. Zimmerman  GI to sign off at this time.  Please do not hesitate to reconsult/reach out with questions should any arise       I spent 30 minutes in the professional and overall care of this patient.      Nicki Zhao, APRN-CNP

## 2025-02-12 NOTE — CARE PLAN
The patient's goals for the shift include      The clinical goals for the shift include remain HDS      Problem: Pain - Adult  Goal: Verbalizes/displays adequate comfort level or baseline comfort level  Outcome: Progressing     Problem: Safety - Adult  Goal: Free from fall injury  Outcome: Progressing     Problem: Discharge Planning  Goal: Discharge to home or other facility with appropriate resources  Outcome: Progressing     Problem: Chronic Conditions and Co-morbidities  Goal: Patient's chronic conditions and co-morbidity symptoms are monitored and maintained or improved  Outcome: Progressing     Problem: Nutrition  Goal: Nutrient intake appropriate for maintaining nutritional needs  Outcome: Progressing       Over the shift, the patient did not make progress toward the following goals. Barriers to progression include    fair balance

## 2025-02-12 NOTE — CARE PLAN
The patient's goals for the shift include  rest    The clinical goals for the shift include rest and comfort      Problem: Pain - Adult  Goal: Verbalizes/displays adequate comfort level or baseline comfort level  Outcome: Progressing     Problem: Safety - Adult  Goal: Free from fall injury  Outcome: Progressing     Problem: Nutrition  Goal: Nutrient intake appropriate for maintaining nutritional needs  Outcome: Progressing

## 2025-02-12 NOTE — PROGRESS NOTES
02/12/25 1235   Discharge Planning   Living Arrangements Alone   Support Systems Family members   Type of Residence Private residence   Home or Post Acute Services None   Expected Discharge Disposition Home     Care transitions assessment completed via bedside. Demographics verified. Introductions to self and role.  Pt state they are independent with dressing and bathing. Pt from home alone. Insurance [employer], PCP: Melissa Pereyra MD. Dispo Home no needs when ready. Care transitions will continue to follow.

## 2025-02-12 NOTE — PROGRESS NOTES
Yola Barillas is a 54 y.o. female on day 1 of admission presenting with Acute renal failure, unspecified acute renal failure type (CMS-HCC).      Subjective   54 y.o. female presenting with With a past medical history of CKD stage V, anemia, NSTEMI, HLD, CVA, depression with suicidal ideation, T2DM who presents to [facility] [place] for evaluation of abdominal pain.  Patient reports abdominal pain began yesterday spontaneously.  She reports the pain felt like an aching, severity 10/10 at its worst, and was continuous until arriving to the ER receiving pain medication.  she reports associated nausea without vomiting and 2 episodes of diarrhea.  She report decreased appetite over the last couple weeks, she does report adequate fluid intake.  She reports dark stools.  She denies any chest pain, shortness of breath, she does reports intermittent dizziness, she denies headache.  She denies any fevers or chills.  She reports she has taken her blood pressure medication as prescribed other than today.     Of note patient recently diagnosed with the flu on 1/28.  Patient also recently on doxycycline for upper respiratory infection.     ED course: Afebrile, hypertensive at 207/97 otherwise hemodynamically stable.  Labs notable for glucose 149, potassium 5.8 (slightly hemolyzed), chloride 108, bicarb 16, , creatinine 15.19, hemoglobin 7.3 (1/20/2025 hemoglobin 10.4), hematocrit 21.8.  No leukocytosis.  UA pending.  CT abdomen pelvis revealing cholelithiasis, anasarca/hypoproteinemia, small pericardial effusion.  Morphine, Zofran, 1 L normal saline in ER.     10 point review of systems has been performed and is negative except what is stated above       Objective     Last Recorded Vitals  /61 (BP Location: Left arm, Patient Position: Lying)   Pulse 70   Temp 35.6 °C (96.1 °F) (Temporal)   Resp 18   Wt 79.4 kg (175 lb)   SpO2 99%   Intake/Output last 3 Shifts:    Intake/Output Summary (Last 24 hours) at 2/12/2025  1142  Last data filed at 2/11/2025 2100  Gross per 24 hour   Intake 240 ml   Output --   Net 240 ml       Admission Weight  Weight: 79.4 kg (175 lb) (02/11/25 0844)    Daily Weight  02/11/25 : 79.4 kg (175 lb)    Image Results  CT abdomen pelvis wo IV contrast  Narrative: Interpreted By:  Mary Medina,   STUDY:  CT ABDOMEN PELVIS WO IV CONTRAST;  2/11/2025 1:20 pm      INDICATION:  Signs/Symptoms:abd pain.      COMPARISON:  04/16/2024      ACCESSION NUMBER(S):  [id number]      ORDERING CLINICIAN:  MAR AREVALO      TECHNIQUE:  CT of the abdomen and pelvis was performed. No oral, no intravenous  contrast.      FINDINGS:  LOWER CHEST:  Images of the lung bases show no infiltrate or pleural fluid.  There is a small pericardial effusion, new      ABDOMEN:      LIVER:  There is a benign hepatic cyst.      BILE DUCTS:  There is no intrahepatic, common hepatic or common bile ductal  dilatation.      GALLBLADDER:  There are multiple layering calcified gallstones in a distended but  otherwise unremarkable gallbladder.      PANCREAS:  The pancreas is unremarkable.      SPLEEN:  The spleen is unremarkable. There is no splenic mass or splenomegaly.      ADRENAL GLANDS:  The adrenal glands are unremarkable.      KIDNEYS AND URETERS:  The kidneys demonstrate no mass.  There is no intrarenal calculus or  hydronephrosis.      BOWEL:  There is no bowel wall thickening, dilatation or obstruction.  The appendix is normal.      VESSELS:  There is marked atherosclerotic calcification of the vessels of the  abdomen and pelvis. There is atherosclerotic calcification of the  abdominal aorta common iliac and femoral arteries. There is  calcification of the renal arteries, mesenteric arteries and splenic  artery.      PERITONEUM/RETROPERITONEUM/LYMPH NODES:  There is no retroperitoneal or pelvic adenopathy.  There is no  ascites.      ABDOMINAL WALL:  There is reticulation of the subcutaneous fat encircling the abdomen  and pelvis  extending into the proximal thighs consistent with  anasarca/hypoproteinemia.      BONE AND SOFT TISSUE:  There is no acute osseous finding.      There is no soft tissue abnormality.  Status post hysterectomy.      Impression: 1. Cholelithiasis.  2. Anasarca/hypoproteinemia.  3. Status post hysterectomy.  4. Benign hepatic cyst.  5. Small pericardial effusion.      MACRO:  None      Signed by: Mary Medina 2/11/2025 1:25 PM  Dictation workstation:   [id number]    Results from last 7 days   Lab Units 02/12/25  0602   WBC AUTO x10*3/uL 5.8   HEMOGLOBIN g/dL 7.2*   HEMATOCRIT % 22.1*   PLATELETS AUTO x10*3/uL 294      Results from last 7 days   Lab Units 02/12/25  0602 02/11/25 1958 02/11/25  1051   SODIUM mmol/L 138   < > 136   POTASSIUM mmol/L 5.2   < > 5.8*   CHLORIDE mmol/L 110*   < > 108*   CO2 mmol/L 16*   < > 16*   BUN mg/dL 103*   < > 103*   CREATININE mg/dL 13.47*   < > 15.19*   CALCIUM mg/dL 7.5*   < > 7.9*   PROTEIN TOTAL g/dL  --   --  7.0   BILIRUBIN TOTAL mg/dL  --   --  0.6   ALK PHOS U/L  --   --  84   ALT U/L  --   --  8   AST U/L  --   --  44*   GLUCOSE mg/dL 94   < > 149*    < > = values in this interval not displayed.      In the review of systems nausea vomiting weakness lethargy fatigue and malaise.    Abdominal discomfort myalgias weakness    Physical Exam  Awake and alert and oriented x 3 lungs are diminished but clear heart has a regular rate and rhythm abdomen is soft is not distended or firm  Relevant Results               Assessment/Plan   This patient currently has cardiac telemetry ordered; if you would like to modify or discontinue the telemetry order, click here to go to the orders activity to modify/discontinue the order.              Assessment & Plan  Acute renal failure, unspecified acute renal failure type (CMS-HCC)      54 y.o. female presenting with With a past medical history of CKD stage V, anemia, NSTEMI, HLD, CVA, depression with suicidal ideation, T2DM who presents to  [facility] [place] for evaluation of abdominal pain.  Patient reports abdominal pain began yesterday spontaneously.  She reports the pain felt like an aching, severity 10/10 at its worst, and was continuous until arriving to the ER receiving pain medication.  she reports associated nausea without vomiting and 2 episodes of diarrhea.  She report decreased appetite over the last couple weeks, she does report adequate fluid intake.  She reports dark stools.  She denies any chest pain, shortness of breath, she does reports intermittent dizziness, she denies headache.  She denies any fevers or chills.  She reports she has taken her blood pressure medication as prescribed other than today.     #JOYCELYN on CKD  #Hyperkalemia  #Abdominal pain  #Nausea  #Diarrhea  #Pericardial effusion  #Acute on chronic anemia  #Hypertension  #Melena reported  #Anasarca  #Cholelithiasis  Admit to telemetry  Consult nephrology/cardiology/GI appreciate input  Repeat potassium pending  Zofran as needed  Anemia panel  Stool for occult blood  Clear liquid diet  1 L normal saline in ER, awaiting further recommendations per nephrology  Continue home amlodipine/Coreg  Hold home aspirin/Plavix 2/2 reported dark stool  IV Protonix twice daily  Add renal function panel     #T2DM  #CVA  #HLD  SSI  Hypoglycemic protocol  POC glucose  Continue home meds as appropriate     #DVT PPx  SCDs  Hold pharmacological AC 2/2 reported dark stools             Rai Puri,

## 2025-02-13 ENCOUNTER — APPOINTMENT (OUTPATIENT)
Dept: RADIOLOGY | Facility: HOSPITAL | Age: 55
DRG: 674 | End: 2025-02-13
Payer: MEDICARE

## 2025-02-13 ENCOUNTER — APPOINTMENT (OUTPATIENT)
Dept: CARDIOLOGY | Facility: CLINIC | Age: 55
End: 2025-02-13
Payer: MEDICARE

## 2025-02-13 LAB
ABO GROUP (TYPE) IN BLOOD: NORMAL
ABO GROUP (TYPE) IN BLOOD: NORMAL
ANION GAP SERPL CALC-SCNC: 17 MMOL/L (ref 10–20)
ANTIBODY SCREEN: NORMAL
APTT PPP: 29 SECONDS (ref 27–38)
BLOOD EXPIRATION DATE: NORMAL
BUN SERPL-MCNC: 99 MG/DL (ref 6–23)
CALCIUM SERPL-MCNC: 7.2 MG/DL (ref 8.6–10.3)
CHLORIDE SERPL-SCNC: 108 MMOL/L (ref 98–107)
CO2 SERPL-SCNC: 17 MMOL/L (ref 21–32)
CREAT SERPL-MCNC: 13.41 MG/DL (ref 0.5–1.05)
DISPENSE STATUS: NORMAL
EGFRCR SERPLBLD CKD-EPI 2021: 3 ML/MIN/1.73M*2
ERYTHROCYTE [DISTWIDTH] IN BLOOD BY AUTOMATED COUNT: 12.3 % (ref 11.5–14.5)
GLUCOSE BLD MANUAL STRIP-MCNC: 106 MG/DL (ref 74–99)
GLUCOSE BLD MANUAL STRIP-MCNC: 144 MG/DL (ref 74–99)
GLUCOSE BLD MANUAL STRIP-MCNC: 92 MG/DL (ref 74–99)
GLUCOSE SERPL-MCNC: 90 MG/DL (ref 74–99)
HCT VFR BLD AUTO: 20.6 % (ref 36–46)
HCT VFR BLD AUTO: 25.3 % (ref 36–46)
HGB BLD-MCNC: 6.7 G/DL (ref 12–16)
HGB BLD-MCNC: 8.5 G/DL (ref 12–16)
HOLD SPECIMEN: NORMAL
HOLD SPECIMEN: NORMAL
INR PPP: 1.1 (ref 0.9–1.1)
MCH RBC QN AUTO: 28.5 PG (ref 26–34)
MCHC RBC AUTO-ENTMCNC: 32.5 G/DL (ref 32–36)
MCV RBC AUTO: 88 FL (ref 80–100)
NRBC BLD-RTO: 0 /100 WBCS (ref 0–0)
PLATELET # BLD AUTO: 281 X10*3/UL (ref 150–450)
POTASSIUM SERPL-SCNC: 4.8 MMOL/L (ref 3.5–5.3)
PRODUCT BLOOD TYPE: 5100
PRODUCT CODE: NORMAL
PROTHROMBIN TIME: 11.9 SECONDS (ref 9.8–12.8)
RBC # BLD AUTO: 2.35 X10*6/UL (ref 4–5.2)
RH FACTOR (ANTIGEN D): NORMAL
RH FACTOR (ANTIGEN D): NORMAL
SODIUM SERPL-SCNC: 137 MMOL/L (ref 136–145)
UNIT ABO: NORMAL
UNIT NUMBER: NORMAL
UNIT RH: NORMAL
UNIT VOLUME: 282
URATE SERPL-MCNC: 9.6 MG/DL (ref 2.3–6.7)
WBC # BLD AUTO: 5.6 X10*3/UL (ref 4.4–11.3)
XM INTEP: NORMAL

## 2025-02-13 PROCEDURE — 02HV33Z INSERTION OF INFUSION DEVICE INTO SUPERIOR VENA CAVA, PERCUTANEOUS APPROACH: ICD-10-PCS | Performed by: SURGERY

## 2025-02-13 PROCEDURE — 36558 INSERT TUNNELED CV CATH: CPT | Performed by: SURGERY

## 2025-02-13 PROCEDURE — C1894 INTRO/SHEATH, NON-LASER: HCPCS | Performed by: SURGERY

## 2025-02-13 PROCEDURE — 78226 HEPATOBILIARY SYSTEM IMAGING: CPT | Performed by: STUDENT IN AN ORGANIZED HEALTH CARE EDUCATION/TRAINING PROGRAM

## 2025-02-13 PROCEDURE — 2720000007 HC OR 272 NO HCPCS: Performed by: SURGERY

## 2025-02-13 PROCEDURE — 82947 ASSAY GLUCOSE BLOOD QUANT: CPT

## 2025-02-13 PROCEDURE — 84550 ASSAY OF BLOOD/URIC ACID: CPT | Performed by: INTERNAL MEDICINE

## 2025-02-13 PROCEDURE — 85027 COMPLETE CBC AUTOMATED: CPT

## 2025-02-13 PROCEDURE — 7100000009 HC PHASE TWO TIME - INITIAL BASE CHARGE: Performed by: SURGERY

## 2025-02-13 PROCEDURE — 36430 TRANSFUSION BLD/BLD COMPNT: CPT

## 2025-02-13 PROCEDURE — 85014 HEMATOCRIT: CPT | Performed by: PHYSICIAN ASSISTANT

## 2025-02-13 PROCEDURE — 2500000001 HC RX 250 WO HCPCS SELF ADMINISTERED DRUGS (ALT 637 FOR MEDICARE OP): Performed by: NURSE PRACTITIONER

## 2025-02-13 PROCEDURE — 99232 SBSQ HOSP IP/OBS MODERATE 35: CPT | Performed by: NURSE PRACTITIONER

## 2025-02-13 PROCEDURE — 86850 RBC ANTIBODY SCREEN: CPT | Performed by: PHYSICIAN ASSISTANT

## 2025-02-13 PROCEDURE — 2500000004 HC RX 250 GENERAL PHARMACY W/ HCPCS (ALT 636 FOR OP/ED): Performed by: NURSE PRACTITIONER

## 2025-02-13 PROCEDURE — A9537 TC99M MEBROFENIN: HCPCS | Performed by: INTERNAL MEDICINE

## 2025-02-13 PROCEDURE — 2500000001 HC RX 250 WO HCPCS SELF ADMINISTERED DRUGS (ALT 637 FOR MEDICARE OP): Performed by: INTERNAL MEDICINE

## 2025-02-13 PROCEDURE — 2500000004 HC RX 250 GENERAL PHARMACY W/ HCPCS (ALT 636 FOR OP/ED): Performed by: INTERNAL MEDICINE

## 2025-02-13 PROCEDURE — 78226 HEPATOBILIARY SYSTEM IMAGING: CPT

## 2025-02-13 PROCEDURE — 2500000002 HC RX 250 W HCPCS SELF ADMINISTERED DRUGS (ALT 637 FOR MEDICARE OP, ALT 636 FOR OP/ED): Performed by: INTERNAL MEDICINE

## 2025-02-13 PROCEDURE — C1750 CATH, HEMODIALYSIS,LONG-TERM: HCPCS | Performed by: SURGERY

## 2025-02-13 PROCEDURE — 3430000001 HC RX 343 DIAGNOSTIC RADIOPHARMACEUTICALS: Performed by: INTERNAL MEDICINE

## 2025-02-13 PROCEDURE — 2500000001 HC RX 250 WO HCPCS SELF ADMINISTERED DRUGS (ALT 637 FOR MEDICARE OP)

## 2025-02-13 PROCEDURE — 0JH63XZ INSERTION OF TUNNELED VASCULAR ACCESS DEVICE INTO CHEST SUBCUTANEOUS TISSUE AND FASCIA, PERCUTANEOUS APPROACH: ICD-10-PCS | Performed by: SURGERY

## 2025-02-13 PROCEDURE — 80048 BASIC METABOLIC PNL TOTAL CA: CPT

## 2025-02-13 PROCEDURE — 36415 COLL VENOUS BLD VENIPUNCTURE: CPT | Performed by: PHYSICIAN ASSISTANT

## 2025-02-13 PROCEDURE — 86923 COMPATIBILITY TEST ELECTRIC: CPT

## 2025-02-13 PROCEDURE — 7100000010 HC PHASE TWO TIME - EACH INCREMENTAL 1 MINUTE: Performed by: SURGERY

## 2025-02-13 PROCEDURE — 2060000001 HC INTERMEDIATE ICU ROOM DAILY

## 2025-02-13 PROCEDURE — P9016 RBC LEUKOCYTES REDUCED: HCPCS

## 2025-02-13 PROCEDURE — 85610 PROTHROMBIN TIME: CPT | Performed by: PHYSICIAN ASSISTANT

## 2025-02-13 PROCEDURE — 2500000004 HC RX 250 GENERAL PHARMACY W/ HCPCS (ALT 636 FOR OP/ED): Performed by: SURGERY

## 2025-02-13 PROCEDURE — 36415 COLL VENOUS BLD VENIPUNCTURE: CPT

## 2025-02-13 RX ORDER — KIT FOR THE PREPARATION OF TECHNETIUM TC 99M MEBROFENIN 45 MG/10ML
4.1 INJECTION, POWDER, LYOPHILIZED, FOR SOLUTION INTRAVENOUS
Status: COMPLETED | OUTPATIENT
Start: 2025-02-13 | End: 2025-02-13

## 2025-02-13 RX ORDER — LIDOCAINE HYDROCHLORIDE 20 MG/ML
INJECTION, SOLUTION INFILTRATION; PERINEURAL AS NEEDED
Status: DISCONTINUED | OUTPATIENT
Start: 2025-02-13 | End: 2025-02-13 | Stop reason: HOSPADM

## 2025-02-13 RX ORDER — SUCRALFATE 1 G/1
1 TABLET ORAL
Status: DISCONTINUED | OUTPATIENT
Start: 2025-02-13 | End: 2025-02-19 | Stop reason: HOSPADM

## 2025-02-13 RX ADMIN — PIPERACILLIN SODIUM AND TAZOBACTAM SODIUM 2.25 G: 2; .25 INJECTION, SOLUTION INTRAVENOUS at 04:10

## 2025-02-13 RX ADMIN — PANTOPRAZOLE SODIUM 40 MG: 40 INJECTION, POWDER, FOR SOLUTION INTRAVENOUS at 08:24

## 2025-02-13 RX ADMIN — POLYETHYLENE GLYCOL 3350 17 G: 17 POWDER, FOR SOLUTION ORAL at 08:24

## 2025-02-13 RX ADMIN — URSODIOL 300 MG: 300 CAPSULE ORAL at 21:25

## 2025-02-13 RX ADMIN — PIPERACILLIN SODIUM AND TAZOBACTAM SODIUM 2.25 G: 2; .25 INJECTION, SOLUTION INTRAVENOUS at 11:13

## 2025-02-13 RX ADMIN — AMLODIPINE BESYLATE 10 MG: 10 TABLET ORAL at 08:24

## 2025-02-13 RX ADMIN — ACETAMINOPHEN 650 MG: 325 TABLET, FILM COATED ORAL at 00:11

## 2025-02-13 RX ADMIN — CARVEDILOL 12.5 MG: 12.5 TABLET, FILM COATED ORAL at 08:24

## 2025-02-13 RX ADMIN — SUCRALFATE 1 G: 1 TABLET ORAL at 21:24

## 2025-02-13 RX ADMIN — KIT FOR THE PREPARATION OF TECHNETIUM TC 99M MEBROFENIN 4.1 MILLICURIE: 45 INJECTION, POWDER, LYOPHILIZED, FOR SOLUTION INTRAVENOUS at 13:28

## 2025-02-13 RX ADMIN — CARVEDILOL 12.5 MG: 12.5 TABLET, FILM COATED ORAL at 21:24

## 2025-02-13 RX ADMIN — PIPERACILLIN SODIUM AND TAZOBACTAM SODIUM 2.25 G: 2; .25 INJECTION, SOLUTION INTRAVENOUS at 21:39

## 2025-02-13 RX ADMIN — ACETAMINOPHEN 650 MG: 325 TABLET, FILM COATED ORAL at 04:13

## 2025-02-13 RX ADMIN — PANTOPRAZOLE SODIUM 40 MG: 40 INJECTION, POWDER, FOR SOLUTION INTRAVENOUS at 21:39

## 2025-02-13 ASSESSMENT — COGNITIVE AND FUNCTIONAL STATUS - GENERAL: MOBILITY SCORE: 24

## 2025-02-13 ASSESSMENT — PAIN SCALES - GENERAL
PAINLEVEL_OUTOF10: 0 - NO PAIN
PAINLEVEL_OUTOF10: 3
PAINLEVEL_OUTOF10: 1
PAINLEVEL_OUTOF10: 1
PAINLEVEL_OUTOF10: 0 - NO PAIN
PAINLEVEL_OUTOF10: 5 - MODERATE PAIN
PAINLEVEL_OUTOF10: 0 - NO PAIN

## 2025-02-13 ASSESSMENT — PAIN DESCRIPTION - DESCRIPTORS
DESCRIPTORS: ACHING
DESCRIPTORS: ACHING

## 2025-02-13 ASSESSMENT — PAIN - FUNCTIONAL ASSESSMENT
PAIN_FUNCTIONAL_ASSESSMENT: 0-10

## 2025-02-13 NOTE — PROGRESS NOTES
Beth spoke with pt's brother regarding which dialysis clinic pt would want to go to once DC.  Pt's brother states that they would like [facility] in [place].  Sw sent the referral as requested.  Beth notified pt's brother that they do have limited chair times if any at all at this location.  Sw to keep pt and brother updated once we hear back from [facility] on availability.

## 2025-02-13 NOTE — CARE PLAN
The patient's goals for the shift include      The clinical goals for the shift include remain HDS      Problem: Pain - Adult  Goal: Verbalizes/displays adequate comfort level or baseline comfort level  Outcome: Progressing

## 2025-02-13 NOTE — NURSING NOTE
[facility]  [facility]. Transfer report called to Collette RN.  Patient to be transferred back to floor via bed in stable condition.

## 2025-02-13 NOTE — INTERVAL H&P NOTE
H&P reviewed. The patient was examined and there are no changes to the H&P.  Chronic kidney disease.  Needs permacath to start hemodialysis

## 2025-02-13 NOTE — CARE PLAN
The patient's goals for the shift include      The clinical goals for the shift include remain HDS    Problem: Pain - Adult  Goal: Verbalizes/displays adequate comfort level or baseline comfort level  2/13/2025 1443 by Collette Smith, RN  Outcome: Progressing  2/13/2025 1442 by Collette Smith, RN  Outcome: Progressing

## 2025-02-13 NOTE — PROGRESS NOTES
Yola Barillas is a 54 y.o. female on day 2 of admission presenting with Acute renal failure, unspecified acute renal failure type (CMS-HCC).    Subjective   2/12/2025 reports feeling much better today, lower/suprapubic abdominal pain still there but significantly improved.  Denies any overt bleeding.  No reports of such from bedside staff.  H&H stable.  Abdomen soft, nontender, bowel sounds present.  No stool today.  Tolerates CLD well   2/13/2025 recalled to bedside by house NP following H&H dropped to 6.7.  Denies any GI complaints.  Still mild suprapubic pain but significantly better.  Denies any stool or any overt GI bleed but reports significant amount of blood in her urine.  Goes for tunneled cath placement later today at the moment of exam.  Abdomen soft, nontender, bowel sounds present.  No stool today    Objective     A 10 point review of system is negative except for what is mentioned in the HPI     Physical Exam     The note was created using voice recognition transcription software. Despite proofreading, unintentional typographical errors may be present. Please contact the GI office with any questions or concerns.      Current Medications: reviewed     Vital Signs: Reviewed     Physical Exam:  General: no apparent distress, pleasant and cooperative.  Mildly obese  Skin:  Warm and dry, no jaundice  HEENT: No scleral icterus, no conjunctival pallor, normocephalic, atraumatic, mucous membranes moist  Neck:  atraumatic, trachea midline, no JVD  Chest:  decreased air entry to auscultation bilaterally. No wheezes, rales, or rhonchi  CV:  Regular rate and rhythm.  Positive S1/S2  Abdomen: no distension, +BS, soft, mildly tender to palpation in epigastrium and suprapubic area, no rebound tenderness, no guarding, no rigidity, no discernible ascites.    Extremities: no lower extremity edema, Chronic pigmentary changes, no cyanosis  Neurological:  A&Ox3 , no asterixis  Psychiatric: cooperative     "  Investigations:  Labs, radiological imaging and cardiac work up were reviewed    Last Recorded Vitals  Blood pressure 170/80, pulse 75, temperature 35.9 °C (96.6 °F), temperature source Temporal, resp. rate 18, height 1.6 m (5' 3\"), weight 79.4 kg (175 lb), SpO2 97%.  Intake/Output last 3 Shifts:  I/O last 3 completed shifts:  In: 580 (7.3 mL/kg) [P.O.:480; IV Piggyback:100]  Out: 150 (1.9 mL/kg) [Urine:150 (0.1 mL/kg/hr)]  Weight: 79.4 kg     Relevant Results        Scheduled medications  amLODIPine, 10 mg, oral, Daily  carvedilol, 12.5 mg, oral, BID  [START ON 2/16/2025] epoetin mattie or biosimilar, 20,000 Units, subcutaneous, Every Sunday  heparin, 2,200 Units, intra-catheter, After Dialysis  heparin, 2,400 Units, intra-catheter, After Dialysis  insulin lispro, 0-10 Units, subcutaneous, TID AC  pantoprazole, 40 mg, intravenous, BID  piperacillin-tazobactam, 2.25 g, intravenous, q8h  polyethylene glycol, 17 g, oral, Daily  sucralfate, 1 g, oral, Before meals & nightly  ursodiol, 300 mg, oral, Nightly      Continuous medications     PRN medications  PRN medications: acetaminophen **OR** acetaminophen **OR** acetaminophen, dextrose, dextrose, glucagon, glucagon    Results for orders placed or performed during the hospital encounter of 02/11/25 (from the past 24 hours)   POCT GLUCOSE   Result Value Ref Range    POCT Glucose 132 (H) 74 - 99 mg/dL   POCT GLUCOSE   Result Value Ref Range    POCT Glucose 92 74 - 99 mg/dL   Uric Acid   Result Value Ref Range    Uric Acid 9.6 (H) 2.3 - 6.7 mg/dL   CBC   Result Value Ref Range    WBC 5.6 4.4 - 11.3 x10*3/uL    nRBC 0.0 0.0 - 0.0 /100 WBCs    RBC 2.35 (L) 4.00 - 5.20 x10*6/uL    Hemoglobin 6.7 (L) 12.0 - 16.0 g/dL    Hematocrit 20.6 (L) 36.0 - 46.0 %    MCV 88 80 - 100 fL    MCH 28.5 26.0 - 34.0 pg    MCHC 32.5 32.0 - 36.0 g/dL    RDW 12.3 11.5 - 14.5 %    Platelets 281 150 - 450 x10*3/uL   Basic Metabolic Panel   Result Value Ref Range    Glucose 90 74 - 99 mg/dL    Sodium " 137 136 - 145 mmol/L    Potassium 4.8 3.5 - 5.3 mmol/L    Chloride 108 (H) 98 - 107 mmol/L    Bicarbonate 17 (L) 21 - 32 mmol/L    Anion Gap 17 10 - 20 mmol/L    Urea Nitrogen 99 (HH) 6 - 23 mg/dL    Creatinine 13.41 (H) 0.50 - 1.05 mg/dL    eGFR 3 (L) >60 mL/min/1.73m*2    Calcium 7.2 (L) 8.6 - 10.3 mg/dL   Prepare RBC: 1 Units   Result Value Ref Range    PRODUCT CODE T2054P99     Unit Number [id number]     Unit ABO O     Unit RH POS     XM INTEP COMP     Dispense Status TR     Blood Expiration Date 3/6/2025 11:59:00 PM EST     PRODUCT BLOOD TYPE 5100     UNIT VOLUME 282    Coagulation Screen   Result Value Ref Range    Protime 11.9 9.8 - 12.8 seconds    INR 1.1 0.9 - 1.1    aPTT 29 27 - 38 seconds   Type and screen   Result Value Ref Range    ABO TYPE O     Rh TYPE POS     ANTIBODY SCREEN NEG    SST TOP   Result Value Ref Range    Extra Tube Hold for add-ons.    VERIFY ABO/Rh Group Test   Result Value Ref Range    ABO TYPE O     Rh TYPE POS    POCT GLUCOSE   Result Value Ref Range    POCT Glucose 106 (H) 74 - 99 mg/dL   Hemoglobin and hematocrit, blood   Result Value Ref Range    Hemoglobin 8.5 (L) 12.0 - 16.0 g/dL    Hematocrit 25.3 (L) 36.0 - 46.0 %   POCT GLUCOSE   Result Value Ref Range    POCT Glucose 144 (H) 74 - 99 mg/dL         No specimens collected during this procedure.  Last relevant procedure:        This patient currently has cardiac telemetry ordered; if you would like to modify or discontinue the telemetry order, click here to go to the orders activity to modify/discontinue the order.      Assessment/Plan   Assessment & Plan  Acute renal failure, unspecified acute renal failure type (CMS-HCC)    Yola Barillas is a 54 y.o. female with PMH of CKD 5, anemia, NSTEMI, HLD, CVA, depression with suicidal ideation, T2DM who presented to [facility] ER 2/11/2025 for evaluation of abdominal pain.    she reports associated nausea without vomiting and 2 episodes of dark diarrhea.       #Abdominal pain,  lower abdomen/suprapubic,  improved  #Anemia  #Dark stools, reported  #Constipation  In a setting of JOYCELYN on CKD, pericardial effusion on imaging     KYLE at bedside no overt signs of bleeding.  H&H down to 6.7 today but continues to deny any overt GI bleed.  Reports blood in urine  Iron studies inconsistent with STACY  No immediate/emergent need in endoscopic studies at this time  Continue daily CBC, active type and screen on file, 2 large-bore IV access, replenish as appropriate, watch stool for overt blood  Continue Miralax 17 gr PO daily  Medical, supportive care as per primary medicine  Cardiology, nephrology on team    If no inpatient endoscopic studies, will need outpatient follow-up for anemia investigation and screening colonoscopy as patient never had one.  Request sent to GI      Patient discussed with Dr. Jael JOYCE to sign off at this time.  Please do not hesitate to reconsult/reach out with questions should any arise       I spent 30 minutes in the professional and overall care of this patient.      Nicki Zhao, APRN-CNP

## 2025-02-13 NOTE — NURSING NOTE
The hemodialysis orders has been rescheduled to  tomorrow 2/14/25 per Dr. Bryant due to late procedures.

## 2025-02-13 NOTE — CARE PLAN
The patient's goals for the shift include      The clinical goals for the shift include rest and comfort      Problem: Pain - Adult  Goal: Verbalizes/displays adequate comfort level or baseline comfort level  Outcome: Progressing     Problem: Safety - Adult  Goal: Free from fall injury  Outcome: Progressing     Problem: Discharge Planning  Goal: Discharge to home or other facility with appropriate resources  Outcome: Progressing     Problem: Chronic Conditions and Co-morbidities  Goal: Patient's chronic conditions and co-morbidity symptoms are monitored and maintained or improved  Outcome: Progressing     Problem: Nutrition  Goal: Nutrient intake appropriate for maintaining nutritional needs  Outcome: Progressing       Over the shift, the patient did not make progress toward the following goals. Barriers to progression include

## 2025-02-13 NOTE — PROGRESS NOTES
Subjective   Patient still complaining of severe abdominal pain both lower quadrants and right upper quadrant,  Awaiting dialysis catheter placement  Patient urinalysis showing more than 100,000 units of Enterococcus faecalis.  Urinalysis sediment showed pyuria   Uric acid is elevated at 9.6 ;  Patient PTH is elevated 404.2  Patient received 1 unit of packed cells due to hemoglobin dropping below 7    Objective          Vitals 24HR  Heart Rate:  [70-86]   Temp:  [36 °C (96.8 °F)-36.8 °C (98.2 °F)]   Resp:  [17-19]   BP: (130-177)/(71-81)   SpO2:  [95 %-100 %]         Intake/Output last 3 Shifts:    Intake/Output Summary (Last 24 hours) at 2/13/2025 1212  Last data filed at 2/12/2025 2345  Gross per 24 hour   Intake 340 ml   Output 150 ml   Net 190 ml       Physical Exam    General appearance; alert pleasant  HEENT; pupils react to light and accommodation  Neck; no JVD no bruit no thyromegaly  No cervical adenopathy  Lungs; clear to auscultation and percussion  Heart; regular rate ;no rubs; no thrills   Abdomen; positive Hernandez's point  Active peristalsis. Tender Both Lower Quadrants  Extremities; 2+ edema  Neurological; no tremors no asterixis       Relevant Results  Results for orders placed or performed during the hospital encounter of 02/11/25 (from the past 24 hours)   POCT GLUCOSE   Result Value Ref Range    POCT Glucose 160 (H) 74 - 99 mg/dL   POCT GLUCOSE   Result Value Ref Range    POCT Glucose 132 (H) 74 - 99 mg/dL   POCT GLUCOSE   Result Value Ref Range    POCT Glucose 92 74 - 99 mg/dL   Uric Acid   Result Value Ref Range    Uric Acid 9.6 (H) 2.3 - 6.7 mg/dL   CBC   Result Value Ref Range    WBC 5.6 4.4 - 11.3 x10*3/uL    nRBC 0.0 0.0 - 0.0 /100 WBCs    RBC 2.35 (L) 4.00 - 5.20 x10*6/uL    Hemoglobin 6.7 (L) 12.0 - 16.0 g/dL    Hematocrit 20.6 (L) 36.0 - 46.0 %    MCV 88 80 - 100 fL    MCH 28.5 26.0 - 34.0 pg    MCHC 32.5 32.0 - 36.0 g/dL    RDW 12.3 11.5 - 14.5 %    Platelets 281 150 - 450 x10*3/uL    Basic Metabolic Panel   Result Value Ref Range    Glucose 90 74 - 99 mg/dL    Sodium 137 136 - 145 mmol/L    Potassium 4.8 3.5 - 5.3 mmol/L    Chloride 108 (H) 98 - 107 mmol/L    Bicarbonate 17 (L) 21 - 32 mmol/L    Anion Gap 17 10 - 20 mmol/L    Urea Nitrogen 99 (HH) 6 - 23 mg/dL    Creatinine 13.41 (H) 0.50 - 1.05 mg/dL    eGFR 3 (L) >60 mL/min/1.73m*2    Calcium 7.2 (L) 8.6 - 10.3 mg/dL   Prepare RBC: 1 Units   Result Value Ref Range    PRODUCT CODE A5912Y96     Unit Number [id number]     Unit ABO O     Unit RH POS     XM INTEP COMP     Dispense Status XM     Blood Expiration Date 3/6/2025 11:59:00 PM EST     PRODUCT BLOOD TYPE 5100     UNIT VOLUME 282    Coagulation Screen   Result Value Ref Range    Protime 11.9 9.8 - 12.8 seconds    INR 1.1 0.9 - 1.1    aPTT 29 27 - 38 seconds   Type and screen   Result Value Ref Range    ABO TYPE O     Rh TYPE POS     ANTIBODY SCREEN NEG    SST TOP   Result Value Ref Range    Extra Tube Hold for add-ons.    VERIFY ABO/Rh Group Test   Result Value Ref Range    ABO TYPE O     Rh TYPE POS      CT abdomen pelvis wo IV contrast    Result Date: 2/11/2025  Interpreted By:  Mary Medina, STUDY: CT ABDOMEN PELVIS WO IV CONTRAST;  2/11/2025 1:20 pm   INDICATION: Signs/Symptoms:abd pain.   COMPARISON: 04/16/2024   ACCESSION NUMBER(S): [id number]   ORDERING CLINICIAN: MAR AREVALO   TECHNIQUE: CT of the abdomen and pelvis was performed. No oral, no intravenous contrast.   FINDINGS: LOWER CHEST: Images of the lung bases show no infiltrate or pleural fluid. There is a small pericardial effusion, new   ABDOMEN:   LIVER: There is a benign hepatic cyst.   BILE DUCTS: There is no intrahepatic, common hepatic or common bile ductal dilatation.   GALLBLADDER: There are multiple layering calcified gallstones in a distended but otherwise unremarkable gallbladder.   PANCREAS: The pancreas is unremarkable.   SPLEEN: The spleen is unremarkable. There is no splenic mass or splenomegaly.    ADRENAL GLANDS: The adrenal glands are unremarkable.   KIDNEYS AND URETERS: The kidneys demonstrate no mass.  There is no intrarenal calculus or hydronephrosis.   BOWEL: There is no bowel wall thickening, dilatation or obstruction. The appendix is normal.   VESSELS: There is marked atherosclerotic calcification of the vessels of the abdomen and pelvis. There is atherosclerotic calcification of the abdominal aorta common iliac and femoral arteries. There is calcification of the renal arteries, mesenteric arteries and splenic artery.   PERITONEUM/RETROPERITONEUM/LYMPH NODES: There is no retroperitoneal or pelvic adenopathy.  There is no ascites.   ABDOMINAL WALL: There is reticulation of the subcutaneous fat encircling the abdomen and pelvis extending into the proximal thighs consistent with anasarca/hypoproteinemia.   BONE AND SOFT TISSUE: There is no acute osseous finding.   There is no soft tissue abnormality. Status post hysterectomy.       1. Cholelithiasis. 2. Anasarca/hypoproteinemia. 3. Status post hysterectomy. 4. Benign hepatic cyst. 5. Small pericardial effusion.   MACRO: None   Signed by: Mary Medina 2/11/2025 1:25 PM Dictation workstation:   [id number]    ECG 12 lead    Result Date: 1/29/2025  Sinus tachycardia Probable left atrial enlargement Left anterior fascicular block    XR chest 2 views    Result Date: 1/28/2025  STUDY: Chest Radiographs;  1/28/2025 at 7:05 PM. INDICATION: Cough, flu. COMPARISON: None available. ACCESSION NUMBER(S): [id number] ORDERING CLINICIAN: NATACHA REYES TECHNIQUE:  Frontal and lateral chest. FINDINGS: CARDIOMEDIASTINAL SILHOUETTE: Cardiomediastinal silhouette is normal in size and configuration.  LUNGS: Lungs are clear.  ABDOMEN: No remarkable upper abdominal findings.  BONES: No acute osseous changes.  Moderate scoliosis present.    No focal infiltrate identified. Signed by Niko Marcos MD    Transthoracic Echo (TTE) Complete    Result Date: 1/22/2025   [place]  [facility], [address] [place] [address]           Tel [phone] and Fax [phone] TRANSTHORACIC ECHOCARDIOGRAM REPORT  Patient Name:       REHAN DOCTOR    Reading Physician:    Freddy Ma MD Study Date:         1/22/2025           Ordering Provider:    Freddy MA MRN/PID:            [id number]            Fellow: Accession#:         [id number]        Nurse: Date of Birth/Age:  1970 / 54 years Sonographer:          Amee Murphy                                                               [facility] Gender assigned at  F                   Additional Staff: Birth: Height:             162.56 cm           Admit Date: Weight:             79.38 kg            Admission Status: BSA / BMI:          1.85 m2 / 30.04     Encounter#:           [id number]                     kg/m2 Blood Pressure:     142/82 mmHg         [place]  [facility] [place] Type:    TRANSTHORACIC ECHO (TTE) COMPLETE Diagnosis/ICD: Atherosclerotic heart disease of native coronary artery without                angina pectoris-I25.10; Old myocardial infarction-I25.2 Indication:    AS[facility] CPT Code:      Echo Complete w Full Doppler-62133  Study Detail: The following Echo studies were performed: 2D, M-Mode, Doppler and               color flow.  PHYSICIAN INTERPRETATION: Left Ventricle: Left ventricular ejection fraction is normal, by visual estimate at 65-70%. There is moderate concentric left ventricular hypertrophy. There are no regional left ventricular wall motion abnormalities. The left ventricular cavity size is normal. There is moderately increased septal and moderately increased posterior left ventricular wall thickness. Spectral Doppler shows a Grade I (impaired relaxation pattern) of left ventricular diastolic filling with normal left atrial filling pressure. Left Atrium:  The left atrium is normal in size. Right Ventricle: The right ventricle is normal in size. There is normal right ventricular global systolic function. Right Atrium: The right atrium is normal in size. Aortic Valve: The aortic valve is trileaflet. There is moderate aortic valve thickening. There is evidence of mild aortic valve stenosis. The aortic valve dimensionless index is 0.62. There is no evidence of aortic valve regurgitation. The peak instantaneous gradient of the aortic valve is 20 mmHg. The mean gradient of the aortic valve is 10 mmHg. Mitral Valve: The mitral valve is moderately thickened with a myxomatous appearance. There is mitral valve prolapse. There is mild late systolic mitral valve prolapse of the anterior leaflet. The peak instantaneous gradient of the mitral valve is 8 mmHg. There is mild to moderate mitral valve regurgitation which is laterally directed. Tricuspid Valve: The tricuspid valve is structurally normal. There is trace to mild tricuspid regurgitation. Pulmonic Valve: The pulmonic valve is structurally normal. There is trace to mild pulmonic valve regurgitation. Pericardium: There is no pericardial effusion noted. Aorta: The aortic root is normal. There is no dilatation of the aortic arch. There is no dilatation of the ascending aorta. There is no dilatation of the aortic root. Systemic Veins: The inferior vena cava appears normal in size.  CONCLUSIONS:  1. Left ventricular ejection fraction is normal, by visual estimate at 65-70%.  2. Spectral Doppler shows a Grade I (impaired relaxation pattern) of left ventricular diastolic filling with normal left atrial filling pressure.  3. There is moderately increased septal and moderately increased posterior left ventricular wall thickness.  4. There is moderate concentric left ventricular hypertrophy.  5. There is normal right ventricular global systolic function.  6. The mitral valve is moderately thickened with a myxomatous appearance.  7.  Mild to moderate mitral valve regurgitation.  8. Mild aortic valve stenosis. QUANTITATIVE DATA SUMMARY:  2D MEASUREMENTS:          Normal Ranges: LAs:             3.48 cm  (2.7-4.0cm) RVIDd:           2.36 cm  (0.9-3.6cm) IVSd:            1.52 cm  (0.6-1.1cm) LVPWd:           1.50 cm  (0.6-1.1cm) LVIDd:           4.47 cm  (3.9-5.9cm) LVIDs:           3.04 cm LV Mass Index:   149 g/m2 LVEDV Index:     53 ml/m2 LV % FS          32.0 %  LA VOLUME:                    Normal Ranges: LA Vol A4C:        75.6 ml    (22+/-6mL/m2) LA Vol A2C:        96.6 ml LA Vol BP:         89.2 ml LA Vol Index A4C:  40.9 ml/m2 LA Vol Index A2C:  52.3 ml/m2 LA Vol Index BP:   48.3 ml/m2 LA Area A4C:       23.1 cm2 LA Area A2C:       25.0 cm2 LA Major Axis A4C: 6.0 cm LA Major Axis A2C: 5.5 cm LA Vol A4C:        74.4 ml LA Vol A2C:        87.9 ml LA Vol Index BSA:  43.9 ml/m2  RA VOLUME BY A/L METHOD:            Normal Ranges: RA Vol A4C:              34.7 ml    (8.3-19.5ml) RA Vol Index A4C:        18.8 ml/m2 RA Area A4C:             13.7 cm2 RA Major Axis A4C:       4.6 cm  AORTA MEASUREMENTS:         Normal Ranges: Ao STJ, d:          2.70 cm (1.7-3.4cm) Asc Ao, d:          3.30 cm (2.1-3.4cm)  LV SYSTOLIC FUNCTION BY 2D PLANIMETRY (MOD):                      Normal Ranges: EF-A4C View:    60 % (>=55%) EF-A2C View:    65 % EF-Biplane:     67 % EF-Visual:      68 % LV EF Reported: 68 %  LV DIASTOLIC FUNCTION:           Normal Ranges: MV Peak E:             1.15 m/s  (0.7-1.2 m/s) MV Peak A:             1.37 m/s  (0.42-0.7 m/s) E/A Ratio:             0.84      (1.0-2.2) MV e'                  0.008 m/s (>8.0) MV lateral e'          0.01 m/s MV medial e'           0.01 m/s E/e' Ratio:            153.16    (<8.0)  MITRAL VALVE:          Normal Ranges: MV Vmax:      1.42 m/s (<=1.3m/s) MV peak P.1 mmHg (<5mmHg) MV mean PG:   3.6 mmHg (<2mmHg) MV VTI:       33.40 cm (10-13cm) MV DT:        167 msec (150-240msec)  AORTIC VALVE:                       Normal Ranges: AoV Vmax:                2.26 m/s  (<=1.7m/s) AoV Peak P.4 mmHg (<20mmHg) AoV Mean PG:             10.0 mmHg (1.7-11.5mmHg) LVOT Max Romeo:            1.45 m/s  (<=1.1m/s) AoV VTI:                 47.24 cm  (18-25cm) LVOT VTI:                29.45 cm LVOT Diameter:           2.04 cm   (1.8-2.4cm) AoV Area, VTI:           2.04 cm2  (2.5-5.5cm2) AoV Area,Vmax:           2.10 cm2  (2.5-4.5cm2) AoV Dimensionless Index: 0.62  RIGHT VENTRICLE: RV Basal 3.00 cm RV Mid   2.50 cm RV Major 6.4 cm TAPSE:   23.0 mm RV s'    0.16 m/s  TRICUSPID VALVE/RVSP:          Normal Ranges: Peak TR Velocity:     2.50 m/s RV Syst Pressure:     28 mmHg  (< 30mmHg)  AORTA: Asc Ao Diam 3.29 cm  [id number] Marcos Ma MD Electronically signed on 2025 at 12:54:55 PM  ** Final **         Assessment/Plan   Acute kidney injury superimposed on chronic kidney disease stage V  Pyocystitis  Enterococcus faecalis infection  History of right ureteral stenosis  Cholecystitis without cholangitis  Anemia of chronic disease and renal failure  Abdominal pain  Anasarca  Hyperuricemia  Secondary hyperparathyroidism of renal origin plan  Plan  Awaiting placement of dialysis catheter  Dialysis  IV [facility]   Carafate  HIDA scan continue current antibiotics        Assessment & Plan  Acute renal failure, unspecified acute renal failure type (CMS-HCC)           I spent 30  minutes in the professional and overall care of this patient.      Chinedu Bryant MD

## 2025-02-14 ENCOUNTER — APPOINTMENT (OUTPATIENT)
Dept: DIALYSIS | Facility: HOSPITAL | Age: 55
End: 2025-02-14
Payer: MEDICARE

## 2025-02-14 LAB
ALBUMIN SERPL BCP-MCNC: 2.4 G/DL (ref 3.4–5)
ALP SERPL-CCNC: 56 U/L (ref 33–110)
ALT SERPL W P-5'-P-CCNC: 7 U/L (ref 7–45)
ANION GAP SERPL CALC-SCNC: 16 MMOL/L (ref 10–20)
AST SERPL W P-5'-P-CCNC: 33 U/L (ref 9–39)
BACTERIA UR CULT: ABNORMAL
BILIRUB SERPL-MCNC: 0.5 MG/DL (ref 0–1.2)
BUN SERPL-MCNC: 103 MG/DL (ref 6–23)
CALCIUM SERPL-MCNC: 7 MG/DL (ref 8.6–10.3)
CHLORIDE SERPL-SCNC: 109 MMOL/L (ref 98–107)
CO2 SERPL-SCNC: 17 MMOL/L (ref 21–32)
CREAT SERPL-MCNC: 13.72 MG/DL (ref 0.5–1.05)
EGFRCR SERPLBLD CKD-EPI 2021: 3 ML/MIN/1.73M*2
ERYTHROCYTE [DISTWIDTH] IN BLOOD BY AUTOMATED COUNT: 12.4 % (ref 11.5–14.5)
GLUCOSE BLD MANUAL STRIP-MCNC: 133 MG/DL (ref 74–99)
GLUCOSE BLD MANUAL STRIP-MCNC: 151 MG/DL (ref 74–99)
GLUCOSE BLD MANUAL STRIP-MCNC: 86 MG/DL (ref 74–99)
GLUCOSE SERPL-MCNC: 86 MG/DL (ref 74–99)
HCT VFR BLD AUTO: 23.8 % (ref 36–46)
HGB BLD-MCNC: 7.9 G/DL (ref 12–16)
MCH RBC QN AUTO: 28.7 PG (ref 26–34)
MCHC RBC AUTO-ENTMCNC: 33.2 G/DL (ref 32–36)
MCV RBC AUTO: 87 FL (ref 80–100)
NRBC BLD-RTO: 0 /100 WBCS (ref 0–0)
PLATELET # BLD AUTO: 252 X10*3/UL (ref 150–450)
POTASSIUM SERPL-SCNC: 5.4 MMOL/L (ref 3.5–5.3)
PROT SERPL-MCNC: 5.5 G/DL (ref 6.4–8.2)
RBC # BLD AUTO: 2.75 X10*6/UL (ref 4–5.2)
SODIUM SERPL-SCNC: 137 MMOL/L (ref 136–145)
WBC # BLD AUTO: 8.1 X10*3/UL (ref 4.4–11.3)

## 2025-02-14 PROCEDURE — 2500000001 HC RX 250 WO HCPCS SELF ADMINISTERED DRUGS (ALT 637 FOR MEDICARE OP): Performed by: NURSE PRACTITIONER

## 2025-02-14 PROCEDURE — 85027 COMPLETE CBC AUTOMATED: CPT | Performed by: PHYSICIAN ASSISTANT

## 2025-02-14 PROCEDURE — 5A1D70Z PERFORMANCE OF URINARY FILTRATION, INTERMITTENT, LESS THAN 6 HOURS PER DAY: ICD-10-PCS | Performed by: INTERNAL MEDICINE

## 2025-02-14 PROCEDURE — 2500000001 HC RX 250 WO HCPCS SELF ADMINISTERED DRUGS (ALT 637 FOR MEDICARE OP)

## 2025-02-14 PROCEDURE — 2500000004 HC RX 250 GENERAL PHARMACY W/ HCPCS (ALT 636 FOR OP/ED): Performed by: INTERNAL MEDICINE

## 2025-02-14 PROCEDURE — 82947 ASSAY GLUCOSE BLOOD QUANT: CPT

## 2025-02-14 PROCEDURE — 36415 COLL VENOUS BLD VENIPUNCTURE: CPT | Performed by: INTERNAL MEDICINE

## 2025-02-14 PROCEDURE — 8010000001 HC DIALYSIS - HEMODIALYSIS PER DAY

## 2025-02-14 PROCEDURE — 2500000002 HC RX 250 W HCPCS SELF ADMINISTERED DRUGS (ALT 637 FOR MEDICARE OP, ALT 636 FOR OP/ED): Performed by: INTERNAL MEDICINE

## 2025-02-14 PROCEDURE — 2500000001 HC RX 250 WO HCPCS SELF ADMINISTERED DRUGS (ALT 637 FOR MEDICARE OP): Performed by: INTERNAL MEDICINE

## 2025-02-14 PROCEDURE — 99024 POSTOP FOLLOW-UP VISIT: CPT | Performed by: NURSE PRACTITIONER

## 2025-02-14 PROCEDURE — 2500000004 HC RX 250 GENERAL PHARMACY W/ HCPCS (ALT 636 FOR OP/ED): Performed by: NURSE PRACTITIONER

## 2025-02-14 PROCEDURE — 80053 COMPREHEN METABOLIC PANEL: CPT | Performed by: INTERNAL MEDICINE

## 2025-02-14 PROCEDURE — 2060000001 HC INTERMEDIATE ICU ROOM DAILY

## 2025-02-14 RX ORDER — HEPARIN SODIUM 1000 [USP'U]/ML
2400 INJECTION, SOLUTION INTRAVENOUS; SUBCUTANEOUS
Status: DISCONTINUED | OUTPATIENT
Start: 2025-02-15 | End: 2025-02-18

## 2025-02-14 RX ORDER — PARICALCITOL 2 UG/ML
2 INJECTION, SOLUTION INTRAVENOUS
Status: DISCONTINUED | OUTPATIENT
Start: 2025-02-15 | End: 2025-02-19 | Stop reason: HOSPADM

## 2025-02-14 RX ORDER — PANTOPRAZOLE SODIUM 40 MG/1
40 TABLET, DELAYED RELEASE ORAL
Status: DISCONTINUED | OUTPATIENT
Start: 2025-02-14 | End: 2025-02-19 | Stop reason: HOSPADM

## 2025-02-14 RX ORDER — ALBUMIN HUMAN 250 G/1000ML
25 SOLUTION INTRAVENOUS
Status: CANCELLED | OUTPATIENT
Start: 2025-02-15

## 2025-02-14 RX ORDER — HEPARIN SODIUM 1000 [USP'U]/ML
2200 INJECTION, SOLUTION INTRAVENOUS; SUBCUTANEOUS
Status: DISCONTINUED | OUTPATIENT
Start: 2025-02-15 | End: 2025-02-18

## 2025-02-14 RX ORDER — HEPARIN SODIUM 1000 [USP'U]/ML
1000 INJECTION, SOLUTION INTRAVENOUS; SUBCUTANEOUS
Status: DISCONTINUED | OUTPATIENT
Start: 2025-02-15 | End: 2025-02-18

## 2025-02-14 RX ADMIN — HEPARIN SODIUM 2200 UNITS: 1000 INJECTION, SOLUTION INTRAVENOUS; SUBCUTANEOUS at 12:31

## 2025-02-14 RX ADMIN — PANTOPRAZOLE SODIUM 40 MG: 40 INJECTION, POWDER, FOR SOLUTION INTRAVENOUS at 08:09

## 2025-02-14 RX ADMIN — PIPERACILLIN SODIUM AND TAZOBACTAM SODIUM 2.25 G: 2; .25 INJECTION, SOLUTION INTRAVENOUS at 02:39

## 2025-02-14 RX ADMIN — SUCRALFATE 1 G: 1 TABLET ORAL at 14:57

## 2025-02-14 RX ADMIN — CARVEDILOL 12.5 MG: 12.5 TABLET, FILM COATED ORAL at 08:07

## 2025-02-14 RX ADMIN — SUCRALFATE 1 G: 1 TABLET ORAL at 20:14

## 2025-02-14 RX ADMIN — HEPARIN SODIUM 2400 UNITS: 1000 INJECTION, SOLUTION INTRAVENOUS; SUBCUTANEOUS at 12:32

## 2025-02-14 RX ADMIN — URSODIOL 300 MG: 300 CAPSULE ORAL at 20:13

## 2025-02-14 RX ADMIN — CARVEDILOL 12.5 MG: 12.5 TABLET, FILM COATED ORAL at 20:13

## 2025-02-14 RX ADMIN — POLYETHYLENE GLYCOL 3350 17 G: 17 POWDER, FOR SOLUTION ORAL at 08:07

## 2025-02-14 RX ADMIN — AMLODIPINE BESYLATE 10 MG: 10 TABLET ORAL at 08:07

## 2025-02-14 RX ADMIN — ACETAMINOPHEN 650 MG: 325 TABLET, FILM COATED ORAL at 08:07

## 2025-02-14 RX ADMIN — PANTOPRAZOLE SODIUM 40 MG: 40 TABLET, DELAYED RELEASE ORAL at 14:57

## 2025-02-14 RX ADMIN — SUCRALFATE 1 G: 1 TABLET ORAL at 06:12

## 2025-02-14 RX ADMIN — PIPERACILLIN SODIUM AND TAZOBACTAM SODIUM 2.25 G: 2; .25 INJECTION, SOLUTION INTRAVENOUS at 22:46

## 2025-02-14 RX ADMIN — ACETAMINOPHEN 650 MG: 325 TABLET, FILM COATED ORAL at 01:09

## 2025-02-14 ASSESSMENT — PAIN - FUNCTIONAL ASSESSMENT
PAIN_FUNCTIONAL_ASSESSMENT: 0-10
PAIN_FUNCTIONAL_ASSESSMENT: 0-10
PAIN_FUNCTIONAL_ASSESSMENT: NO/DENIES PAIN
PAIN_FUNCTIONAL_ASSESSMENT: 0-10
PAIN_FUNCTIONAL_ASSESSMENT: 0-10
PAIN_FUNCTIONAL_ASSESSMENT: NO/DENIES PAIN

## 2025-02-14 ASSESSMENT — COGNITIVE AND FUNCTIONAL STATUS - GENERAL
WALKING IN HOSPITAL ROOM: A LITTLE
DAILY ACTIVITIY SCORE: 24
CLIMB 3 TO 5 STEPS WITH RAILING: A LOT
MOBILITY SCORE: 21

## 2025-02-14 ASSESSMENT — PAIN SCALES - GENERAL
PAINLEVEL_OUTOF10: 0 - NO PAIN
PAINLEVEL_OUTOF10: 4

## 2025-02-14 ASSESSMENT — PAIN DESCRIPTION - LOCATION: LOCATION: ABDOMEN

## 2025-02-14 ASSESSMENT — PAIN DESCRIPTION - DESCRIPTORS: DESCRIPTORS: ACHING

## 2025-02-14 NOTE — PROGRESS NOTES
IV to PO Conversion    Yola Doctor is a 54 y.o. female who qualifies for IV to PO therapy conversion.    Inclusion and exclusion criteria have been evaluated. Will change existing order for pantoprazole 40 mg IV every 12 hours  to pantoprazole 40 mg PO every 12 hours  per protocol/policy.      Please call with any questions.    Yvonne Somers, PharmD, BCPS   2/14/2025 1:02 PM

## 2025-02-14 NOTE — PROGRESS NOTES
Yola Barillas is a 54 y.o. female on day 3 of admission presenting with Acute renal failure, unspecified acute renal failure type (CMS-HCC).      Subjective   Seen today to go for dialysis now her symptoms have resolved so far she will need 3 rounds of dialysis complete the third round on Monday and then she will need long-term dialysis in my opinion       Objective     Last Recorded Vitals  /80 (BP Location: Left arm, Patient Position: Lying)   Pulse 86   Temp 36.4 °C (97.5 °F) (Temporal)   Resp 20   Wt 79.4 kg (175 lb)   SpO2 99%   Intake/Output last 3 Shifts:    Intake/Output Summary (Last 24 hours) at 2/14/2025 1605  Last data filed at 2/14/2025 1301  Gross per 24 hour   Intake 1300 ml   Output 3499 ml   Net -2199 ml       Admission Weight  Weight: 79.4 kg (175 lb) (02/11/25 0844)    Daily Weight  02/11/25 : 79.4 kg (175 lb)    Image Results  Invasive vascular procedure  This study is a surgery completed in an invasive cardiovascular space.   Please see OpNote on Notes tab for findings.    Results from last 7 days   Lab Units 02/14/25  0533   WBC AUTO x10*3/uL 8.1   HEMOGLOBIN g/dL 7.9*   HEMATOCRIT % 23.8*   PLATELETS AUTO x10*3/uL 252      Results from last 7 days   Lab Units 02/14/25  0533   SODIUM mmol/L 137   POTASSIUM mmol/L 5.4*   CHLORIDE mmol/L 109*   CO2 mmol/L 17*   BUN mg/dL 103*   CREATININE mg/dL 13.72*   GLUCOSE mg/dL 86   CALCIUM mg/dL 7.0*      Review of systems is no nausea no vomiting at this time no headache    Physical Exam  Lungs are diminished but clear heart has regular rate and rhythm abdomen is soft is not distended or firm  Relevant Results               Assessment/Plan   This patient currently has cardiac telemetry ordered; if you would like to modify or discontinue the telemetry order, click here to go to the orders activity to modify/discontinue the order.    This patient has a central line   Reason for the central line remaining today?             Assessment &  Plan  Acute renal failure, unspecified acute renal failure type (CMS-HCC)    So far continue with the rounds of dialysis get a third round of dialysis done on Monday thereafter I believe I can discharge her at that time     Continue to treat acute urinary tract infection and Enterococcus faecalis infection history of right ureteral stenosis she has a cholecystitis without cholangitis I do not believe that has to be addressed right now I do not think is an acute cholecystitis so that will need to be addressed later after she establishes with dialysis thank you         Rai Puri, DO

## 2025-02-14 NOTE — PROGRESS NOTES
"Yola Barillas is a 54 y.o. female on day 3 of admission presenting with Acute renal failure, unspecified acute renal failure type (CMS-HCC).    Subjective   POD #1 right chest tunneled dialysis catheter placement.  Patient has just slight tenderness to this area.  Negative for hematoma or bleeding.  Patient having hemodialysis today will await if there is any complications.         Objective     Vitals:    02/14/25 0949   BP:    Pulse: 77   Resp:    Temp:    SpO2:       Physical Exam  Constitutional: Well developed , awake/alert/oriented x3, in no distress,  Eyes: Clear sclera  ENMT: mucous membranes are moist, no apparent injury, no lesions seen,   Head/neck: Neck supple, trachea  is midline, no apparent injury, no bruits, no mass, no stridor  Respiratory/thorax: Breath sounds clear and equal diminished bilaterally throughout, thorax symmetric, right chest permacath negative for hematoma, ecchymosis or bleeding.  Cardiac/Vascular: Regular, rate and rhythm, no murmurs, 2+ radial pulses, palpable bilateral popliteals, 1+ DP and PTs  Gastrointestinal: Nondistended soft nontender, positive bowel sounds, no bruits.  Musculoskeletal: Moves all extremities, limited range of motion , no joint swelling,   Extremities: No cyanosis,   Neurological: Alert and oriented x3,   Lymphatic: No significant lymphadenopathy  Skin: Warm and dry, no lesions, no rashes  Psychological: Appropriate mood and behavior  Blood pressure 173/80, pulse 77, temperature 36.5 °C (97.7 °F), temperature source Temporal, resp. rate 20, height 1.6 m (5' 2.99\"), weight 79.4 kg (175 lb), SpO2 99%.        Intake/Output last 3 Shifts:  I/O last 3 completed shifts:  In: 1040 (13.1 mL/kg) [P.O.:440; Blood:350; IV Piggyback:250]  Out: 755 (9.5 mL/kg) [Urine:750 (0.3 mL/kg/hr); Blood:5]  Weight: 79.4 kg     Patient Active Problem List    Diagnosis Date Noted    Acute renal failure, unspecified acute renal failure type (CMS-HCC) 02/11/2025    Type 2 diabetes " mellitus with both eyes affected by proliferative retinopathy without macular edema, without long-term current use of insulin 10/25/2024    Chronic kidney disease, stage 5 (Multi) 09/12/2024    History of hydronephrosis 02/23/2024    Anemia 08/24/2023    CAD (coronary artery disease) 08/24/2023    Fatigue 08/24/2023    H/O non-ST elevation myocardial infarction (NSTEMI) 08/24/2023    HLD (hyperlipidemia) 08/24/2023    Left retinal detachment 08/24/2023    Lower extremity weakness 08/24/2023    Poor vision 08/24/2023    Proteinuria 08/24/2023    UTI (urinary tract infection) 08/24/2023    Vitamin D deficiency 08/24/2023    Episode of recurrent major depressive disorder (CMS-[facility]) 09/23/2022    GERD (gastroesophageal reflux disease) 09/23/2022    Diabetic cataract, associated with type 2 diabetes mellitus (Multi) 05/24/2022    Depression with suicidal ideation 05/11/2022    Cerebrovascular accident (CVA) due to vascular stenosis (Multi) 11/22/2021    Nodular goiter 11/22/2021    Class 1 obesity with body mass index (BMI) of 30.0 to 30.9 in adult 11/17/2021    Primary hypertension 11/17/2021          Current Facility-Administered Medications:     acetaminophen (Tylenol) tablet 650 mg, 650 mg, oral, q4h PRN, 650 mg at 02/14/25 0807 **OR** acetaminophen (Tylenol) oral liquid 650 mg, 650 mg, nasogastric tube, q4h PRN **OR** acetaminophen (Tylenol) suppository 650 mg, 650 mg, rectal, q4h PRN, Jovanni Magallanes PA-C    amLODIPine (Norvasc) tablet 10 mg, 10 mg, oral, Daily, Katie Sousana, APRN-CNP, 10 mg at 02/14/25 0807    carvedilol (Coreg) tablet 12.5 mg, 12.5 mg, oral, BID, Katie Sousana, APRN-CNP, 12.5 mg at 02/14/25 0807    dextrose 50 % injection 12.5 g, 12.5 g, intravenous, q15 min PRN, Katie SHEPARD Vlna, APRN-CNP    dextrose 50 % injection 25 g, 25 g, intravenous, q15 min LEXAN, Katie Hitchcock, APRN-CNP    [START ON 2/16/2025] epoetin mattie-epbx (Retacrit) injection 20,000 Units, 20,000 Units, subcutaneous, Every Sunday,  Chinedu Bryant MD    glucagon (Glucagen) injection 1 mg, 1 mg, intramuscular, q15 min PRN, BRANT Luceor    glucagon (Glucagen) injection 1 mg, 1 mg, intramuscular, q15 min PRN, BRANT Lucero    [START ON 2/15/2025] heparin 1,000 unit/mL injection 1,000 Units, 1,000 Units, intravenous, After Dialysis, Chinedu Bryant MD    heparin 1,000 unit/mL injection 2,200 Units, 2,200 Units, intra-catheter, After Dialysis, Chinedu Bryant MD    [START ON 2/15/2025] heparin 1,000 unit/mL injection 2,200 Units, 2,200 Units, intra-catheter, After Dialysis, Chinedu Bryant MD    heparin 1,000 unit/mL injection 2,400 Units, 2,400 Units, intra-catheter, After Dialysis, Chinedu Bryant MD    [START ON 2/15/2025] heparin 1,000 unit/mL injection 2,400 Units, 2,400 Units, intra-catheter, After Dialysis, Chinedu Bryant MD    insulin lispro injection 0-10 Units, 0-10 Units, subcutaneous, TID AC, BRANT Lucero, 2 Units at 02/12/25 1600    [START ON 2/15/2025] iron sucrose (Venofer) injection 50 mg, 50 mg, intravenous, After Dialysis, Chinedu Bryant MD    pantoprazole (ProtoNix) injection 40 mg, 40 mg, intravenous, BID, BRANT Lucero, 40 mg at 02/14/25 0809    [START ON 2/15/2025] paricalcitoL (Zemplar) injection 2 mcg, 2 mcg, intravenous, After Dialysis, Chinedu Bryant MD    piperacillin-tazobactam (Zosyn) 2.25 g in dextrose (iso) IV 50 mL, 2.25 g, intravenous, q8h, Chinedu Bryant MD, Stopped at 02/14/25 0340    polyethylene glycol (Glycolax, Miralax) packet 17 g, 17 g, oral, Daily, BRANT Lai, 17 g at 02/14/25 0807    sucralfate (Carafate) tablet 1 g, 1 g, oral, Before meals & nightly, Chinedu Bryant MD, 1 g at 02/14/25 0612    ursodiol (Actigall) capsule 300 mg, 300 mg, oral, Nightly, Chinedu Bryant MD, 300 mg at 02/13/25 2125     Lab Results   Component Value Date    WBC 8.1 02/14/2025    HGB 7.9 (L) 02/14/2025     HCT 23.8 (L) 02/14/2025     02/14/2025    CHOL 347 (H) 10/25/2024    TRIG 121 10/25/2024    HDL 87.6 10/25/2024    ALT 7 02/14/2025    AST 33 02/14/2025     02/14/2025    K 5.4 (H) 02/14/2025     (H) 02/14/2025    CREATININE 13.72 (H) 02/14/2025     (HH) 02/14/2025    CO2 17 (L) 02/14/2025    TSH 1.29 05/02/2024    INR 1.1 02/13/2025    HGBA1C 4.9 10/25/2024       CT abdomen pelvis wo IV contrast    Result Date: 2/11/2025  Interpreted By:  Mary Medina, STUDY: CT ABDOMEN PELVIS WO IV CONTRAST;  2/11/2025 1:20 pm   INDICATION: Signs/Symptoms:abd pain.   COMPARISON: 04/16/2024   ACCESSION NUMBER(S): [id number]   ORDERING CLINICIAN: MAR AREVALO   TECHNIQUE: CT of the abdomen and pelvis was performed. No oral, no intravenous contrast.   FINDINGS: LOWER CHEST: Images of the lung bases show no infiltrate or pleural fluid. There is a small pericardial effusion, new   ABDOMEN:   LIVER: There is a benign hepatic cyst.   BILE DUCTS: There is no intrahepatic, common hepatic or common bile ductal dilatation.   GALLBLADDER: There are multiple layering calcified gallstones in a distended but otherwise unremarkable gallbladder.   PANCREAS: The pancreas is unremarkable.   SPLEEN: The spleen is unremarkable. There is no splenic mass or splenomegaly.   ADRENAL GLANDS: The adrenal glands are unremarkable.   KIDNEYS AND URETERS: The kidneys demonstrate no mass.  There is no intrarenal calculus or hydronephrosis.   BOWEL: There is no bowel wall thickening, dilatation or obstruction. The appendix is normal.   VESSELS: There is marked atherosclerotic calcification of the vessels of the abdomen and pelvis. There is atherosclerotic calcification of the abdominal aorta common iliac and femoral arteries. There is calcification of the renal arteries, mesenteric arteries and splenic artery.   PERITONEUM/RETROPERITONEUM/LYMPH NODES: There is no retroperitoneal or pelvic adenopathy.  There is no ascites.   ABDOMINAL  WALL: There is reticulation of the subcutaneous fat encircling the abdomen and pelvis extending into the proximal thighs consistent with anasarca/hypoproteinemia.   BONE AND SOFT TISSUE: There is no acute osseous finding.   There is no soft tissue abnormality. Status post hysterectomy.       1. Cholelithiasis. 2. Anasarca/hypoproteinemia. 3. Status post hysterectomy. 4. Benign hepatic cyst. 5. Small pericardial effusion.   MACRO: None   Signed by: Mary Medina 2/11/2025 1:25 PM Dictation workstation:   [id number]                Assessment/Plan   Assessment & Plan  Acute renal failure, unspecified acute renal failure type (CMS-HCC)    POD #1 tunneled dialysis catheter placement right chest by Dr. Padron.  Having hemodialysis with catheter will await if there is any complications.  Right chest site some tenderness no ecchymosis or active bleeding.  Please contact Dr. Padron with any issues with catheter.  Vascular surgery will follow while patient is hospitalized.  Patient should follow-up with Dr. Padron after discharge for possible AV fistula/graft or for any complications with tunneled dialysis catheter.    Thank you very much for allowing Vascular Surgery to be involved in the care of your patient sincerely Rony CASANOVA-CNP .  (This note was generated with voice recognition software and may contain errors including spelling, grammar, syntax and missed recognition of what was dictated, of which may not have been fully corrected)    Rony Mace, APRN-CNP

## 2025-02-14 NOTE — POST-PROCEDURE NOTE
Report to Receiving RN:    Report To: ***  Time Report Called: 1225  Hand-Off Communication: tx ended 17 minutes early d/t circuit clotting; no s/s of distress post tx; pt discharged stable back to nursing floor  Complications During Treatment: Yes, see note above  Ultrafiltration Treatment: Yes, 1.2 liters  Medications Administered During Dialysis: No  Blood Products Administered During Dialysis: No  Labs Sent During Dialysis: No  Heparin Drip Rate Changes: No  Dialysis Catheter Dressing: clean, dry and intact  Last Dressing Change: 2/14/25    Electronic Signatures:  Vilma Martínez RN (Signed CE)       Last Updated: 12:25 PM by VILMA MARTÍNEZ

## 2025-02-14 NOTE — PROGRESS NOTES
Pt's [facility] referral is being processed.  Pt's reference number is  [id number] with [facility]. Pt's case has been assigned to Shirin phone [phone] ext [phone].  [facility] may have a 3rd shift available for a MWF schedule.  We are waiting to hear back if they are able to accommodate. Sw to keep pt updated on the chair time status.     4:30pm- Still waiting on a dialysis chair time. Sw notified pt and family.  Sw uploaded the dialysis flow sheets from todays treatment.  Sw to keep pt updated once a chair time is confirmed.

## 2025-02-14 NOTE — PROGRESS NOTES
"    Subjective   Patient's HIDA scan is negative  No voice complaint  Patient was seen while at hemodialysis  Vitals are stable.  BUN and creatinine 103 and 13.72 with a GFR of 3  Objective          Vitals 24HR  Heart Rate:  [70-83]   Temp:  [35.8 °C (96.4 °F)-36.5 °C (97.7 °F)]   Resp:  [18-23]   BP: (152-173)/(76-86)   Height:  [160 cm (5' 2.99\")]   SpO2:  [97 %-100 %]         Intake/Output last 3 Shifts:    Intake/Output Summary (Last 24 hours) at 2/14/2025 1250  Last data filed at 2/14/2025 1130  Gross per 24 hour   Intake 1150 ml   Output 605 ml   Net 545 ml       Physical Exam    General appearance; alert pleasant  HEENT; pupils react to light and accommodation  Neck; no JVD no bruit no thyromegaly  No cervical adenopathy  Lungs; clear to auscultation and percussion  Heart; regular rate ;no rubs; no thrills   Abdomen; positive Hernandez's point  Active peristalsis. Tender Both Lower Quadrants  Extremities; 2+ edema  Neurological; no tremors no asterixis       Relevant Results  Results for orders placed or performed during the hospital encounter of 02/11/25 (from the past 24 hours)   POCT GLUCOSE   Result Value Ref Range    POCT Glucose 106 (H) 74 - 99 mg/dL   Hemoglobin and hematocrit, blood   Result Value Ref Range    Hemoglobin 8.5 (L) 12.0 - 16.0 g/dL    Hematocrit 25.3 (L) 36.0 - 46.0 %   POCT GLUCOSE   Result Value Ref Range    POCT Glucose 144 (H) 74 - 99 mg/dL   SST TOP   Result Value Ref Range    Extra Tube Hold for add-ons.    CBC   Result Value Ref Range    WBC 8.1 4.4 - 11.3 x10*3/uL    nRBC 0.0 0.0 - 0.0 /100 WBCs    RBC 2.75 (L) 4.00 - 5.20 x10*6/uL    Hemoglobin 7.9 (L) 12.0 - 16.0 g/dL    Hematocrit 23.8 (L) 36.0 - 46.0 %    MCV 87 80 - 100 fL    MCH 28.7 26.0 - 34.0 pg    MCHC 33.2 32.0 - 36.0 g/dL    RDW 12.4 11.5 - 14.5 %    Platelets 252 150 - 450 x10*3/uL   Comprehensive Metabolic Panel   Result Value Ref Range    Glucose 86 74 - 99 mg/dL    Sodium 137 136 - 145 mmol/L    Potassium 5.4 (H) 3.5 " - 5.3 mmol/L    Chloride 109 (H) 98 - 107 mmol/L    Bicarbonate 17 (L) 21 - 32 mmol/L    Anion Gap 16 10 - 20 mmol/L    Urea Nitrogen 103 (HH) 6 - 23 mg/dL    Creatinine 13.72 (H) 0.50 - 1.05 mg/dL    eGFR 3 (L) >60 mL/min/1.73m*2    Calcium 7.0 (L) 8.6 - 10.3 mg/dL    Albumin 2.4 (L) 3.4 - 5.0 g/dL    Alkaline Phosphatase 56 33 - 110 U/L    Total Protein 5.5 (L) 6.4 - 8.2 g/dL    AST 33 9 - 39 U/L    Bilirubin, Total 0.5 0.0 - 1.2 mg/dL    ALT 7 7 - 45 U/L   POCT GLUCOSE   Result Value Ref Range    POCT Glucose 86 74 - 99 mg/dL   NM hepatobiliary  Status: Final result     PACS Images     Show images for NM hepatobiliary  Signed by    Signed Time Phone Pager   Zeferino Travis MD 2/13/2025 14:55 [phone]     Exam Information    Status Exam Begun Exam Ended   Final 2/13/2025 13:48 2/13/2025 14:48     Study Result    Narrative & Impression   Interpreted By:  Zeferino Travis,   STUDY:  NM HEPATOBILIARY; 2/13/2025 2:48 pm      INDICATION:  Signs/Symptoms:Cholelthiasis.      COMPARISON:  CT of abdomen and pelvis on 02/11/2025      ACCESSION NUMBER(S):  [id number]      ORDERING CLINICIAN:  BART DASILVA      TECHNIQUE:  DIVISION OF NUCLEAR MEDICINE  HEPATOBILIARY SCAN (HIDA)      The patient received an intravenous dose of 4.1 mCi of Tc-99m  mebrofenin (Choletec).  Sequential images of the upper abdomen were  then acquired over the next 60 minutes.      FINDINGS:  There is prompt accumulation of activity within the liver and normal  subsequent excretion via the biliary ductal system into the small  bowel.  The gallbladder first visualizes at about  14 minutes after  radiopharmaceutical injection and progressively fills.      IMPRESSION:  1. Patency of cystic duct and common bile duct without evidence of  acute cholecystitis.      I personally reviewed the images/study and I agree with the findings  as stated. This study was interpreted at [place], [place].       MACRO:  None          Signed by: Zeferino Travis 2/13/2025 2:55 PM     CT abdomen pelvis wo IV contrast    Result Date: 2/11/2025  Interpreted By:  Mary Medina, STUDY: CT ABDOMEN PELVIS WO IV CONTRAST;  2/11/2025 1:20 pm   INDICATION: Signs/Symptoms:abd pain.   COMPARISON: 04/16/2024   ACCESSION NUMBER(S): [id number]   ORDERING CLINICIAN: MAR AREVALO   TECHNIQUE: CT of the abdomen and pelvis was performed. No oral, no intravenous contrast.   FINDINGS: LOWER CHEST: Images of the lung bases show no infiltrate or pleural fluid. There is a small pericardial effusion, new   ABDOMEN:   LIVER: There is a benign hepatic cyst.   BILE DUCTS: There is no intrahepatic, common hepatic or common bile ductal dilatation.   GALLBLADDER: There are multiple layering calcified gallstones in a distended but otherwise unremarkable gallbladder.   PANCREAS: The pancreas is unremarkable.   SPLEEN: The spleen is unremarkable. There is no splenic mass or splenomegaly.   ADRENAL GLANDS: The adrenal glands are unremarkable.   KIDNEYS AND URETERS: The kidneys demonstrate no mass.  There is no intrarenal calculus or hydronephrosis.   BOWEL: There is no bowel wall thickening, dilatation or obstruction. The appendix is normal.   VESSELS: There is marked atherosclerotic calcification of the vessels of the abdomen and pelvis. There is atherosclerotic calcification of the abdominal aorta common iliac and femoral arteries. There is calcification of the renal arteries, mesenteric arteries and splenic artery.   PERITONEUM/RETROPERITONEUM/LYMPH NODES: There is no retroperitoneal or pelvic adenopathy.  There is no ascites.   ABDOMINAL WALL: There is reticulation of the subcutaneous fat encircling the abdomen and pelvis extending into the proximal thighs consistent with anasarca/hypoproteinemia.   BONE AND SOFT TISSUE: There is no acute osseous finding.   There is no soft tissue abnormality. Status post hysterectomy.       1. Cholelithiasis. 2.  Anasarca/hypoproteinemia. 3. Status post hysterectomy. 4. Benign hepatic cyst. 5. Small pericardial effusion.   MACRO: None   Signed by: Mary Medina 2/11/2025 1:25 PM Dictation workstation:   [id number]    ECG 12 lead    Result Date: 1/29/2025  Sinus tachycardia Probable left atrial enlargement Left anterior fascicular block    XR chest 2 views    Result Date: 1/28/2025  STUDY: Chest Radiographs;  1/28/2025 at 7:05 PM. INDICATION: Cough, flu. COMPARISON: None available. ACCESSION NUMBER(S): [id number] ORDERING CLINICIAN: NATACHA REYES TECHNIQUE:  Frontal and lateral chest. FINDINGS: CARDIOMEDIASTINAL SILHOUETTE: Cardiomediastinal silhouette is normal in size and configuration.  LUNGS: Lungs are clear.  ABDOMEN: No remarkable upper abdominal findings.  BONES: No acute osseous changes.  Moderate scoliosis present.    No focal infiltrate identified. Signed by Niko Marcos MD    Transthoracic Echo (TTE) Complete    Result Date: 1/22/2025   [facility], [address] [place] [address]           Tel [phone] and Fax [phone] TRANSTHORACIC ECHOCARDIOGRAM REPORT  Patient Name:       REHAN DOCTOR    Reading Physician:    Freddy Loza MD Study Date:         1/22/2025           Ordering Provider:    Freddy LOZA MRN/PID:            [id number]            Fellow: Accession#:         [id number]        Nurse: Date of Birth/Age:  1970 / 54 years Sonographer:          Amee Murphy                                                               [facility] Gender assigned at  F                   Additional Staff: Birth: Height:             162.56 cm           Admit Date: Weight:             79.38 kg            Admission Status: BSA / BMI:          1.85 m2 / 30.04     Encounter#:           [id number]                     kg/m2 Blood Pressure:     142/82 mmHg          Department Location:  [facility] Outpatient Study Type:    TRANSTHORACIC ECHO (TTE) COMPLETE Diagnosis/ICD: Atherosclerotic heart disease of native coronary artery without                angina pectoris-I25.10; Old myocardial infarction-I25.2 Indication:    AS[facility] CPT Code:      Echo Complete w Full Doppler-47910  Study Detail: The following Echo studies were performed: 2D, M-Mode, Doppler and               color flow.  PHYSICIAN INTERPRETATION: Left Ventricle: Left ventricular ejection fraction is normal, by visual estimate at 65-70%. There is moderate concentric left ventricular hypertrophy. There are no regional left ventricular wall motion abnormalities. The left ventricular cavity size is normal. There is moderately increased septal and moderately increased posterior left ventricular wall thickness. Spectral Doppler shows a Grade I (impaired relaxation pattern) of left ventricular diastolic filling with normal left atrial filling pressure. Left Atrium: The left atrium is normal in size. Right Ventricle: The right ventricle is normal in size. There is normal right ventricular global systolic function. Right Atrium: The right atrium is normal in size. Aortic Valve: The aortic valve is trileaflet. There is moderate aortic valve thickening. There is evidence of mild aortic valve stenosis. The aortic valve dimensionless index is 0.62. There is no evidence of aortic valve regurgitation. The peak instantaneous gradient of the aortic valve is 20 mmHg. The mean gradient of the aortic valve is 10 mmHg. Mitral Valve: The mitral valve is moderately thickened with a myxomatous appearance. There is mitral valve prolapse. There is mild late systolic mitral valve prolapse of the anterior leaflet. The peak instantaneous gradient of the mitral valve is 8 mmHg. There is mild to moderate mitral valve regurgitation which is laterally directed. Tricuspid Valve: The tricuspid valve is structurally normal. There is trace to mild tricuspid  regurgitation. Pulmonic Valve: The pulmonic valve is structurally normal. There is trace to mild pulmonic valve regurgitation. Pericardium: There is no pericardial effusion noted. Aorta: The aortic root is normal. There is no dilatation of the aortic arch. There is no dilatation of the ascending aorta. There is no dilatation of the aortic root. Systemic Veins: The inferior vena cava appears normal in size.  CONCLUSIONS:  1. Left ventricular ejection fraction is normal, by visual estimate at 65-70%.  2. Spectral Doppler shows a Grade I (impaired relaxation pattern) of left ventricular diastolic filling with normal left atrial filling pressure.  3. There is moderately increased septal and moderately increased posterior left ventricular wall thickness.  4. There is moderate concentric left ventricular hypertrophy.  5. There is normal right ventricular global systolic function.  6. The mitral valve is moderately thickened with a myxomatous appearance.  7. Mild to moderate mitral valve regurgitation.  8. Mild aortic valve stenosis. QUANTITATIVE DATA SUMMARY:  2D MEASUREMENTS:          Normal Ranges: LAs:             3.48 cm  (2.7-4.0cm) RVIDd:           2.36 cm  (0.9-3.6cm) IVSd:            1.52 cm  (0.6-1.1cm) LVPWd:           1.50 cm  (0.6-1.1cm) LVIDd:           4.47 cm  (3.9-5.9cm) LVIDs:           3.04 cm LV Mass Index:   149 g/m2 LVEDV Index:     53 ml/m2 LV % FS          32.0 %  LA VOLUME:                    Normal Ranges: LA Vol A4C:        75.6 ml    (22+/-6mL/m2) LA Vol A2C:        96.6 ml LA Vol BP:         89.2 ml LA Vol Index A4C:  40.9 ml/m2 LA Vol Index A2C:  52.3 ml/m2 LA Vol Index BP:   48.3 ml/m2 LA Area A4C:       23.1 cm2 LA Area A2C:       25.0 cm2 LA Major Axis A4C: 6.0 cm LA Major Axis A2C: 5.5 cm LA Vol A4C:        74.4 ml LA Vol A2C:        87.9 ml LA Vol Index BSA:  43.9 ml/m2  RA VOLUME BY A/L METHOD:            Normal Ranges: RA Vol A4C:              34.7 ml    (8.3-19.5ml) RA Vol Index A4C:         18.8 ml/m2 RA Area A4C:             13.7 cm2 RA Major Axis A4C:       4.6 cm  AORTA MEASUREMENTS:         Normal Ranges: Ao STJ, d:          2.70 cm (1.7-3.4cm) Asc Ao, d:          3.30 cm (2.1-3.4cm)  LV SYSTOLIC FUNCTION BY 2D PLANIMETRY (MOD):                      Normal Ranges: EF-A4C View:    60 % (>=55%) EF-A2C View:    65 % EF-Biplane:     67 % EF-Visual:      68 % LV EF Reported: 68 %  LV DIASTOLIC FUNCTION:           Normal Ranges: MV Peak E:             1.15 m/s  (0.7-1.2 m/s) MV Peak A:             1.37 m/s  (0.42-0.7 m/s) E/A Ratio:             0.84      (1.0-2.2) MV e'                  0.008 m/s (>8.0) MV lateral e'          0.01 m/s MV medial e'           0.01 m/s E/e' Ratio:            153.16    (<8.0)  MITRAL VALVE:          Normal Ranges: MV Vmax:      1.42 m/s (<=1.3m/s) MV peak P.1 mmHg (<5mmHg) MV mean PG:   3.6 mmHg (<2mmHg) MV VTI:       33.40 cm (10-13cm) MV DT:        167 msec (150-240msec)  AORTIC VALVE:                      Normal Ranges: AoV Vmax:                2.26 m/s  (<=1.7m/s) AoV Peak P.4 mmHg (<20mmHg) AoV Mean PG:             10.0 mmHg (1.7-11.5mmHg) LVOT Max Romeo:            1.45 m/s  (<=1.1m/s) AoV VTI:                 47.24 cm  (18-25cm) LVOT VTI:                29.45 cm LVOT Diameter:           2.04 cm   (1.8-2.4cm) AoV Area, VTI:           2.04 cm2  (2.5-5.5cm2) AoV Area,Vmax:           2.10 cm2  (2.5-4.5cm2) AoV Dimensionless Index: 0.62  RIGHT VENTRICLE: RV Basal 3.00 cm RV Mid   2.50 cm RV Major 6.4 cm TAPSE:   23.0 mm RV s'    0.16 m/s  TRICUSPID VALVE/RVSP:          Normal Ranges: Peak TR Velocity:     2.50 m/s RV Syst Pressure:     28 mmHg  (< 30mmHg)  AORTA: Asc Ao Diam 3.29 cm  [id number] Marcos Ma MD Electronically signed on 2025 at 12:54:55 PM  ** Final **         Assessment/Plan   Acute kidney injury superimposed on chronic kidney disease stage V  Pyocystitis  Enterococcus faecalis infection  History of right ureteral  stenosis  Cholecystitis without cholangitis  Anemia of chronic disease and renal failure  Abdominal pain  Anasarca  Hyperuricemia  Secondary hyperparathyroidism of renal origin plan  Plan  Patient will be dialyzed again tomorrow        Assessment & Plan  Acute renal failure, unspecified acute renal failure type (CMS-HCC)           I spent 30  minutes in the professional and overall care of this patient.      Chinedu Bryant MD

## 2025-02-14 NOTE — OP NOTE
Tunnel Dialysis Catheter Exchange (R) Operative Note     Date: 2025  OR Location: [facility] Cardiac Cath Lab    Name: Yola Barillas, : 1970, Age: 54 y.o., MRN: [id number], Sex: female    Diagnosis  Pre-op Diagnosis      * Acute renal failure, unspecified acute renal failure type (CMS-HCC) [N17.9] Post-op Diagnosis     * Acute renal failure, unspecified acute renal failure type (CMS-HCC) [N17.9]     Procedures  Tunnel Dialysis Catheter Exchange  44316 - SD INSJ TUNNELED CVC W/O SUBQ PORT/ AGE 5 YR/>      Surgeons      * Nabeel Padron - Primary    Resident/Fellow/Other Assistant:  Surgeons and Role:  * No surgeons found with a matching role *    Staff:   Johanaulator: Marisa Guido Person: Michelle    Anesthesia Staff: No anesthesia staff entered.    Procedure Summary  Anesthesia: Anesthesia type not filed in the log.  ASA: ASA status not filed in the log.  Estimated Blood Loss: 10mL  Intra-op Medications:   Administrations occurring from 1537 to 1622 on 25:   Medication Name Total Dose   lidocaine (Xylocaine) 20 mg/mL (2 %) injection 15 mL   insulin lispro injection 0-10 Units Cannot be calculated         Intraprocedure I/O Totals       None           Specimen: No specimens collected              Drains and/or Catheters: * None in log *    Tourniquet Times:         Implants:     Findings:     Indications: Yola Barillas is an 54 y.o. female who is having surgery for * No pre-op diagnosis entered *.     The patient was seen in the preoperative area. The risks, benefits, complications, treatment options, non-operative alternatives, expected recovery and outcomes were discussed with the patient. The possibilities of reaction to medication, pulmonary aspiration, injury to surrounding structures, bleeding, recurrent infection, the need for additional procedures, failure to diagnose a condition, and creating a complication requiring transfusion or operation were discussed with the patient. The patient  concurred with the proposed plan, giving informed consent.  The site of surgery was properly noted/marked if necessary per policy. The patient has been actively warmed in preoperative area. Preoperative antibiotics have been ordered and given within 1 hours of incision. Venous thrombosis prophylaxis are not indicated.    Procedure Details: After the procedure was fully explained to the patient after adequate consent form was signed, the patient had an IV and was receiving preoperative IV antibiotics.  The patient was then taken to the interventional suite and was placed in the supine position.  The entire right neck and chest region was cleaned and prepped in usual sterile fashion.  Using lidocaine, the skin subcutaneous tissue overlying the right internal jugular vein was anesthetized.  An angio access needle was then used to cannulate the vein.  A guidewire was advanced into the the superior vena cava and this is seen under fluoroscopy.  A small transverse incision was made to excise the guidewire.  Dilators were then used to dilate the soft tissues.  A dilator and sheath was advanced over the guidewire and into the superior vena cava.  The dilator and guidewire were then removed.  A heparinized catheter was advanced through the sheath and into the superior vena cava.  The sheath was then removed without difficulty.  The catheter was then tunneled in standard fashion to the right infraclavicular region.  The catheter was cut to appropriate length was placed and the ports were then placed.  Both ports had excellent venous flashback and was flushed with heparinized saline.  A total of 10,000 units of heparin was placed in each port for indwelling heparinization.  The right neck wound was irrigated with normal saline and then closed using inverted interrupted sutures of 4-0 Monocryl.  Dermabond was then used to seal the incision.  Telfa Tegaderm was applied over the exit site of the catheter.  The catheter was  sutured to the anterior chest wall with interrupted sutures of 2-0 Prolene.  The patient tolerated the procedure well.  No complications noted.  Instrument count needle count sponge counts correct.  Thank you very much this ends dictation  Complications:  None; patient tolerated the procedure well.    Disposition: PACU - hemodynamically stable.  Condition: stable                 Additional Details:     Attending Attestation: I was present and scrubbed for the entire procedure.    Nabeel Padron  Phone Number: [phone]

## 2025-02-14 NOTE — PROGRESS NOTES
Medication Adjustment    The following medication(s) was/were adjusted for Yola Barillas per protocol/policy due to altered renal function.    Medication(s) adjusted:   Zosyn 2.25gm IV Q8 hours adjusted to Zosyn 2.25gm IV Q12 hours for non pseudomonas and non pneumonia infection, patient started hemodialysis 2/14/25     Yvonne Somers, DestineyD, BCPS   2/14/2025 2:29 PM

## 2025-02-14 NOTE — PRE-PROCEDURE NOTE
Report from Sending RN:    Report From: Collette  Recent Surgery of Procedure: Yes, hd catheter placed 2/13/25  Baseline Level of Consciousness (LOC): a/o x3  Oxygen Use: No  Type: room air  Diabetic: Yes, 86  Last BP Med Given Day of Dialysis: See MAR  Last Pain Med Given: See MAR  Lab Tests to be Obtained with Dialysis: No  Blood Transfusion to be Given During Dialysis: No  Available IV Access: No  Medications to be Administered During Dialysis: No  Continuous IV Infusion Running: No  Restraints on Currently or in the Last 24 Hours: No  Hand-Off Communication: full code; stable for hd; cvc  Dialysis Catheter Dressing: tbd  Last Dressing Change: tbd

## 2025-02-14 NOTE — PROGRESS NOTES
Yola Barillas is a 54 y.o. female on day 2 of admission presenting with Acute renal failure, unspecified acute renal failure type (CMS-HCC).      Subjective   Seen today her H&H had dropped she is back up transfused not complaining of any nausea or abdominal pain at this time she has end-stage renal disease she is going to have dialysis first thing in the morning       Objective     Last Recorded Vitals  /76 (BP Location: Left arm, Patient Position: Lying)   Pulse 75   Temp 36.2 °C (97.2 °F) (Temporal)   Resp 18   Wt 79.4 kg (175 lb)   SpO2 97%   Intake/Output last 3 Shifts:    Intake/Output Summary (Last 24 hours) at 2/13/2025 2010  Last data filed at 2/13/2025 1616  Gross per 24 hour   Intake 740 ml   Output 155 ml   Net 585 ml       Admission Weight  Weight: 79.4 kg (175 lb) (02/11/25 0844)    Daily Weight  02/11/25 : 79.4 kg (175 lb)    Image Results  Invasive vascular procedure  This study is a surgery completed in an invasive cardiovascular space.   Please see OpNote on Notes tab for findings.  NM hepatobiliary  Narrative: Interpreted By:  Zeferino Travis,   STUDY:  NM HEPATOBILIARY; 2/13/2025 2:48 pm      INDICATION:  Signs/Symptoms:Cholelthiasis.      COMPARISON:  CT of abdomen and pelvis on 02/11/2025      ACCESSION NUMBER(S):  [id number]      ORDERING CLINICIAN:  BART DASILVA      TECHNIQUE:  DIVISION OF NUCLEAR MEDICINE  HEPATOBILIARY SCAN (HIDA)      The patient received an intravenous dose of 4.1 mCi of Tc-99m  mebrofenin (Choletec).  Sequential images of the upper abdomen were  then acquired over the next 60 minutes.      FINDINGS:  There is prompt accumulation of activity within the liver and normal  subsequent excretion via the biliary ductal system into the small  bowel.  The gallbladder first visualizes at about  14 minutes after  radiopharmaceutical injection and progressively fills.      Impression: 1. Patency of cystic duct and common bile duct without evidence of  acute  cholecystitis.      I personally reviewed the images/study and I agree with the findings  as stated. This study was interpreted at [facility], [place], [place].      MACRO:  None          Signed by: Zeferino Travis 2/13/2025 2:55 PM  Dictation workstation:   [id number]    Results from last 7 days   Lab Units 02/13/25  1717 02/13/25  0552   WBC AUTO x10*3/uL  --  5.6   HEMOGLOBIN g/dL 8.5* 6.7*   HEMATOCRIT % 25.3* 20.6*   PLATELETS AUTO x10*3/uL  --  281      Results from last 7 days   Lab Units 02/13/25  0552 02/11/25  1958 02/11/25  1051   SODIUM mmol/L 137   < > 136   POTASSIUM mmol/L 4.8   < > 5.8*   CHLORIDE mmol/L 108*   < > 108*   CO2 mmol/L 17*   < > 16*   BUN mg/dL 99*   < > 103*   CREATININE mg/dL 13.41*   < > 15.19*   CALCIUM mg/dL 7.2*   < > 7.9*   PROTEIN TOTAL g/dL  --   --  7.0   BILIRUBIN TOTAL mg/dL  --   --  0.6   ALK PHOS U/L  --   --  84   ALT U/L  --   --  8   AST U/L  --   --  44*   GLUCOSE mg/dL 90   < > 149*    < > = values in this interval not displayed.      In the review of systems is weakness and no further nausea no further abdominal pain    Physical Exam  Lungs are diminished but clear heart has a regular rate and rhythm abdomen is soft is not distended or firm  Relevant Results               Assessment/Plan   This patient currently has cardiac telemetry ordered; if you would like to modify or discontinue the telemetry order, click here to go to the orders activity to modify/discontinue the order.    This patient has a central line   Reason for the central line remaining today?             Assessment & Plan  Acute renal failure, unspecified acute renal failure type (CMS-HCC)    Acute on chronic kidney disease and she has a urinary tract infection with Enterococcus so far she is going to need dialysis she is going to have her first round of dialysis in the morning renal catheter has been placed    I will continue to treat her urinary tract infection as well  I am going to repeat her CBC I am also observe that she has cholecystitis without cholangitis she will probably have to have that addressed at a later date she is going to need dialysis for 3 rounds      Many continue with antibiotic therapy and I will probably put her on oral antibiotics over the next 7 to 14 days as well I will have to keep her through the weekend she will need 3 rounds of dialysis before going home and then she will need outpatient dialysis I believe she is going to need permanent dialysis I believe because of her chronic kidney disease underlying I will talk with Dr. Bryant regarding that and continue with all other present care and therapy thank you              Rai Puri DO

## 2025-02-14 NOTE — CARE PLAN
Problem: Pain - Adult  Goal: Verbalizes/displays adequate comfort level or baseline comfort level  Outcome: Progressing   The patient's goals for the shift include      The clinical goals for the shift include remain HDS      Problem: Pain - Adult  Goal: Verbalizes/displays adequate comfort level or baseline comfort level  Outcome: Progressing

## 2025-02-14 NOTE — POST-PROCEDURE NOTE
Report to Receiving RN:    Report To: Collette Time Report Called: [id number]  Hand-Off Communication: tx ended 17 minutes early d/t circuit clotting; pt tolerated tx well otherwise; pt discharged stable back to nursing floor  Complications During Treatment: Yes, see note above  Ultrafiltration Treatment: Yes, 1.2 liters  Medications Administered During Dialysis: No  Blood Products Administered During Dialysis: No  Labs Sent During Dialysis: No  Heparin Drip Rate Changes: No  Dialysis Catheter Dressing: clean, dry and intact  Last Dressing Change: 2/14/25    Electronic Signatures:  Vilma Martínez RN (Signed CE)     Last Updated: 1:07 PM by VILMA MARTÍNEZ

## 2025-02-14 NOTE — CARE PLAN
The patient's goals for the shift include      The clinical goals for the shift include comfort    Over the shift, the patient did not make progress toward the following goals. Barriers to progression include . Recommendations to address these barriers include .

## 2025-02-15 ENCOUNTER — APPOINTMENT (OUTPATIENT)
Dept: DIALYSIS | Facility: HOSPITAL | Age: 55
End: 2025-02-15
Payer: MEDICARE

## 2025-02-15 LAB
ALBUMIN SERPL BCP-MCNC: 2.3 G/DL (ref 3.4–5)
ALP SERPL-CCNC: 49 U/L (ref 33–110)
ALT SERPL W P-5'-P-CCNC: 6 U/L (ref 7–45)
ANION GAP SERPL CALC-SCNC: 17 MMOL/L (ref 10–20)
AST SERPL W P-5'-P-CCNC: 30 U/L (ref 9–39)
BILIRUB SERPL-MCNC: 0.5 MG/DL (ref 0–1.2)
BUN SERPL-MCNC: 67 MG/DL (ref 6–23)
CALCIUM SERPL-MCNC: 7 MG/DL (ref 8.6–10.3)
CHLORIDE SERPL-SCNC: 106 MMOL/L (ref 98–107)
CO2 SERPL-SCNC: 21 MMOL/L (ref 21–32)
CREAT SERPL-MCNC: 9.21 MG/DL (ref 0.5–1.05)
EGFRCR SERPLBLD CKD-EPI 2021: 5 ML/MIN/1.73M*2
ERYTHROCYTE [DISTWIDTH] IN BLOOD BY AUTOMATED COUNT: 12.8 % (ref 11.5–14.5)
GLUCOSE BLD MANUAL STRIP-MCNC: 151 MG/DL (ref 74–99)
GLUCOSE BLD MANUAL STRIP-MCNC: 151 MG/DL (ref 74–99)
GLUCOSE BLD MANUAL STRIP-MCNC: 99 MG/DL (ref 74–99)
GLUCOSE SERPL-MCNC: 104 MG/DL (ref 74–99)
HCT VFR BLD AUTO: 22.3 % (ref 36–46)
HGB BLD-MCNC: 7.2 G/DL (ref 12–16)
MCH RBC QN AUTO: 28.2 PG (ref 26–34)
MCHC RBC AUTO-ENTMCNC: 32.3 G/DL (ref 32–36)
MCV RBC AUTO: 88 FL (ref 80–100)
NRBC BLD-RTO: 0 /100 WBCS (ref 0–0)
PLATELET # BLD AUTO: 190 X10*3/UL (ref 150–450)
POTASSIUM SERPL-SCNC: 4.5 MMOL/L (ref 3.5–5.3)
PROT SERPL-MCNC: 5 G/DL (ref 6.4–8.2)
RBC # BLD AUTO: 2.55 X10*6/UL (ref 4–5.2)
SODIUM SERPL-SCNC: 139 MMOL/L (ref 136–145)
WBC # BLD AUTO: 8 X10*3/UL (ref 4.4–11.3)

## 2025-02-15 PROCEDURE — 8010000001 HC DIALYSIS - HEMODIALYSIS PER DAY

## 2025-02-15 PROCEDURE — 2500000004 HC RX 250 GENERAL PHARMACY W/ HCPCS (ALT 636 FOR OP/ED): Performed by: INTERNAL MEDICINE

## 2025-02-15 PROCEDURE — 2500000004 HC RX 250 GENERAL PHARMACY W/ HCPCS (ALT 636 FOR OP/ED): Performed by: NURSE PRACTITIONER

## 2025-02-15 PROCEDURE — 82947 ASSAY GLUCOSE BLOOD QUANT: CPT

## 2025-02-15 PROCEDURE — 80053 COMPREHEN METABOLIC PANEL: CPT | Performed by: INTERNAL MEDICINE

## 2025-02-15 PROCEDURE — 2060000001 HC INTERMEDIATE ICU ROOM DAILY

## 2025-02-15 PROCEDURE — 2500000002 HC RX 250 W HCPCS SELF ADMINISTERED DRUGS (ALT 637 FOR MEDICARE OP, ALT 636 FOR OP/ED): Performed by: INTERNAL MEDICINE

## 2025-02-15 PROCEDURE — 2500000001 HC RX 250 WO HCPCS SELF ADMINISTERED DRUGS (ALT 637 FOR MEDICARE OP): Performed by: NURSE PRACTITIONER

## 2025-02-15 PROCEDURE — 85027 COMPLETE CBC AUTOMATED: CPT | Performed by: INTERNAL MEDICINE

## 2025-02-15 PROCEDURE — 36415 COLL VENOUS BLD VENIPUNCTURE: CPT | Performed by: INTERNAL MEDICINE

## 2025-02-15 PROCEDURE — 2500000002 HC RX 250 W HCPCS SELF ADMINISTERED DRUGS (ALT 637 FOR MEDICARE OP, ALT 636 FOR OP/ED): Performed by: NURSE PRACTITIONER

## 2025-02-15 PROCEDURE — 2500000001 HC RX 250 WO HCPCS SELF ADMINISTERED DRUGS (ALT 637 FOR MEDICARE OP): Performed by: INTERNAL MEDICINE

## 2025-02-15 RX ADMIN — AMLODIPINE BESYLATE 10 MG: 10 TABLET ORAL at 13:53

## 2025-02-15 RX ADMIN — PANTOPRAZOLE SODIUM 40 MG: 40 TABLET, DELAYED RELEASE ORAL at 06:02

## 2025-02-15 RX ADMIN — PANTOPRAZOLE SODIUM 40 MG: 40 TABLET, DELAYED RELEASE ORAL at 18:01

## 2025-02-15 RX ADMIN — URSODIOL 300 MG: 300 CAPSULE ORAL at 20:29

## 2025-02-15 RX ADMIN — POLYETHYLENE GLYCOL 3350 17 G: 17 POWDER, FOR SOLUTION ORAL at 13:53

## 2025-02-15 RX ADMIN — SUCRALFATE 1 G: 1 TABLET ORAL at 20:29

## 2025-02-15 RX ADMIN — PIPERACILLIN SODIUM AND TAZOBACTAM SODIUM 2.25 G: 2; .25 INJECTION, SOLUTION INTRAVENOUS at 13:53

## 2025-02-15 RX ADMIN — CARVEDILOL 12.5 MG: 12.5 TABLET, FILM COATED ORAL at 20:29

## 2025-02-15 RX ADMIN — CARVEDILOL 12.5 MG: 12.5 TABLET, FILM COATED ORAL at 13:53

## 2025-02-15 RX ADMIN — SUCRALFATE 1 G: 1 TABLET ORAL at 13:53

## 2025-02-15 RX ADMIN — INSULIN LISPRO 2 UNITS: 100 INJECTION, SOLUTION INTRAVENOUS; SUBCUTANEOUS at 18:01

## 2025-02-15 RX ADMIN — SUCRALFATE 1 G: 1 TABLET ORAL at 06:02

## 2025-02-15 RX ADMIN — IRON SUCROSE 50 MG: 20 INJECTION, SOLUTION INTRAVENOUS at 13:53

## 2025-02-15 RX ADMIN — SUCRALFATE 1 G: 1 TABLET ORAL at 18:01

## 2025-02-15 RX ADMIN — PIPERACILLIN SODIUM AND TAZOBACTAM SODIUM 2.25 G: 2; .25 INJECTION, SOLUTION INTRAVENOUS at 20:28

## 2025-02-15 RX ADMIN — PARICALCITOL 2 MCG: 2 INJECTION, SOLUTION INTRAVENOUS at 13:53

## 2025-02-15 ASSESSMENT — PAIN - FUNCTIONAL ASSESSMENT
PAIN_FUNCTIONAL_ASSESSMENT: 0-10
PAIN_FUNCTIONAL_ASSESSMENT: NO/DENIES PAIN
PAIN_FUNCTIONAL_ASSESSMENT: 0-10
PAIN_FUNCTIONAL_ASSESSMENT: NO/DENIES PAIN

## 2025-02-15 ASSESSMENT — COGNITIVE AND FUNCTIONAL STATUS - GENERAL
MOVING TO AND FROM BED TO CHAIR: A LITTLE
HELP NEEDED FOR BATHING: A LITTLE
CLIMB 3 TO 5 STEPS WITH RAILING: A LITTLE
DAILY ACTIVITIY SCORE: 24
CLIMB 3 TO 5 STEPS WITH RAILING: A LITTLE
WALKING IN HOSPITAL ROOM: A LITTLE
MOBILITY SCORE: 21
DRESSING REGULAR LOWER BODY CLOTHING: A LITTLE
DAILY ACTIVITIY SCORE: 21
MOBILITY SCORE: 23
TOILETING: A LITTLE

## 2025-02-15 ASSESSMENT — PAIN SCALES - GENERAL
PAINLEVEL_OUTOF10: 0 - NO PAIN

## 2025-02-15 NOTE — PROGRESS NOTES
"    Subjective   On Dialysis  No voice Complaints  Objective          Vitals 24HR  Heart Rate:  [80-95]   Temp:  [36 °C (96.8 °F)-36.5 °C (97.7 °F)]   Resp:  [19]   BP: (158-176)/(75-94)   Height:  [160 cm (5' 2.99\")]   SpO2:  [97 %-100 %]         Intake/Output last 3 Shifts:    Intake/Output Summary (Last 24 hours) at 2/15/2025 1128  Last data filed at 2/15/2025 1100  Gross per 24 hour   Intake 1690 ml   Output 2894 ml   Net -1204 ml       Physical Exam    General appearance; alert pleasant  HEENT; pupils react to light and accommodation  Neck; no JVD no bruit no thyromegaly  No cervical adenopathy  Lungs; clear to auscultation and percussion  Heart; regular rate ;no rubs; no thrills   Abdomen; positive Hernandez's point  Active peristalsis. Tender Both Lower Quadrants  Extremities; 2+ edema  Neurological; no tremors no asterixis       Relevant Results  Results for orders placed or performed during the hospital encounter of 02/11/25 (from the past 24 hours)   POCT GLUCOSE   Result Value Ref Range    POCT Glucose 133 (H) 74 - 99 mg/dL   POCT GLUCOSE   Result Value Ref Range    POCT Glucose 151 (H) 74 - 99 mg/dL   POCT GLUCOSE   Result Value Ref Range    POCT Glucose 99 74 - 99 mg/dL   Comprehensive Metabolic Panel   Result Value Ref Range    Glucose 104 (H) 74 - 99 mg/dL    Sodium 139 136 - 145 mmol/L    Potassium 4.5 3.5 - 5.3 mmol/L    Chloride 106 98 - 107 mmol/L    Bicarbonate 21 21 - 32 mmol/L    Anion Gap 17 10 - 20 mmol/L    Urea Nitrogen 67 (H) 6 - 23 mg/dL    Creatinine 9.21 (H) 0.50 - 1.05 mg/dL    eGFR 5 (L) >60 mL/min/1.73m*2    Calcium 7.0 (L) 8.6 - 10.3 mg/dL    Albumin 2.3 (L) 3.4 - 5.0 g/dL    Alkaline Phosphatase 49 33 - 110 U/L    Total Protein 5.0 (L) 6.4 - 8.2 g/dL    AST 30 9 - 39 U/L    Bilirubin, Total 0.5 0.0 - 1.2 mg/dL    ALT 6 (L) 7 - 45 U/L   CBC   Result Value Ref Range    WBC 8.0 4.4 - 11.3 x10*3/uL    nRBC 0.0 0.0 - 0.0 /100 WBCs    RBC 2.55 (L) 4.00 - 5.20 x10*6/uL    Hemoglobin 7.2 (L) " 12.0 - 16.0 g/dL    Hematocrit 22.3 (L) 36.0 - 46.0 %    MCV 88 80 - 100 fL    MCH 28.2 26.0 - 34.0 pg    MCHC 32.3 32.0 - 36.0 g/dL    RDW 12.8 11.5 - 14.5 %    Platelets 190 150 - 450 x10*3/uL       Assessment/Plan   Acute kidney injury superimposed on chronic kidney disease stage V  Pyocystitis  Enterococcus faecalis infection  History of right ureteral stenosis  Cholecystitis without cholangitis  Anemia of chronic disease and renal failure  Abdominal pain  Anasarca  Hyperuricemia  Secondary hyperparathyroidism of renal origin plan  Plan   Dialysis Monday        Assessment & Plan  Acute renal failure, unspecified acute renal failure type (CMS-HCC)           I spent 30  minutes in the professional and overall care of this patient.      Chinedu Bryant MD

## 2025-02-15 NOTE — CARE PLAN
The clinical goals for the shift include Tolerate hemodialysis      Problem: Pain - Adult  Goal: Verbalizes/displays adequate comfort level or baseline comfort level  Outcome: Progressing

## 2025-02-15 NOTE — PROGRESS NOTES
Yola Barillas is a 54 y.o. female on day 4 of admission presenting with Acute renal failure, unspecified acute renal failure type (CMS-HCC).      Subjective   Seen today went through 2 rounds of dialysis no fever blood pressure little bit on the higher side stable saturations look good she needs 1 more round       Objective     Last Recorded Vitals  /75 (BP Location: Right arm, Patient Position: Lying)   Pulse 91   Temp 36.5 °C (97.7 °F) (Temporal)   Resp 19   Wt 79.4 kg (175 lb)   SpO2 97%   Intake/Output last 3 Shifts:    Intake/Output Summary (Last 24 hours) at 2/15/2025 0930  Last data filed at 2/15/2025 0500  Gross per 24 hour   Intake 1290 ml   Output 2894 ml   Net -1604 ml       Admission Weight  Weight: 79.4 kg (175 lb) (02/11/25 0844)    Daily Weight  02/11/25 : 79.4 kg (175 lb)    Image Results  Invasive vascular procedure  This study is a surgery completed in an invasive cardiovascular space.   Please see OpNote on Notes tab for findings.    Results from last 7 days   Lab Units 02/15/25  0621   WBC AUTO x10*3/uL 8.0   HEMOGLOBIN g/dL 7.2*   HEMATOCRIT % 22.3*   PLATELETS AUTO x10*3/uL 190      Results from last 7 days   Lab Units 02/15/25  0621   SODIUM mmol/L 139   POTASSIUM mmol/L 4.5   CHLORIDE mmol/L 106   CO2 mmol/L 21   BUN mg/dL 67*   CREATININE mg/dL 9.21*   CALCIUM mg/dL 7.0*   PROTEIN TOTAL g/dL 5.0*   BILIRUBIN TOTAL mg/dL 0.5   ALK PHOS U/L 49   ALT U/L 6*   AST U/L 30   GLUCOSE mg/dL 104*        Physical Exam  Lungs are diminished but clear heart has a regular rate and rhythm abdomen is soft is not distended or firm  Relevant Results               Assessment/Plan   This patient currently has cardiac telemetry ordered; if you would like to modify or discontinue the telemetry order, click here to go to the orders activity to modify/discontinue the order.    This patient has a central line   Reason for the central line remaining today?             Assessment & Plan  Acute renal  failure, unspecified acute renal failure type (CMS-HCC)    For now acute renal failure needs a third round of dialysis repeat the CBC and the electrolytes       Acute kidney injury superimposed on chronic kidney disease stage V  Pyocystitis  Enterococcus faecalis infection  History of right ureteral stenosis  Cholecystitis without cholangitis  Anemia of chronic disease and renal failure  Abdominal pain  Anasarca  Hyperuricemia  Secondary hyperparathyroidism of renal origin avril Puri DO

## 2025-02-15 NOTE — NURSING NOTE
House NP  Kadi made aware that pt has been having hematuria but with no pain or any other symptoms. No new orders at this time.

## 2025-02-15 NOTE — PRE-PROCEDURE NOTE
Report from Sending RN:    Report From: Melissa  Recent Surgery of Procedure: No  Baseline Level of Consciousness (LOC): A&OX4  Oxygen Use: No  Type: ra  Diabetic: yes  Last BP Med Given Day of Dialysis: none  Last Pain Med Given: n/a  Lab Tests to be Obtained with Dialysis: No  Blood Transfusion to be Given During Dialysis: No  Available IV Access: Yes  Medications to be Administered During Dialysis: No  Continuous IV Infusion Running: No  Restraints on Currently or in the Last 24 Hours: No  Hand-Off Communication: Stable to come to room  Dialysis Catheter Dressing: Intact  Last Dressing Change: TBD

## 2025-02-15 NOTE — CARE PLAN
The patient's goals for the shift include      The clinical goals for the shift include Maintain safety and comfort      Problem: Pain - Adult  Goal: Verbalizes/displays adequate comfort level or baseline comfort level  Outcome: Progressing     Problem: Safety - Adult  Goal: Free from fall injury  Outcome: Progressing     Problem: Discharge Planning  Goal: Discharge to home or other facility with appropriate resources  Outcome: Progressing     Problem: Chronic Conditions and Co-morbidities  Goal: Patient's chronic conditions and co-morbidity symptoms are monitored and maintained or improved  Outcome: Progressing     Problem: Nutrition  Goal: Nutrient intake appropriate for maintaining nutritional needs  Outcome: Progressing     Problem: Diabetes  Goal: Achieve decreasing blood glucose levels by end of shift  Outcome: Progressing  Goal: Increase stability of blood glucose readings by end of shift  Outcome: Progressing  Goal: Decrease in ketones present in urine by end of shift  Outcome: Progressing  Goal: Maintain electrolyte levels within acceptable range throughout shift  Outcome: Progressing  Goal: Maintain glucose levels >70mg/dl to <250mg/dl throughout shift  Outcome: Progressing  Goal: No changes in neurological exam by end of shift  Outcome: Progressing  Goal: Learn about and adhere to nutrition recommendations by end of shift  Outcome: Progressing  Goal: Vital signs within normal range for age by end of shift  Outcome: Progressing  Goal: Increase self care and/or family involovement by end of shift  Outcome: Progressing  Goal: Receive DSME education by end of shift  Outcome: Progressing     Problem: Pain  Goal: Takes deep breaths with improved pain control throughout the shift  Outcome: Progressing  Goal: Turns in bed with improved pain control throughout the shift  Outcome: Progressing  Goal: Walks with improved pain control throughout the shift  Outcome: Progressing  Goal: Performs ADL's with improved pain  control throughout shift  Outcome: Progressing  Goal: Participates in PT with improved pain control throughout the shift  Outcome: Progressing  Goal: Free from opioid side effects throughout the shift  Outcome: Progressing  Goal: Free from acute confusion related to pain meds throughout the shift  Outcome: Progressing        [facility]

## 2025-02-16 LAB
GLUCOSE BLD MANUAL STRIP-MCNC: 110 MG/DL (ref 74–99)
GLUCOSE BLD MANUAL STRIP-MCNC: 163 MG/DL (ref 74–99)
GLUCOSE BLD MANUAL STRIP-MCNC: 178 MG/DL (ref 74–99)
GLUCOSE BLD MANUAL STRIP-MCNC: 92 MG/DL (ref 74–99)

## 2025-02-16 PROCEDURE — 2060000001 HC INTERMEDIATE ICU ROOM DAILY

## 2025-02-16 PROCEDURE — 2500000004 HC RX 250 GENERAL PHARMACY W/ HCPCS (ALT 636 FOR OP/ED): Performed by: INTERNAL MEDICINE

## 2025-02-16 PROCEDURE — 82947 ASSAY GLUCOSE BLOOD QUANT: CPT

## 2025-02-16 PROCEDURE — 2500000001 HC RX 250 WO HCPCS SELF ADMINISTERED DRUGS (ALT 637 FOR MEDICARE OP): Performed by: NURSE PRACTITIONER

## 2025-02-16 PROCEDURE — 6350000001 HC RX 635 EPOETIN >10,000 UNITS: Performed by: INTERNAL MEDICINE

## 2025-02-16 PROCEDURE — 2500000002 HC RX 250 W HCPCS SELF ADMINISTERED DRUGS (ALT 637 FOR MEDICARE OP, ALT 636 FOR OP/ED): Performed by: NURSE PRACTITIONER

## 2025-02-16 PROCEDURE — 2500000001 HC RX 250 WO HCPCS SELF ADMINISTERED DRUGS (ALT 637 FOR MEDICARE OP): Performed by: INTERNAL MEDICINE

## 2025-02-16 PROCEDURE — 2500000002 HC RX 250 W HCPCS SELF ADMINISTERED DRUGS (ALT 637 FOR MEDICARE OP, ALT 636 FOR OP/ED): Performed by: INTERNAL MEDICINE

## 2025-02-16 RX ORDER — HEPARIN SODIUM 1000 [USP'U]/ML
1000 INJECTION, SOLUTION INTRAVENOUS; SUBCUTANEOUS
Status: DISCONTINUED | OUTPATIENT
Start: 2025-02-17 | End: 2025-02-18

## 2025-02-16 RX ORDER — ALLOPURINOL 100 MG/1
50 TABLET ORAL DAILY
Status: DISCONTINUED | OUTPATIENT
Start: 2025-02-16 | End: 2025-02-19 | Stop reason: HOSPADM

## 2025-02-16 RX ADMIN — INSULIN LISPRO 2 UNITS: 100 INJECTION, SOLUTION INTRAVENOUS; SUBCUTANEOUS at 12:38

## 2025-02-16 RX ADMIN — CARVEDILOL 12.5 MG: 12.5 TABLET, FILM COATED ORAL at 20:35

## 2025-02-16 RX ADMIN — PANTOPRAZOLE SODIUM 40 MG: 40 TABLET, DELAYED RELEASE ORAL at 16:13

## 2025-02-16 RX ADMIN — SUCRALFATE 1 G: 1 TABLET ORAL at 20:35

## 2025-02-16 RX ADMIN — PIPERACILLIN SODIUM AND TAZOBACTAM SODIUM 2.25 G: 2; .25 INJECTION, SOLUTION INTRAVENOUS at 20:35

## 2025-02-16 RX ADMIN — ALLOPURINOL 50 MG: 100 TABLET ORAL at 12:38

## 2025-02-16 RX ADMIN — PANTOPRAZOLE SODIUM 40 MG: 40 TABLET, DELAYED RELEASE ORAL at 06:06

## 2025-02-16 RX ADMIN — CARVEDILOL 12.5 MG: 12.5 TABLET, FILM COATED ORAL at 08:44

## 2025-02-16 RX ADMIN — AMLODIPINE BESYLATE 10 MG: 10 TABLET ORAL at 08:44

## 2025-02-16 RX ADMIN — ERYTHROPOIETIN 20000 UNITS: 20000 INJECTION, SOLUTION INTRAVENOUS; SUBCUTANEOUS at 08:44

## 2025-02-16 RX ADMIN — SUCRALFATE 1 G: 1 TABLET ORAL at 12:38

## 2025-02-16 RX ADMIN — SUCRALFATE 1 G: 1 TABLET ORAL at 06:06

## 2025-02-16 RX ADMIN — SUCRALFATE 1 G: 1 TABLET ORAL at 16:13

## 2025-02-16 RX ADMIN — PIPERACILLIN SODIUM AND TAZOBACTAM SODIUM 2.25 G: 2; .25 INJECTION, SOLUTION INTRAVENOUS at 08:44

## 2025-02-16 RX ADMIN — URSODIOL 300 MG: 300 CAPSULE ORAL at 20:35

## 2025-02-16 ASSESSMENT — COGNITIVE AND FUNCTIONAL STATUS - GENERAL
CLIMB 3 TO 5 STEPS WITH RAILING: A LITTLE
CLIMB 3 TO 5 STEPS WITH RAILING: A LITTLE
DAILY ACTIVITIY SCORE: 22
DAILY ACTIVITIY SCORE: 22
HELP NEEDED FOR BATHING: A LITTLE
DRESSING REGULAR LOWER BODY CLOTHING: A LITTLE
MOBILITY SCORE: 23
HELP NEEDED FOR BATHING: A LITTLE
DRESSING REGULAR LOWER BODY CLOTHING: A LITTLE
MOBILITY SCORE: 23

## 2025-02-16 ASSESSMENT — PAIN - FUNCTIONAL ASSESSMENT
PAIN_FUNCTIONAL_ASSESSMENT: 0-10
PAIN_FUNCTIONAL_ASSESSMENT: 0-10

## 2025-02-16 ASSESSMENT — PAIN SCALES - GENERAL
PAINLEVEL_OUTOF10: 0 - NO PAIN

## 2025-02-16 NOTE — CARE PLAN
The patient's goals for the shift include  rest and comfort    The clinical goals for the shift include pt to remain HDS/safe throughout the shift        Problem: Pain - Adult  Goal: Verbalizes/displays adequate comfort level or baseline comfort level  Outcome: Progressing     Problem: Safety - Adult  Goal: Free from fall injury  Outcome: Progressing     Problem: Chronic Conditions and Co-morbidities  Goal: Patient's chronic conditions and co-morbidity symptoms are monitored and maintained or improved  Outcome: Progressing

## 2025-02-16 NOTE — PROGRESS NOTES
Yola Barillas is a 54 y.o. female on day 5 of admission presenting with Acute renal failure, unspecified acute renal failure type (CMS-HCC).      Subjective   Seen today going through dialysis she will have it 1 more time tomorrow tomorrow should be able to be discharged       Objective     Last Recorded Vitals  /70 (BP Location: Left arm, Patient Position: Lying)   Pulse 91   Temp 36.6 °C (97.9 °F) (Temporal)   Resp 18   Wt 79.4 kg (175 lb)   SpO2 94%   Intake/Output last 3 Shifts:    Intake/Output Summary (Last 24 hours) at 2/16/2025 1005  Last data filed at 2/16/2025 0844  Gross per 24 hour   Intake 1150 ml   Output 2600 ml   Net -1450 ml       Admission Weight  Weight: 79.4 kg (175 lb) (02/11/25 0844)    Daily Weight  02/11/25 : 79.4 kg (175 lb)    Image Results  Invasive vascular procedure  This study is a surgery completed in an invasive cardiovascular space.   Please see OpNote on Notes tab for findings.    Results from last 7 days   Lab Units 02/15/25  0621   WBC AUTO x10*3/uL 8.0   HEMOGLOBIN g/dL 7.2*   HEMATOCRIT % 22.3*   PLATELETS AUTO x10*3/uL 190      Results from last 7 days   Lab Units 02/15/25  0621   SODIUM mmol/L 139   POTASSIUM mmol/L 4.5   CHLORIDE mmol/L 106   CO2 mmol/L 21   BUN mg/dL 67*   CREATININE mg/dL 9.21*   GLUCOSE mg/dL 104*   CALCIUM mg/dL 7.0*      In the review of systems is weakness nausea vomiting has resolved no vomiting or diarrhea    Physical Exam  Awake and alert and oriented x 3 lungs are clear posteriorly heart has a regular rate and rhythm no rubs abdomen is soft nontender no organomegaly present.  No clubbing or peripheral cyanosis nailbeds are pink no palmar erythema.  Pupils are equal and midsize no icterus no pallor  Relevant Results               Assessment/Plan   This patient currently has cardiac telemetry ordered; if you would like to modify or discontinue the telemetry order, click here to go to the orders activity to modify/discontinue the  order.    This patient has a central line   Reason for the central line remaining today?             Assessment & Plan  Acute renal failure, unspecified acute renal failure type (CMS-HCC)    Urinary tract infection with Enterococcus acute kidney injury on chronic kidney disease requiring dialysis she will get another round of dialysis tomorrow      I will continue to monitor her electrolytes I will replace her potassium monitor her serum sodium and phosphorus she has hyperuricemia as well secondary hyperparathyroidism secondary to the chronic kidney disease      Plan is for dialysis tomorrow on Monday and then she can be discharged home to follow-up on a regular course I will continue with her other chronic medications and I will review everything tomorrow continue with all the present care and therapy thank you              Rai Puri,

## 2025-02-16 NOTE — CARE PLAN
The patient's goals for the shift include      The clinical goals for the shift include pt to remain HDS/safe throughout the shift      Problem: Pain - Adult  Goal: Verbalizes/displays adequate comfort level or baseline comfort level  Outcome: Progressing     Problem: Safety - Adult  Goal: Free from fall injury  Outcome: Progressing     Problem: Discharge Planning  Goal: Discharge to home or other facility with appropriate resources  Outcome: Progressing     Problem: Chronic Conditions and Co-morbidities  Goal: Patient's chronic conditions and co-morbidity symptoms are monitored and maintained or improved  Outcome: Progressing     Problem: Nutrition  Goal: Nutrient intake appropriate for maintaining nutritional needs  Outcome: Progressing     Problem: Diabetes  Goal: Achieve decreasing blood glucose levels by end of shift  Outcome: Progressing  Goal: Increase stability of blood glucose readings by end of shift  Outcome: Progressing  Goal: Decrease in ketones present in urine by end of shift  Outcome: Progressing  Goal: Maintain electrolyte levels within acceptable range throughout shift  Outcome: Progressing  Goal: Maintain glucose levels >70mg/dl to <250mg/dl throughout shift  Outcome: Progressing  Goal: No changes in neurological exam by end of shift  Outcome: Progressing  Goal: Learn about and adhere to nutrition recommendations by end of shift  Outcome: Progressing  Goal: Vital signs within normal range for age by end of shift  Outcome: Progressing  Goal: Increase self care and/or family involovement by end of shift  Outcome: Progressing  Goal: Receive DSME education by end of shift  Outcome: Progressing     Problem: Pain  Goal: Takes deep breaths with improved pain control throughout the shift  Outcome: Progressing  Goal: Turns in bed with improved pain control throughout the shift  Outcome: Progressing  Goal: Walks with improved pain control throughout the shift  Outcome: Progressing  Goal: Performs ADL's with  improved pain control throughout shift  Outcome: Progressing  Goal: Participates in PT with improved pain control throughout the shift  Outcome: Progressing  Goal: Free from opioid side effects throughout the shift  Outcome: Progressing  Goal: Free from acute confusion related to pain meds throughout the shift  Outcome: Progressing

## 2025-02-16 NOTE — PROGRESS NOTES
Subjective   Patient refers no further abdominal pain denies dyspnea or chest pain.  Due for dialysis tomorrow  Objective          Vitals 24HR  Heart Rate:  []   Temp:  [36.2 °C (97.2 °F)-36.7 °C (98.1 °F)]   Resp:  [17-19]   BP: (134-176)/(70-90)   SpO2:  [94 %-100 %]         Intake/Output last 3 Shifts:    Intake/Output Summary (Last 24 hours) at 2/16/2025 1023  Last data filed at 2/16/2025 0844  Gross per 24 hour   Intake 1150 ml   Output 2600 ml   Net -1450 ml       Physical Exam    General appearance; alert pleasant  HEENT; pupils react to light and accommodation  Neck; no JVD no bruit no thyromegaly  No cervical adenopathy  Lungs; clear to auscultation and percussion  Heart; regular rate ;no rubs; no thrills   Abdomen; positive Hernandez's point  Active peristalsis. Tender Both Lower Quadrants  Extremities; 2+ edema  Neurological; no tremors no asterixis       Relevant Results  Results for orders placed or performed during the hospital encounter of 02/11/25 (from the past 24 hours)   POCT GLUCOSE   Result Value Ref Range    POCT Glucose 151 (H) 74 - 99 mg/dL   POCT GLUCOSE   Result Value Ref Range    POCT Glucose 151 (H) 74 - 99 mg/dL   POCT GLUCOSE   Result Value Ref Range    POCT Glucose 110 (H) 74 - 99 mg/dL       Assessment/Plan   Acute kidney injury superimposed on chronic kidney disease stage V  Pyocystitis  Enterococcus faecalis infection  History of right ureteral stenosis  Cholecystitis without cholangitis  Anemia of chronic disease and renal failure  Abdominal pain  Anasarca  Hyperuricemia  Secondary hyperparathyroidism of renal origin plan  Plan   Dialysis Monday  Low dose allopurinol        Assessment & Plan  Acute renal failure, unspecified acute renal failure type (CMS-HCC)           I spent 30  minutes in the professional and overall care of this patient.      Chinedu Bryant MD

## 2025-02-17 ENCOUNTER — APPOINTMENT (OUTPATIENT)
Dept: DIALYSIS | Facility: HOSPITAL | Age: 55
End: 2025-02-17
Payer: MEDICARE

## 2025-02-17 VITALS
DIASTOLIC BLOOD PRESSURE: 66 MMHG | TEMPERATURE: 97.5 F | OXYGEN SATURATION: 99 % | HEART RATE: 80 BPM | BODY MASS INDEX: 31.01 KG/M2 | SYSTOLIC BLOOD PRESSURE: 134 MMHG | WEIGHT: 175 LBS | RESPIRATION RATE: 18 BRPM | HEIGHT: 63 IN

## 2025-02-17 LAB
ALBUMIN SERPL BCP-MCNC: 2.3 G/DL (ref 3.4–5)
ALP SERPL-CCNC: 57 U/L (ref 33–110)
ALT SERPL W P-5'-P-CCNC: 6 U/L (ref 7–45)
ANION GAP SERPL CALC-SCNC: 9 MMOL/L (ref 10–20)
AST SERPL W P-5'-P-CCNC: 32 U/L (ref 9–39)
ATRIAL RATE: 90 BPM
BILIRUB SERPL-MCNC: 0.4 MG/DL (ref 0–1.2)
BUN SERPL-MCNC: 49 MG/DL (ref 6–23)
CALCIUM SERPL-MCNC: 7.1 MG/DL (ref 8.6–10.3)
CHLORIDE SERPL-SCNC: 104 MMOL/L (ref 98–107)
CO2 SERPL-SCNC: 29 MMOL/L (ref 21–32)
CREAT SERPL-MCNC: 7.48 MG/DL (ref 0.5–1.05)
EGFRCR SERPLBLD CKD-EPI 2021: 6 ML/MIN/1.73M*2
ERYTHROCYTE [DISTWIDTH] IN BLOOD BY AUTOMATED COUNT: 12.5 % (ref 11.5–14.5)
GLUCOSE BLD MANUAL STRIP-MCNC: 137 MG/DL (ref 74–99)
GLUCOSE BLD MANUAL STRIP-MCNC: 169 MG/DL (ref 74–99)
GLUCOSE SERPL-MCNC: 84 MG/DL (ref 74–99)
HCT VFR BLD AUTO: 21.2 % (ref 36–46)
HGB BLD-MCNC: 7 G/DL (ref 12–16)
MCH RBC QN AUTO: 29 PG (ref 26–34)
MCHC RBC AUTO-ENTMCNC: 33 G/DL (ref 32–36)
MCV RBC AUTO: 88 FL (ref 80–100)
NRBC BLD-RTO: 0 /100 WBCS (ref 0–0)
P AXIS: 62 DEGREES
P OFFSET: 198 MS
P ONSET: 147 MS
PLATELET # BLD AUTO: 146 X10*3/UL (ref 150–450)
POTASSIUM SERPL-SCNC: 3.9 MMOL/L (ref 3.5–5.3)
PR INTERVAL: 132 MS
PROT SERPL-MCNC: 5.2 G/DL (ref 6.4–8.2)
Q ONSET: 213 MS
QRS COUNT: 15 BEATS
QRS DURATION: 78 MS
QT INTERVAL: 372 MS
QTC CALCULATION(BAZETT): 455 MS
QTC FREDERICIA: 425 MS
R AXIS: -45 DEGREES
RBC # BLD AUTO: 2.41 X10*6/UL (ref 4–5.2)
SODIUM SERPL-SCNC: 138 MMOL/L (ref 136–145)
T AXIS: 57 DEGREES
T OFFSET: 399 MS
VENTRICULAR RATE: 90 BPM
WBC # BLD AUTO: 6.7 X10*3/UL (ref 4.4–11.3)

## 2025-02-17 PROCEDURE — 2500000004 HC RX 250 GENERAL PHARMACY W/ HCPCS (ALT 636 FOR OP/ED): Performed by: INTERNAL MEDICINE

## 2025-02-17 PROCEDURE — 2060000001 HC INTERMEDIATE ICU ROOM DAILY

## 2025-02-17 PROCEDURE — 2500000001 HC RX 250 WO HCPCS SELF ADMINISTERED DRUGS (ALT 637 FOR MEDICARE OP): Performed by: INTERNAL MEDICINE

## 2025-02-17 PROCEDURE — 8010000001 HC DIALYSIS - HEMODIALYSIS PER DAY

## 2025-02-17 PROCEDURE — 2500000001 HC RX 250 WO HCPCS SELF ADMINISTERED DRUGS (ALT 637 FOR MEDICARE OP): Performed by: NURSE PRACTITIONER

## 2025-02-17 PROCEDURE — 2500000002 HC RX 250 W HCPCS SELF ADMINISTERED DRUGS (ALT 637 FOR MEDICARE OP, ALT 636 FOR OP/ED): Performed by: INTERNAL MEDICINE

## 2025-02-17 PROCEDURE — 36415 COLL VENOUS BLD VENIPUNCTURE: CPT | Performed by: INTERNAL MEDICINE

## 2025-02-17 PROCEDURE — 82947 ASSAY GLUCOSE BLOOD QUANT: CPT

## 2025-02-17 PROCEDURE — 36430 TRANSFUSION BLD/BLD COMPNT: CPT

## 2025-02-17 PROCEDURE — 80053 COMPREHEN METABOLIC PANEL: CPT | Performed by: INTERNAL MEDICINE

## 2025-02-17 PROCEDURE — 85027 COMPLETE CBC AUTOMATED: CPT | Performed by: INTERNAL MEDICINE

## 2025-02-17 RX ADMIN — SUCRALFATE 1 G: 1 TABLET ORAL at 20:01

## 2025-02-17 RX ADMIN — PIPERACILLIN SODIUM AND TAZOBACTAM SODIUM 2.25 G: 2; .25 INJECTION, SOLUTION INTRAVENOUS at 09:12

## 2025-02-17 RX ADMIN — PARICALCITOL 2 MCG: 2 INJECTION, SOLUTION INTRAVENOUS at 20:02

## 2025-02-17 RX ADMIN — ALLOPURINOL 50 MG: 100 TABLET ORAL at 09:11

## 2025-02-17 RX ADMIN — AMLODIPINE BESYLATE 10 MG: 10 TABLET ORAL at 09:12

## 2025-02-17 RX ADMIN — SUCRALFATE 1 G: 1 TABLET ORAL at 06:00

## 2025-02-17 RX ADMIN — HEPARIN SODIUM 1000 UNITS: 1000 INJECTION, SOLUTION INTRAVENOUS; SUBCUTANEOUS at 15:25

## 2025-02-17 RX ADMIN — URSODIOL 300 MG: 300 CAPSULE ORAL at 20:02

## 2025-02-17 RX ADMIN — PANTOPRAZOLE SODIUM 40 MG: 40 TABLET, DELAYED RELEASE ORAL at 06:00

## 2025-02-17 RX ADMIN — PIPERACILLIN SODIUM AND TAZOBACTAM SODIUM 2.25 G: 2; .25 INJECTION, SOLUTION INTRAVENOUS at 20:03

## 2025-02-17 RX ADMIN — CARVEDILOL 12.5 MG: 12.5 TABLET, FILM COATED ORAL at 20:11

## 2025-02-17 RX ADMIN — IRON SUCROSE 50 MG: 20 INJECTION, SOLUTION INTRAVENOUS at 20:02

## 2025-02-17 RX ADMIN — CARVEDILOL 12.5 MG: 12.5 TABLET, FILM COATED ORAL at 09:12

## 2025-02-17 ASSESSMENT — COGNITIVE AND FUNCTIONAL STATUS - GENERAL
MOBILITY SCORE: 23
DRESSING REGULAR LOWER BODY CLOTHING: A LITTLE
DAILY ACTIVITIY SCORE: 23
DAILY ACTIVITIY SCORE: 22
DRESSING REGULAR LOWER BODY CLOTHING: A LITTLE
CLIMB 3 TO 5 STEPS WITH RAILING: A LITTLE
MOBILITY SCORE: 23
HELP NEEDED FOR BATHING: A LITTLE
CLIMB 3 TO 5 STEPS WITH RAILING: A LITTLE

## 2025-02-17 ASSESSMENT — PAIN SCALES - GENERAL
PAINLEVEL_OUTOF10: 0 - NO PAIN

## 2025-02-17 ASSESSMENT — PAIN - FUNCTIONAL ASSESSMENT
PAIN_FUNCTIONAL_ASSESSMENT: 0-10
PAIN_FUNCTIONAL_ASSESSMENT: NO/DENIES PAIN

## 2025-02-17 NOTE — PROGRESS NOTES
Yola Barillas is a 54 y.o. female on day 6 of admission presenting with Acute renal failure, unspecified acute renal failure type (CMS-HCC).      Subjective   Seen today she has had all of her rounds of dialysis however she needs a chair time as an outpatient and she is doing well stable so far       Objective     Last Recorded Vitals  /63 (BP Location: Left arm, Patient Position: Lying)   Pulse 86   Temp 36.4 °C (97.5 °F) (Temporal)   Resp 18   Wt 79.4 kg (175 lb)   SpO2 100%   Intake/Output last 3 Shifts:    Intake/Output Summary (Last 24 hours) at 2/17/2025 1628  Last data filed at 2/17/2025 1440  Gross per 24 hour   Intake 650 ml   Output --   Net 650 ml       Admission Weight  Weight: 79.4 kg (175 lb) (02/11/25 0844)    Daily Weight  02/11/25 : 79.4 kg (175 lb)    Image Results  Electrocardiogram, 12-lead PRN ACS symptoms  Normal sinus rhythm  Left anterior fascicular block  Abnormal ECG  When compared with ECG of 28-JAN-2025 21:16,  PREVIOUS ECG IS PRESENT      Physical Exam    Relevant Results               Assessment/Plan   This patient currently has cardiac telemetry ordered; if you would like to modify or discontinue the telemetry order, click here to go to the orders activity to modify/discontinue the order.    This patient has a central line   Reason for the central line remaining today?             Assessment & Plan  Acute renal failure, unspecified acute renal failure type (CMS-HCC)    So far she is just waiting for a time we will continue and monitor her closely and then when she gets a chair time I will go ahead and discharge her              Rai Puri DO

## 2025-02-17 NOTE — CARE PLAN
The patient's goals for the shift include      The clinical goals for the shift include Patient will remain safe throughout the shift    Over the shift, the patient will be comfortable. Pain free and refrain from further complications

## 2025-02-17 NOTE — CARE PLAN
The patient's goals for the shift include      The clinical goals for the shift include patient will remain comfortable and refrain from further complications    Over the shift, the patient will be free of pain and refrain from further complications

## 2025-02-17 NOTE — PRE-PROCEDURE NOTE
Report from Sending RN:    Report From: Erika  Recent Surgery of Procedure: No  Baseline Level of Consciousness (LOC): A&OX4  Oxygen Use: No  Type: n/a  Diabetic: Yes  Last BP Med Given Day of Dialysis: none  Last Pain Med Given: n/a  Lab Tests to be Obtained with Dialysis: No  Blood Transfusion to be Given During Dialysis: No  Available IV Access: Yes  Medications to be Administered During Dialysis: No  Continuous IV Infusion Running: No  Restraints on Currently or in the Last 24 Hours: No  Hand-Off Communication: Stable to come to room  Dialysis Catheter Dressing: Intact  Last Dressing Change: TBD

## 2025-02-17 NOTE — PROGRESS NOTES
Subjective   Patient denies any voiced complaints.  States she is feeling better    Awaiting dialysis today  Objective          Vitals 24HR  Heart Rate:  [80-95]   Temp:  [36.2 °C (97.2 °F)-37.1 °C (98.8 °F)]   Resp:  [17-19]   BP: (134-155)/(66-76)   SpO2:  [96 %-100 %]         Intake/Output last 3 Shifts:    Intake/Output Summary (Last 24 hours) at 2/17/2025 1100  Last data filed at 2/16/2025 2244  Gross per 24 hour   Intake 50 ml   Output --   Net 50 ml       Physical Exam    General appearance; alert pleasant  HEENT; pupils react to light and accommodation  Neck; no JVD no bruit no thyromegaly  No cervical adenopathy  Lungs; clear to auscultation and percussion  Heart; regular rate ;no rubs; no thrills   Abdomen; positive Hernandez's point  Active peristalsis. Tender Both Lower Quadrants  Extremities; 2+ edema  Neurological; no tremors no asterixis       Relevant Results  Results for orders placed or performed during the hospital encounter of 02/11/25 (from the past 24 hours)   POCT GLUCOSE   Result Value Ref Range    POCT Glucose 163 (H) 74 - 99 mg/dL   POCT GLUCOSE   Result Value Ref Range    POCT Glucose 92 74 - 99 mg/dL   POCT GLUCOSE   Result Value Ref Range    POCT Glucose 178 (H) 74 - 99 mg/dL   Comprehensive Metabolic Panel   Result Value Ref Range    Glucose 84 74 - 99 mg/dL    Sodium 138 136 - 145 mmol/L    Potassium 3.9 3.5 - 5.3 mmol/L    Chloride 104 98 - 107 mmol/L    Bicarbonate 29 21 - 32 mmol/L    Anion Gap 9 (L) 10 - 20 mmol/L    Urea Nitrogen 49 (H) 6 - 23 mg/dL    Creatinine 7.48 (H) 0.50 - 1.05 mg/dL    eGFR 6 (L) >60 mL/min/1.73m*2    Calcium 7.1 (L) 8.6 - 10.3 mg/dL    Albumin 2.3 (L) 3.4 - 5.0 g/dL    Alkaline Phosphatase 57 33 - 110 U/L    Total Protein 5.2 (L) 6.4 - 8.2 g/dL    AST 32 9 - 39 U/L    Bilirubin, Total 0.4 0.0 - 1.2 mg/dL    ALT 6 (L) 7 - 45 U/L   CBC   Result Value Ref Range    WBC 6.7 4.4 - 11.3 x10*3/uL    nRBC 0.0 0.0 - 0.0 /100 WBCs    RBC 2.41 (L) 4.00 - 5.20  x10*6/uL    Hemoglobin 7.0 (L) 12.0 - 16.0 g/dL    Hematocrit 21.2 (L) 36.0 - 46.0 %    MCV 88 80 - 100 fL    MCH 29.0 26.0 - 34.0 pg    MCHC 33.0 32.0 - 36.0 g/dL    RDW 12.5 11.5 - 14.5 %    Platelets 146 (L) 150 - 450 x10*3/uL       Assessment/Plan   Acute kidney injury superimposed on chronic kidney disease stage V  Pyocystitis  Enterococcus faecalis infection  History of right ureteral stenosis  Cholecystitis without cholangitis  Anemia of chronic disease and renal failure  Abdominal pain  Anasarca  Hyperuricemia  Secondary hyperparathyroidism of renal origin plan  Plan  Continue to monitor renal chemistries  Okay for discharge once there is a chair available for her to have outpatient dialysis      Assessment & Plan  Acute renal failure, unspecified acute renal failure type (CMS-HCC)           I spent 30  minutes in the professional and overall care of this patient.      Chinedu Bryant MD

## 2025-02-17 NOTE — POST-PROCEDURE NOTE
Notes/Events during Treatment:    Report to Receiving RN:    Report To: CAROLINE James  Time Report Called:  1800  Hand-Off Communication to Receiving RN: Tx tolerated well. VSS.  Complications During Treatment: No  Ultrafiltration Treatment: No  Medications Administered During Dialysis: No  Blood Products Administered During Dialysis: No  Labs Sent During Dialysis: No  Heparin Drip Rate Changes: No  Sending BP:  156/84, 85  Dialysis Catheter Dressing Changed: No  Significant Events/Interventions: N/A  Provider Contacted/Time/Response/Orders:  N/A  HD Orders Followed: Yes    Tx Initiated by/Time: 1440Naman RN  Tx Terminated by/Time: 1754Naman RN  Fluid Removed: 2 Liters    Electronic Signatures:       Authored: CAROLINE Solano    Last Updated: 5:58 PM by ALBIN LUTZ

## 2025-02-17 NOTE — PROGRESS NOTES
Spoke with nursing, patient to have dialysis today. Awaiting outpatient chair time through [facility]. Bedside nurse updated. Patient updated at bedside.

## 2025-02-18 LAB
GLUCOSE BLD MANUAL STRIP-MCNC: 126 MG/DL (ref 74–99)
GLUCOSE BLD MANUAL STRIP-MCNC: 145 MG/DL (ref 74–99)
GLUCOSE BLD MANUAL STRIP-MCNC: 152 MG/DL (ref 74–99)
GLUCOSE BLD MANUAL STRIP-MCNC: 89 MG/DL (ref 74–99)

## 2025-02-18 PROCEDURE — 2500000004 HC RX 250 GENERAL PHARMACY W/ HCPCS (ALT 636 FOR OP/ED): Performed by: INTERNAL MEDICINE

## 2025-02-18 PROCEDURE — 2060000001 HC INTERMEDIATE ICU ROOM DAILY

## 2025-02-18 PROCEDURE — 2500000002 HC RX 250 W HCPCS SELF ADMINISTERED DRUGS (ALT 637 FOR MEDICARE OP, ALT 636 FOR OP/ED): Performed by: INTERNAL MEDICINE

## 2025-02-18 PROCEDURE — 2500000001 HC RX 250 WO HCPCS SELF ADMINISTERED DRUGS (ALT 637 FOR MEDICARE OP): Performed by: INTERNAL MEDICINE

## 2025-02-18 PROCEDURE — 2500000002 HC RX 250 W HCPCS SELF ADMINISTERED DRUGS (ALT 637 FOR MEDICARE OP, ALT 636 FOR OP/ED): Performed by: NURSE PRACTITIONER

## 2025-02-18 PROCEDURE — 2500000001 HC RX 250 WO HCPCS SELF ADMINISTERED DRUGS (ALT 637 FOR MEDICARE OP): Performed by: NURSE PRACTITIONER

## 2025-02-18 PROCEDURE — 82947 ASSAY GLUCOSE BLOOD QUANT: CPT

## 2025-02-18 RX ORDER — HEPARIN SODIUM 1000 [USP'U]/ML
2400 INJECTION, SOLUTION INTRAVENOUS; SUBCUTANEOUS
Status: DISCONTINUED | OUTPATIENT
Start: 2025-02-19 | End: 2025-02-19 | Stop reason: HOSPADM

## 2025-02-18 RX ORDER — HEPARIN SODIUM 1000 [USP'U]/ML
2200 INJECTION, SOLUTION INTRAVENOUS; SUBCUTANEOUS
Status: DISCONTINUED | OUTPATIENT
Start: 2025-02-19 | End: 2025-02-19 | Stop reason: HOSPADM

## 2025-02-18 RX ORDER — HEPARIN SODIUM 1000 [USP'U]/ML
1000 INJECTION, SOLUTION INTRAVENOUS; SUBCUTANEOUS
Status: DISCONTINUED | OUTPATIENT
Start: 2025-02-19 | End: 2025-02-19 | Stop reason: HOSPADM

## 2025-02-18 RX ADMIN — CARVEDILOL 12.5 MG: 12.5 TABLET, FILM COATED ORAL at 20:40

## 2025-02-18 RX ADMIN — SUCRALFATE 1 G: 1 TABLET ORAL at 16:18

## 2025-02-18 RX ADMIN — PIPERACILLIN SODIUM AND TAZOBACTAM SODIUM 2.25 G: 2; .25 INJECTION, SOLUTION INTRAVENOUS at 08:51

## 2025-02-18 RX ADMIN — AMLODIPINE BESYLATE 10 MG: 10 TABLET ORAL at 08:49

## 2025-02-18 RX ADMIN — INSULIN LISPRO 2 UNITS: 100 INJECTION, SOLUTION INTRAVENOUS; SUBCUTANEOUS at 16:54

## 2025-02-18 RX ADMIN — SUCRALFATE 1 G: 1 TABLET ORAL at 12:21

## 2025-02-18 RX ADMIN — PIPERACILLIN SODIUM AND TAZOBACTAM SODIUM 2.25 G: 2; .25 INJECTION, SOLUTION INTRAVENOUS at 20:41

## 2025-02-18 RX ADMIN — SUCRALFATE 1 G: 1 TABLET ORAL at 06:30

## 2025-02-18 RX ADMIN — SUCRALFATE 1 G: 1 TABLET ORAL at 20:40

## 2025-02-18 RX ADMIN — ALLOPURINOL 50 MG: 100 TABLET ORAL at 08:49

## 2025-02-18 RX ADMIN — URSODIOL 300 MG: 300 CAPSULE ORAL at 20:40

## 2025-02-18 RX ADMIN — PANTOPRAZOLE SODIUM 40 MG: 40 TABLET, DELAYED RELEASE ORAL at 16:18

## 2025-02-18 RX ADMIN — CARVEDILOL 12.5 MG: 12.5 TABLET, FILM COATED ORAL at 08:49

## 2025-02-18 RX ADMIN — PANTOPRAZOLE SODIUM 40 MG: 40 TABLET, DELAYED RELEASE ORAL at 06:30

## 2025-02-18 ASSESSMENT — COGNITIVE AND FUNCTIONAL STATUS - GENERAL
MOBILITY SCORE: 23
CLIMB 3 TO 5 STEPS WITH RAILING: A LITTLE
MOBILITY SCORE: 24
DAILY ACTIVITIY SCORE: 24
DAILY ACTIVITIY SCORE: 24

## 2025-02-18 ASSESSMENT — PAIN SCALES - GENERAL
PAINLEVEL_OUTOF10: 0 - NO PAIN
PAINLEVEL_OUTOF10: 0 - NO PAIN

## 2025-02-18 ASSESSMENT — PAIN - FUNCTIONAL ASSESSMENT: PAIN_FUNCTIONAL_ASSESSMENT: 0-10

## 2025-02-18 NOTE — PROGRESS NOTES
Beth spoke with [facility] admissions and they are confirming pt is able to do the Tues, Thur, Sat chair time of 11:00am at [facility].  The treatment start date would be Sat. 2/22.  Sw notified pt and she is ok with that schedule.  Pts brother will provide transportation to her first treatment session.  Sw is waiting to hear back that everything is confirmed for the above treatment start date.  Beth did ask if pt is able to start on Thursday 2/20.  Beth will keep pt and following MD updated.     12pm- Spoke with [facility] and pt's treatment start date of 2/22 is confirmed.  [facility] to let sw know today if they can moved the first treatment start date to 2/20.  Sw to keep pt and following MD updated.    1:00pm Alex is able to start treatment on 2/20 as requested. Beth notified pt that she will need to be at her first treatment session on 2/20 at 10:15am. Beth provided pt with the address, start time and contact number of the first treatment session.   Pt is setting up transport for Thursday through her insurance.  Pt's brother will be able to assist with transport on Saturdays.  Sw answered all further questions pt had.  Pt will DC home tomorrow after her dialysis session. Pt will need to utilize the uber to get home and Beth notified pt's nurse of the transport need. Beth will continue to provide support and services as needed.

## 2025-02-18 NOTE — CARE PLAN
The patient's goals for the shift include  remain stable.    The clinical goals for the shift include Maintain safety and comfort      Problem: Pain - Adult  Goal: Verbalizes/displays adequate comfort level or baseline comfort level  Outcome: Progressing  Flowsheets (Taken 2/17/2025 1937)  Verbalizes/displays adequate comfort level or baseline comfort level:   Encourage patient to monitor pain and request assistance   Assess pain using appropriate pain scale   Consider cultural and social influences on pain and pain management     Problem: Safety - Adult  Goal: Free from fall injury  Outcome: Progressing     Problem: Discharge Planning  Goal: Discharge to home or other facility with appropriate resources  Outcome: Progressing     Problem: Skin  Goal: Participates in plan/prevention/treatment measures  Outcome: Progressing  Flowsheets (Taken 2/17/2025 1937)  Participates in plan/prevention/treatment measures: Elevate heels  Goal: Promote skin healing  Outcome: Progressing  Flowsheets (Taken 2/17/2025 1937)  Promote skin healing: Turn/reposition every 2 hours/use positioning/transfer devices

## 2025-02-18 NOTE — PROGRESS NOTES
Yola Barillas is a 54 y.o. female on day 7 of admission presenting with Acute renal failure, unspecified acute renal failure type (CMS-HCC).      Subjective   Seen today going for dialysis tomorrow here and then afterwards I can discharge her and then she has a chair time on Thursday for dialysis       Objective     Last Recorded Vitals  /78 (BP Location: Right arm, Patient Position: Lying)   Pulse 82   Temp 36.2 °C (97.2 °F) (Temporal)   Resp 16   Wt 79.4 kg (175 lb)   SpO2 97%   Intake/Output last 3 Shifts:    Intake/Output Summary (Last 24 hours) at 2/18/2025 1607  Last data filed at 2/18/2025 0940  Gross per 24 hour   Intake 750 ml   Output 4400 ml   Net -3650 ml       Admission Weight  Weight: 79.4 kg (175 lb) (02/11/25 0844)    Daily Weight  02/11/25 : 79.4 kg (175 lb)    Image Results  Electrocardiogram, 12-lead PRN ACS symptoms  Normal sinus rhythm  Left anterior fascicular block  Abnormal ECG  When compared with ECG of 28-JAN-2025 21:16,  PREVIOUS ECG IS PRESENT      Physical Exam    Relevant Results               Assessment/Plan   This patient currently has cardiac telemetry ordered; if you would like to modify or discontinue the telemetry order, click here to go to the orders activity to modify/discontinue the order.    This patient has a central line   Reason for the central line remaining today?             Assessment & Plan  Acute renal failure, unspecified acute renal failure type (CMS-HCC)    She will need chronic dialysis and apparently she has a time in a place for Thursday dialysis tomorrow continue the same present care and therapy and management thank you              Rai Puri DO

## 2025-02-18 NOTE — PROGRESS NOTES
"    Subjective   [facility] /Thursday[facility]Satur[facility].  She refers much improvement on her symptoms abdominal pain has resolved  Objective          Vitals 24HR  Heart Rate:  [83-99]   Temp:  [36.2 °C (97.2 °F)-36.8 °C (98.2 °F)]   Resp:  [15-18]   BP: (131-174)/(63-83)   Height:  [160 cm (5' 2.99\")]   SpO2:  [98 %-100 %]         Intake/Output last 3 Shifts:    Intake/Output Summary (Last 24 hours) at 2/18/2025 1117  Last data filed at 2/18/2025 0940  Gross per 24 hour   Intake 1350 ml   Output 4400 ml   Net -3050 ml       Physical Exam    General appearance; alert pleasant  HEENT; pupils react to light and accommodation  Neck; no JVD no bruit no thyromegaly  No cervical adenopathy  Lungs; clear to auscultation and percussion  Heart; regular rate ;no rubs; no thrills   Abdomen; positive Hernandez's point  Active peristalsis. Tender Both Lower Quadrants  Extremities; 2+ edema  Neurological; no tremors no asterixis       Relevant Results  Results for orders placed or performed during the hospital encounter of 02/11/25 (from the past 24 hours)   POCT GLUCOSE   Result Value Ref Range    POCT Glucose 137 (H) 74 - 99 mg/dL   POCT GLUCOSE   Result Value Ref Range    POCT Glucose 169 (H) 74 - 99 mg/dL   POCT GLUCOSE   Result Value Ref Range    POCT Glucose 89 74 - 99 mg/dL       Assessment/Plan   ESRD  Acute kidney injury superimposed on chronic kidney disease stage V  Pyocystitis  Enterococcus faecalis infection  History of right ureteral stenosis  Cholecystitis without cholangitis  Anemia of chronic disease and renal failure  Abdominal pain  Anasarca  Hyperuricemia  Secondary hyperparathyroidism of renal origin plan  Plan  Dialysis tomorrow      Assessment & Plan  Acute renal failure, unspecified acute renal failure type (CMS-HCC)           I spent 30  minutes in the professional and overall care of this patient.      Chinedu Bryant MD      "

## 2025-02-19 ENCOUNTER — APPOINTMENT (OUTPATIENT)
Dept: DIALYSIS | Facility: HOSPITAL | Age: 55
End: 2025-02-19
Payer: MEDICARE

## 2025-02-19 VITALS
HEART RATE: 85 BPM | TEMPERATURE: 97.2 F | OXYGEN SATURATION: 98 % | DIASTOLIC BLOOD PRESSURE: 87 MMHG | HEIGHT: 63 IN | WEIGHT: 175 LBS | SYSTOLIC BLOOD PRESSURE: 175 MMHG | RESPIRATION RATE: 15 BRPM | BODY MASS INDEX: 31.01 KG/M2

## 2025-02-19 LAB
ABO GROUP (TYPE) IN BLOOD: NORMAL
ALBUMIN SERPL BCP-MCNC: 2.4 G/DL (ref 3.4–5)
ALP SERPL-CCNC: 60 U/L (ref 33–110)
ALT SERPL W P-5'-P-CCNC: 4 U/L (ref 7–45)
ANION GAP SERPL CALC-SCNC: 14 MMOL/L (ref 10–20)
ANTIBODY SCREEN: NORMAL
AST SERPL W P-5'-P-CCNC: 32 U/L (ref 9–39)
BILIRUB SERPL-MCNC: 0.3 MG/DL (ref 0–1.2)
BLOOD EXPIRATION DATE: NORMAL
BUN SERPL-MCNC: 35 MG/DL (ref 6–23)
CALCIUM SERPL-MCNC: 7.4 MG/DL (ref 8.6–10.3)
CHLORIDE SERPL-SCNC: 101 MMOL/L (ref 98–107)
CO2 SERPL-SCNC: 25 MMOL/L (ref 21–32)
CREAT SERPL-MCNC: 5.56 MG/DL (ref 0.5–1.05)
DISPENSE STATUS: NORMAL
EGFRCR SERPLBLD CKD-EPI 2021: 8 ML/MIN/1.73M*2
ERYTHROCYTE [DISTWIDTH] IN BLOOD BY AUTOMATED COUNT: 12.8 % (ref 11.5–14.5)
GLUCOSE BLD MANUAL STRIP-MCNC: 103 MG/DL (ref 74–99)
GLUCOSE BLD MANUAL STRIP-MCNC: 167 MG/DL (ref 74–99)
GLUCOSE BLD MANUAL STRIP-MCNC: 98 MG/DL (ref 74–99)
GLUCOSE SERPL-MCNC: 99 MG/DL (ref 74–99)
HCT VFR BLD AUTO: 20.8 % (ref 36–46)
HGB BLD-MCNC: 6.8 G/DL (ref 12–16)
MCH RBC QN AUTO: 29.3 PG (ref 26–34)
MCHC RBC AUTO-ENTMCNC: 32.7 G/DL (ref 32–36)
MCV RBC AUTO: 90 FL (ref 80–100)
NRBC BLD-RTO: 0 /100 WBCS (ref 0–0)
PLATELET # BLD AUTO: 147 X10*3/UL (ref 150–450)
POTASSIUM SERPL-SCNC: 3.5 MMOL/L (ref 3.5–5.3)
PRODUCT BLOOD TYPE: 5100
PRODUCT CODE: NORMAL
PROT SERPL-MCNC: 5.1 G/DL (ref 6.4–8.2)
RBC # BLD AUTO: 2.32 X10*6/UL (ref 4–5.2)
RH FACTOR (ANTIGEN D): NORMAL
SODIUM SERPL-SCNC: 136 MMOL/L (ref 136–145)
UNIT ABO: NORMAL
UNIT NUMBER: NORMAL
UNIT RH: NORMAL
UNIT VOLUME: 350
WBC # BLD AUTO: 7.2 X10*3/UL (ref 4.4–11.3)
XM INTEP: NORMAL

## 2025-02-19 PROCEDURE — 8010000001 HC DIALYSIS - HEMODIALYSIS PER DAY

## 2025-02-19 PROCEDURE — 2500000001 HC RX 250 WO HCPCS SELF ADMINISTERED DRUGS (ALT 637 FOR MEDICARE OP): Performed by: NURSE PRACTITIONER

## 2025-02-19 PROCEDURE — 85027 COMPLETE CBC AUTOMATED: CPT | Performed by: INTERNAL MEDICINE

## 2025-02-19 PROCEDURE — 36430 TRANSFUSION BLD/BLD COMPNT: CPT

## 2025-02-19 PROCEDURE — 36415 COLL VENOUS BLD VENIPUNCTURE: CPT | Performed by: PHYSICIAN ASSISTANT

## 2025-02-19 PROCEDURE — 86923 COMPATIBILITY TEST ELECTRIC: CPT

## 2025-02-19 PROCEDURE — 2500000001 HC RX 250 WO HCPCS SELF ADMINISTERED DRUGS (ALT 637 FOR MEDICARE OP): Performed by: INTERNAL MEDICINE

## 2025-02-19 PROCEDURE — 36415 COLL VENOUS BLD VENIPUNCTURE: CPT | Performed by: INTERNAL MEDICINE

## 2025-02-19 PROCEDURE — 2500000004 HC RX 250 GENERAL PHARMACY W/ HCPCS (ALT 636 FOR OP/ED): Performed by: INTERNAL MEDICINE

## 2025-02-19 PROCEDURE — 82947 ASSAY GLUCOSE BLOOD QUANT: CPT

## 2025-02-19 PROCEDURE — 80053 COMPREHEN METABOLIC PANEL: CPT | Performed by: INTERNAL MEDICINE

## 2025-02-19 PROCEDURE — 86900 BLOOD TYPING SEROLOGIC ABO: CPT | Performed by: PHYSICIAN ASSISTANT

## 2025-02-19 PROCEDURE — P9016 RBC LEUKOCYTES REDUCED: HCPCS

## 2025-02-19 RX ADMIN — ALLOPURINOL 50 MG: 100 TABLET ORAL at 12:24

## 2025-02-19 RX ADMIN — HEPARIN SODIUM 2200 UNITS: 1000 INJECTION, SOLUTION INTRAVENOUS; SUBCUTANEOUS at 11:47

## 2025-02-19 RX ADMIN — PANTOPRAZOLE SODIUM 40 MG: 40 TABLET, DELAYED RELEASE ORAL at 06:27

## 2025-02-19 RX ADMIN — HEPARIN SODIUM 2400 UNITS: 1000 INJECTION, SOLUTION INTRAVENOUS; SUBCUTANEOUS at 11:47

## 2025-02-19 RX ADMIN — PARICALCITOL 2 MCG: 2 INJECTION, SOLUTION INTRAVENOUS at 12:27

## 2025-02-19 RX ADMIN — AMLODIPINE BESYLATE 10 MG: 10 TABLET ORAL at 12:24

## 2025-02-19 RX ADMIN — IRON SUCROSE 50 MG: 20 INJECTION, SOLUTION INTRAVENOUS at 12:27

## 2025-02-19 RX ADMIN — HEPARIN SODIUM 1000 UNITS: 1000 INJECTION, SOLUTION INTRAVENOUS; SUBCUTANEOUS at 08:40

## 2025-02-19 RX ADMIN — CARVEDILOL 12.5 MG: 12.5 TABLET, FILM COATED ORAL at 12:24

## 2025-02-19 RX ADMIN — SUCRALFATE 1 G: 1 TABLET ORAL at 06:27

## 2025-02-19 RX ADMIN — SUCRALFATE 1 G: 1 TABLET ORAL at 12:29

## 2025-02-19 RX ADMIN — PIPERACILLIN SODIUM AND TAZOBACTAM SODIUM 2.25 G: 2; .25 INJECTION, SOLUTION INTRAVENOUS at 12:24

## 2025-02-19 ASSESSMENT — COGNITIVE AND FUNCTIONAL STATUS - GENERAL
DAILY ACTIVITIY SCORE: 24
MOBILITY SCORE: 23
CLIMB 3 TO 5 STEPS WITH RAILING: A LITTLE

## 2025-02-19 ASSESSMENT — PAIN - FUNCTIONAL ASSESSMENT: PAIN_FUNCTIONAL_ASSESSMENT: NO/DENIES PAIN

## 2025-02-19 ASSESSMENT — PAIN SCALES - GENERAL: PAINLEVEL_OUTOF10: 0 - NO PAIN

## 2025-02-19 NOTE — PRE-PROCEDURE NOTE
Report from Sending RN:    Report From: Omid  Recent Surgery of Procedure: No  Baseline Level of Consciousness (LOC): a/o x4  Oxygen Use: No  Type: ra  Diabetic: Yes, 103  Last BP Med Given Day of Dialysis: See MAR  Last Pain Med Given: See MAR  Lab Tests to be Obtained with Dialysis: No  Blood Transfusion to be Given During Dialysis: No  Available IV Access: Yes  Medications to be Administered During Dialysis: No  Continuous IV Infusion Running: No  Restraints on Currently or in the Last 24 Hours: No  Hand-Off Communication: full code; stable for hd; hd catheter  Dialysis Catheter Dressing: tbd  Last Dressing Change: tba

## 2025-02-19 NOTE — PROGRESS NOTES
Sw provided Kamronita with updated Dialysis Flow sheets.  Pt is for her first treatment session tomorrow at [facility] at 11am (needs to be there at 10:15am).  Pt has arranged for transport to get to dialysis.  Sw answered all further questions pt had. Sw will continue to provide support and services as needed.

## 2025-02-19 NOTE — CARE PLAN
The patient's goals for the shift include  sleep and rest    The clinical goals for the shift include Maintain safety and comfort      Problem: Safety - Adult  Goal: Free from fall injury  Outcome: Progressing     Problem: Discharge Planning  Goal: Discharge to home or other facility with appropriate resources  Outcome: Progressing  Flowsheets (Taken 2/18/2025 1950)  Discharge to home or other facility with appropriate resources:   Arrange for needed discharge resources and transportation as appropriate   Identify barriers to discharge with patient and caregiver     Problem: Skin  Goal: Participates in plan/prevention/treatment measures  Outcome: Progressing  Flowsheets (Taken 2/18/2025 1950)  Participates in plan/prevention/treatment measures: Elevate heels  Goal: Promote skin healing  Outcome: Progressing  Flowsheets (Taken 2/18/2025 1950)  Promote skin healing:   Rotate device position/do not position patient on device   Turn/reposition every 2 hours/use positioning/transfer devices

## 2025-02-19 NOTE — POST-PROCEDURE NOTE
Report to Receiving RN:    Report To: Omid  Time Report Called: 1156  Hand-Off Communication: tx completed; pt tolerated tx well; vss post tx; pt discharged stable back to nursing floor  Complications During Treatment: Yes, 981 ml  Ultrafiltration Treatment: Yes, 1 liter  Medications Administered During Dialysis: No  Blood Products Administered During Dialysis: No  Labs Sent During Dialysis: No  Heparin Drip Rate Changes: No  Dialysis Catheter Dressing: clean, dry and intact  Last Dressing Change: 2/14/25    Electronic Signatures:  Vilma Martínez RN (Signed CE)   Authored: CE      Last Updated: 11:00 AM by VILMA MARTÍNEZ

## 2025-02-19 NOTE — PROGRESS NOTES
"    Subjective   Patient had dialysis today.  Had a drop on her hemoglobin to 6.8 awaiting blood transfusion  Objective          Vitals 24HR  Heart Rate:  []   Temp:  [35.6 °C (96.1 °F)-37.2 °C (99 °F)]   Resp:  [14-18]   BP: (128-192)/(63-91)   Height:  [160 cm (5' 2.99\")]   SpO2:  [96 %-100 %]         Intake/Output last 3 Shifts:    Intake/Output Summary (Last 24 hours) at 2/19/2025 1502  Last data filed at 2/19/2025 1254  Gross per 24 hour   Intake 1700 ml   Output 2781 ml   Net -1081 ml       Physical Exam    General appearance; alert pleasant  HEENT; pupils react to light and accommodation  Neck; no JVD no bruit no thyromegaly  No cervical adenopathy  Lungs; clear to auscultation and percussion  Heart; regular rate ;no rubs; no thrills   Abdomen; positive Hernandez's point  Active peristalsis. Tender Both Lower Quadrants  Extremities; 2+ edema  Neurological; no tremors no asterixis       Relevant Results  Results for orders placed or performed during the hospital encounter of 02/11/25 (from the past 24 hours)   POCT GLUCOSE   Result Value Ref Range    POCT Glucose 152 (H) 74 - 99 mg/dL   POCT GLUCOSE   Result Value Ref Range    POCT Glucose 145 (H) 74 - 99 mg/dL   POCT GLUCOSE   Result Value Ref Range    POCT Glucose 103 (H) 74 - 99 mg/dL   Comprehensive Metabolic Panel   Result Value Ref Range    Glucose 99 74 - 99 mg/dL    Sodium 136 136 - 145 mmol/L    Potassium 3.5 3.5 - 5.3 mmol/L    Chloride 101 98 - 107 mmol/L    Bicarbonate 25 21 - 32 mmol/L    Anion Gap 14 10 - 20 mmol/L    Urea Nitrogen 35 (H) 6 - 23 mg/dL    Creatinine 5.56 (H) 0.50 - 1.05 mg/dL    eGFR 8 (L) >60 mL/min/1.73m*2    Calcium 7.4 (L) 8.6 - 10.3 mg/dL    Albumin 2.4 (L) 3.4 - 5.0 g/dL    Alkaline Phosphatase 60 33 - 110 U/L    Total Protein 5.1 (L) 6.4 - 8.2 g/dL    AST 32 9 - 39 U/L    Bilirubin, Total 0.3 0.0 - 1.2 mg/dL    ALT 4 (L) 7 - 45 U/L   CBC   Result Value Ref Range    WBC 7.2 4.4 - 11.3 x10*3/uL    nRBC 0.0 0.0 - 0.0 /100 " WBCs    RBC 2.32 (L) 4.00 - 5.20 x10*6/uL    Hemoglobin 6.8 (L) 12.0 - 16.0 g/dL    Hematocrit 20.8 (L) 36.0 - 46.0 %    MCV 90 80 - 100 fL    MCH 29.3 26.0 - 34.0 pg    MCHC 32.7 32.0 - 36.0 g/dL    RDW 12.8 11.5 - 14.5 %    Platelets 147 (L) 150 - 450 x10*3/uL   Prepare RBC: 1 Units   Result Value Ref Range    PRODUCT CODE [id number]     Unit Number [id number]     Unit ABO O     Unit RH POS     XM INTEP COMP     Dispense Status IS     Blood Expiration Date 2/26/2025 11:59:00 PM EST     PRODUCT BLOOD TYPE 5100     UNIT VOLUME 350    Type and screen   Result Value Ref Range    ABO TYPE O     Rh TYPE POS     ANTIBODY SCREEN NEG    POCT GLUCOSE   Result Value Ref Range    POCT Glucose 98 74 - 99 mg/dL       Assessment/Plan   ESRD  Anemia blood loss  Acute kidney injury superimposed on chronic kidney disease stage V  Pyocystitis  Enterococcus faecalis infection  History of right ureteral stenosis  Cholecystitis without cholangitis  Anemia of chronic disease and renal failure  Abdominal pain  Anasarca  Hyperuricemia  Secondary hyperparathyroidism of renal origin plan  Plan  Agree with blood transfusion  Will get GI input for possible intervention  Continue proton pump inhibitors  CBC in the morning    Assessment & Plan  Acute renal failure, unspecified acute renal failure type (CMS-HCC)           I spent 30  minutes in the professional and overall care of this patient.      Chinedu Bryant MD

## 2025-02-19 NOTE — CARE PLAN
I accompanied the attending physician, Dr. Puri, during rounding today. He has asked me to assist with expediting the patients discharge from the hospital and has verbally given me the discharge order from the hospital. I have been uninvolved in the patient's care up to this point. The attending requests the pt continue their home medications without modification. The pt completed a 5 day course of Zosyn for UTI during this admission. Dr. Puri aware her Hb is 6.8 today. He would like her to have a unit of blood. He has asked me to set up her discharge for her for today after she is successfully dialyzed and has her blood transfusion.

## 2025-02-19 NOTE — DISCHARGE SUMMARY
Discharge Diagnosis  Acute renal failure, unspecified acute renal failure type (CMS-HCC)    Issues Requiring Follow-Up      Discharge Meds     Medication List      CONTINUE taking these medications     Accu-Chek Guide test strips strip; Generic drug: blood sugar diagnostic;   Use to test blood sugar once a day   acetaminophen 500 mg tablet; Commonly known as: Tylenol   amLODIPine 10 mg tablet; Commonly known as: Norvasc; Take 1 tablet (10   mg) by mouth once daily.   aspirin 81 mg chewable tablet   bisacodyl 5 mg EC tablet; Commonly known as: Dulcolax; Take 1 tablet (5   mg) by mouth once daily as needed for constipation.   blood-glucose meter misc; Commonly known as: Accu-Chek Guide Glucose   Meter; Use to test blood sugar once a day   buPROPion  mg 12 hr tablet; Commonly known as: Wellbutrin SR; Take   1 tablet (200 mg) by mouth once daily.   carvedilol 12.5 mg tablet; Commonly known as: Coreg; Take 1 tablet (12.5   mg) by mouth 2 times a day.   clopidogrel 75 mg tablet; Commonly known as: Plavix; Take 1 tablet (75   mg) by mouth once daily.   docusate sodium 100 mg capsule; Commonly known as: Colace; Take 1   capsule (100 mg) by mouth 2 times a day as needed for constipation.   FeroSuL tablet; Generic drug: ferrous sulfate; TAKE 1 TABLET (325 MG) BY   MOUTH ONCE DAILY WITH BREAKFAST.   folic acid 1 mg tablet; Commonly known as: Folvite; Take 1 tablet (1 mg)   by mouth once daily.   lancets misc; Commonly known as: Accu-Chek Fastclix Lancet Drum; Use to   test blood sugar once a day     STOP taking these medications     atorvastatin 80 mg tablet; Commonly known as: Lipitor   doxycycline 100 mg capsule; Commonly known as: Vibramycin   lidocaine 4 % patch   pantoprazole 20 mg EC tablet; Commonly known as: ProtoNix   Trulicity 0.75 mg/0.5 mL pen injector; Generic drug: dulaglutide       Test Results Pending At Discharge  Pending Labs       Order Current Status    Type and screen In process            Hospital  Course    54 y.o. female presenting with With a past medical history of CKD stage V, anemia, NSTEMI, HLD, CVA, depression with suicidal ideation, T2DM who presents to [facility] for evaluation of abdominal pain.  Patient reports abdominal pain began yesterday spontaneously.  She reports the pain felt like an aching, severity 10/10 at its worst, and was continuous until arriving to the ER receiving pain medication.  she reports associated nausea without vomiting and 2 episodes of diarrhea.  She report decreased appetite over the last couple weeks, she does report adequate fluid intake.  She reports dark stools.  She denies any chest pain, shortness of breath, she does reports intermittent dizziness, she denies headache.  She denies any fevers or chills.  She reports she has taken her blood pressure medication as prescribed other than today.     Of note patient recently diagnosed with the flu on 1/28.  Patient also recently on doxycycline for upper respiratory infection.     ED course: Afebrile, hypertensive at 207/97 otherwise hemodynamically stable.  Labs notable for glucose 149, potassium 5.8 (slightly hemolyzed), chloride 108, bicarb 16, , creatinine 15.19, hemoglobin 7.3 (1/20/2025 hemoglobin 10.4), hematocrit 21.8.  No leukocytosis.  UA pending.  CT abdomen pelvis revealing cholelithiasis, anasarca/hypoproteinemia, small pericardial effusion.  Morphine, Zofran, 1 L normal saline in ER.     10 point review of systems has been performed and is negative except what is stated above     Ultimately she required chronic dialysis.  We had a vascular renal catheter tunneled catheter placed.  She went through her 3 rounds of dialysis she did very well.  Finally I reviewed.  Continued care.  I reconciled all of her medications.  I wrote for all of her pertinent prescriptions.  I made sure she had a chair time outpatient dialysis was scheduled.  I went ahead and discharged her and reviewed all of her laboratory had another  round of dialysis and she was discharged in stable condition.  Discharge day management greater than 30 minutes total time thank you    Pertinent Physical Exam At Time of Discharge  Physical Exam  Lungs are clear heart has regular rate and rhythm abdomen is soft is not distended or firm  Outpatient Follow-Up  Future Appointments   Date Time Provider Department Center   2/28/2025 12:00 PM Melissa Luz MD [id number] [facility]   4/14/2025 11:15 AM Marcos Ma MD [id number] [place]         Rai Puri DO

## 2025-02-20 ENCOUNTER — PATIENT OUTREACH (OUTPATIENT)
Dept: PRIMARY CARE | Facility: CLINIC | Age: 55
End: 2025-02-20
Payer: MEDICARE

## 2025-02-20 NOTE — PROGRESS NOTES
Discharge Facility:[facility]  Discharge Diagnosis:Acute Renal Failure  Admission Date:2/11/25  Discharge Date: 2/19/25    PCP Appointment Date:2/28/25 already was scheduled changed appt type to hospital follow up  Specialist Appointment Date:   Hospital Encounter and Summary Linked: YesED to Hosp-Admission (Discharged) with Rai Puri DO; Daphne ZAMBRANO MD (02/11/2025)   See discharge assessment below for further details  Two attempts were made to reach patient within two business days after discharge. Voicemail left with contact information for patient to call back with any non-emergent questions or concerns.

## 2025-02-27 LAB
ATRIAL RATE: 90 BPM
P AXIS: 62 DEGREES
P OFFSET: 198 MS
P ONSET: 147 MS
PR INTERVAL: 132 MS
Q ONSET: 213 MS
QRS COUNT: 15 BEATS
QRS DURATION: 78 MS
QT INTERVAL: 372 MS
QTC CALCULATION(BAZETT): 455 MS
QTC FREDERICIA: 425 MS
R AXIS: -45 DEGREES
T AXIS: 57 DEGREES
T OFFSET: 399 MS
VENTRICULAR RATE: 90 BPM

## 2025-02-28 ENCOUNTER — APPOINTMENT (OUTPATIENT)
Dept: PRIMARY CARE | Facility: CLINIC | Age: 55
End: 2025-02-28
Payer: MEDICARE

## 2025-02-28 VITALS — WEIGHT: 166.8 LBS | HEART RATE: 103 BPM | HEIGHT: 63 IN | BODY MASS INDEX: 29.55 KG/M2 | OXYGEN SATURATION: 96 %

## 2025-02-28 DIAGNOSIS — Z09 HOSPITAL DISCHARGE FOLLOW-UP: Primary | ICD-10-CM

## 2025-02-28 DIAGNOSIS — Z99.2 ESRD (END STAGE RENAL DISEASE) ON DIALYSIS (MULTI): ICD-10-CM

## 2025-02-28 DIAGNOSIS — N18.5 ANEMIA OF CHRONIC RENAL FAILURE, STAGE 5 (MULTI): ICD-10-CM

## 2025-02-28 DIAGNOSIS — N18.6 ESRD (END STAGE RENAL DISEASE) ON DIALYSIS (MULTI): ICD-10-CM

## 2025-02-28 DIAGNOSIS — N18.5 ACUTE KIDNEY INJURY SUPERIMPOSED ON STAGE 5 CHRONIC KIDNEY DISEASE, NOT ON CHRONIC DIALYSIS: ICD-10-CM

## 2025-02-28 DIAGNOSIS — N17.9 ACUTE KIDNEY INJURY SUPERIMPOSED ON STAGE 5 CHRONIC KIDNEY DISEASE, NOT ON CHRONIC DIALYSIS: ICD-10-CM

## 2025-02-28 DIAGNOSIS — D63.1 ANEMIA OF CHRONIC RENAL FAILURE, STAGE 5 (MULTI): ICD-10-CM

## 2025-02-28 PROCEDURE — 99495 TRANSJ CARE MGMT MOD F2F 14D: CPT | Performed by: STUDENT IN AN ORGANIZED HEALTH CARE EDUCATION/TRAINING PROGRAM

## 2025-02-28 PROCEDURE — 1036F TOBACCO NON-USER: CPT | Performed by: STUDENT IN AN ORGANIZED HEALTH CARE EDUCATION/TRAINING PROGRAM

## 2025-02-28 PROCEDURE — 3008F BODY MASS INDEX DOCD: CPT | Performed by: STUDENT IN AN ORGANIZED HEALTH CARE EDUCATION/TRAINING PROGRAM

## 2025-02-28 RX ORDER — PANTOPRAZOLE SODIUM 20 MG/1
20 TABLET, DELAYED RELEASE ORAL
COMMUNITY

## 2025-02-28 NOTE — PROGRESS NOTES
"Subjective   Patient ID: Yola Barillas is a 54 y.o. female who presents for Follow-up (4 month follow-up and hospital follow-up. 169/96).        HPI  53y/o female with HTN, CVA, NSTEMI , CAD , CKD stage 4/5 , major depression     Hosp dc fu   JOYCELYN on CKD after recent flu infection   Presented to the ED with abd pain on 2/11  Was found to be uremic , was hemodialyzed     Dced on 2/19   Feeling better now     Hb dropped from 10.4 on 1.28 to 7.3 on 2/11   She was taking iron pills prior to the hospitalization     Visit Vitals  Pulse 103   Ht 1.6 m (5' 2.99\")   Wt 75.7 kg (166 lb 12.8 oz)   SpO2 96%   BMI 29.56 kg/m²   Smoking Status Never   BSA 1.83 m²      No LMP recorded.   Current Outpatient Medications   Medication Instructions    acetaminophen (TYLENOL) 500 mg, Every 6 hours PRN    amLODIPine (NORVASC) 10 mg, oral, Daily    aspirin 81 mg chewable tablet 1 tablet, Daily    bisacodyl (DULCOLAX) 5 mg, oral, Daily PRN    blood sugar diagnostic (Accu-Chek Guide test strips) strip Use to test blood sugar once a day    blood-glucose meter (Accu-Chek Guide Glucose Meter) misc Use to test blood sugar once a day    buPROPion SR (WELLBUTRIN SR) 200 mg, oral, Daily    carvedilol (COREG) 12.5 mg, oral, 2 times daily    clopidogrel (PLAVIX) 75 mg, oral, Daily    docusate sodium (COLACE) 100 mg, oral, 2 times daily PRN    ferrous sulfate, 325 mg ferrous sulfate, (FeroSuL) tablet TAKE 1 TABLET (325 MG) BY MOUTH ONCE DAILY WITH BREAKFAST.    folic acid (FOLVITE) 1 mg, oral, Daily    lancets (Accu-Chek Fastclix Lancet Drum) misc Use to test blood sugar once a day    pantoprazole (PROTONIX) 20 mg, Daily before breakfast      Social History     Tobacco Use    Smoking status: Never     Passive exposure: Never    Smokeless tobacco: Never   Substance Use Topics    Alcohol use: Never        Review of Systems    Constitutional : No feeling poorly / fevers/ chills / night sweats/ fatigue   Cardiovascular : No CP /Palpitations/ lower " extremity edema / syncope   Respiratory : No Cough /MCKINLEY/Dyspnea at rest   Gastrointestinal : No abd pain / N/V  No bloody stools/ melena / constipation  Endo : No polyuria/polydipsia/ muscle weakness / sluggishness   CNS: No confusion / HA/ tingling/ numbness/ weakness of extremities  Psychiatric: No anxiety/ depression/ SI/HI    All other systems have been reviewed and are negative for complaint       Physical Exam    Constitutional : Vitals reviewed. Alert and in no distress  Cardiovascular : RRR, Normal S1, S2, No pericardial rub/ gallop, no peripheral edema   Pulmonary: No respiratory distress, CTAB   MSK : Normal gait and station , strength and tone   Skin: Warm to touch ,  normal skin turgor   Neurologic : CNs 2-12 grossly intact , no obvious FNDs  Psych : A,Ox3, normal mood and affect      Assessment/Plan   Diagnoses and all orders for this visit:  Hospital discharge follow-up  Acute kidney injury superimposed on stage 5 chronic kidney disease, not on chronic dialysis  Anemia of chronic renal failure, stage 5 (Multi)  -     CBC; Future  -     Iron and TIBC; Future  -     Vitamin B12; Future  -     Folate; Future  ESRD (end stage renal disease) on dialysis (Multi)  -     Renal function panel; Future        55y/o female with HTN, CVA, NSTEMI , CAD , CKD stage 5 , major depression presents for hospital dc fu for JOYCELYN on CKD now on HD , gall stones without cholecystitis     Has an upcoming appt with vasc     Labs as above  to be done around march 19th   Now living in [place], will be establishing care closer to her residence.      Conditions addressed and mgmt as noted above.  Pertinent labs, images/ imaging reports , chart review was done .   Age appropriate labs / labs for mgmt of chronic medical conditions ordered, further mgmt pending the results.         RTO in  m or sooner if needed . Labs to be done few days prior to the next visit.        This note is intended for the physician writing it, as well as to  communicate findings to other healthcare professionals. These notes use medical lexicon that may be misunderstood by non medical persons. Therefore, interpretations of medical notes and terminology should be approached with caution.

## 2025-03-03 ENCOUNTER — PATIENT OUTREACH (OUTPATIENT)
Dept: PRIMARY CARE | Facility: CLINIC | Age: 55
End: 2025-03-03
Payer: MEDICARE

## 2025-03-19 ENCOUNTER — PATIENT OUTREACH (OUTPATIENT)
Dept: PRIMARY CARE | Facility: CLINIC | Age: 55
End: 2025-03-19
Payer: MEDICARE

## 2025-03-25 ENCOUNTER — APPOINTMENT (OUTPATIENT)
Dept: VASCULAR SURGERY | Facility: CLINIC | Age: 55
End: 2025-03-25
Payer: MEDICARE

## 2025-04-10 ENCOUNTER — HOSPITAL ENCOUNTER (OUTPATIENT)
Dept: VASCULAR MEDICINE | Facility: HOSPITAL | Age: 55
Discharge: HOME | End: 2025-04-10
Payer: MEDICARE

## 2025-04-10 DIAGNOSIS — N19 RENAL FAILURE AS COMPLICATION OF CARE: ICD-10-CM

## 2025-04-10 DIAGNOSIS — Z01.818 PREOP EXAMINATION: ICD-10-CM

## 2025-04-10 DIAGNOSIS — N18.6 END STAGE RENAL DISEASE (MULTI): ICD-10-CM

## 2025-04-10 DIAGNOSIS — T88.8XXA RENAL FAILURE AS COMPLICATION OF CARE: ICD-10-CM

## 2025-04-10 DIAGNOSIS — Z01.818 ENCOUNTER FOR OTHER PREPROCEDURAL EXAMINATION: ICD-10-CM

## 2025-04-10 PROCEDURE — 93922 UPR/L XTREMITY ART 2 LEVELS: CPT

## 2025-04-10 PROCEDURE — 93985 DUP-SCAN HEMO COMPL BI STD: CPT

## 2025-04-10 PROCEDURE — 93985 DUP-SCAN HEMO COMPL BI STD: CPT | Performed by: INTERNAL MEDICINE

## 2025-04-10 PROCEDURE — 93922 UPR/L XTREMITY ART 2 LEVELS: CPT | Performed by: INTERNAL MEDICINE

## 2025-04-14 ENCOUNTER — APPOINTMENT (OUTPATIENT)
Dept: CARDIOLOGY | Facility: CLINIC | Age: 55
End: 2025-04-14
Payer: MEDICARE

## 2025-04-17 ENCOUNTER — APPOINTMENT (OUTPATIENT)
Dept: VASCULAR SURGERY | Facility: CLINIC | Age: 55
End: 2025-04-17
Payer: MEDICARE

## 2025-04-17 VITALS
OXYGEN SATURATION: 92 % | HEART RATE: 91 BPM | BODY MASS INDEX: 29.23 KG/M2 | SYSTOLIC BLOOD PRESSURE: 110 MMHG | WEIGHT: 165 LBS | DIASTOLIC BLOOD PRESSURE: 78 MMHG | HEIGHT: 63 IN

## 2025-04-17 DIAGNOSIS — I87.8 POOR VENOUS ACCESS: Primary | ICD-10-CM

## 2025-04-17 PROBLEM — N18.6 END STAGE RENAL DISEASE (MULTI): Status: ACTIVE | Noted: 2025-04-17

## 2025-04-17 PROCEDURE — 1036F TOBACCO NON-USER: CPT | Performed by: SURGERY

## 2025-04-17 PROCEDURE — 3074F SYST BP LT 130 MM HG: CPT | Performed by: SURGERY

## 2025-04-17 PROCEDURE — 3078F DIAST BP <80 MM HG: CPT | Performed by: SURGERY

## 2025-04-17 PROCEDURE — 3008F BODY MASS INDEX DOCD: CPT | Performed by: SURGERY

## 2025-04-17 PROCEDURE — 99214 OFFICE O/P EST MOD 30 MIN: CPT | Performed by: SURGERY

## 2025-04-17 NOTE — PROGRESS NOTES
"Yola Doctor is a 55 y.o. female     Subjective   This patient presents today for discussion concerning permanent dialysis access.  She is currently 55 years old.  She has never smoked.  She is 5 foot 3 and weighs 165 pounds.  She is currently getting dialysis via right chest permacath that I put in not too long ago.  She denies any fever chills nausea vomiting or headache.         Objective     Vitals:    04/17/25 0700   BP: 110/78   Pulse: 91   SpO2: 92%      Physical Exam  This patient is alert and oriented x3 her head is normocephalic neck is soft and supple without palpable lymph nodes.  Heart is regular rate.  Lungs are clear.  Abdomen soft with positive bowel sounds.  There is no pain on palpation.  Upper and lower extremities have adequate range of motion with palpable brachial radial femoral and popliteal pulses.  Skin turgor is adequate.  Psychologically the patient appears to be acting appropriately.  Her right chest permacath is intact without issue  Blood pressure 110/78, pulse 91, height 1.6 m (5' 2.99\"), weight 74.8 kg (165 lb), SpO2 92%.            Patient Active Problem List    Diagnosis Date Noted    Acute renal failure, unspecified acute renal failure type 02/11/2025    Type 2 diabetes mellitus with both eyes affected by proliferative retinopathy without macular edema, without long-term current use of insulin 10/25/2024    Chronic kidney disease, stage 5 (Multi) 09/12/2024    History of hydronephrosis 02/23/2024    Anemia 08/24/2023    CAD (coronary artery disease) 08/24/2023    Fatigue 08/24/2023    H/O non-ST elevation myocardial infarction (NSTEMI) 08/24/2023    HLD (hyperlipidemia) 08/24/2023    Left retinal detachment 08/24/2023    Lower extremity weakness 08/24/2023    Poor vision 08/24/2023    Proteinuria 08/24/2023    UTI (urinary tract infection) 08/24/2023    Vitamin D deficiency 08/24/2023    Episode of recurrent major depressive disorder 09/23/2022    GERD (gastroesophageal reflux " disease) 09/23/2022    Diabetic cataract, associated with type 2 diabetes mellitus 05/24/2022    Depression with suicidal ideation 05/11/2022    Cerebrovascular accident (CVA) due to vascular stenosis (Multi) 11/22/2021    Nodular goiter 11/22/2021    Class 1 obesity with body mass index (BMI) of 30.0 to 30.9 in adult 11/17/2021    Primary hypertension 11/17/2021        Current Medications[1]     Lab Results   Component Value Date    WBC 7.2 02/19/2025    HGB 6.8 (L) 02/19/2025    HCT 20.8 (L) 02/19/2025     (L) 02/19/2025    CHOL 347 (H) 10/25/2024    TRIG 121 10/25/2024    HDL 87.6 10/25/2024    ALT 4 (L) 02/19/2025    AST 32 02/19/2025     02/19/2025    K 3.5 02/19/2025     02/19/2025    CREATININE 5.56 (H) 02/19/2025    BUN 35 (H) 02/19/2025    CO2 25 02/19/2025    TSH 1.29 05/02/2024    INR 1.1 02/13/2025    HGBA1C 4.9 10/25/2024       Vascular US palmar arch evaluation  Result Date: 4/10/2025           [place] [address] Tel [phone] and Fax [phone]  Vascular Lab Report [facility] US PALMAR ARCH EVALUATION  Patient Name:      REHAN CHAVEZ    Reading Physician:  [id number] Priya Blankenship MD, RPVI Study Date:        4/10/2025           Ordering Physician: [id number] BART DASILVA MRN/PID:           [id number]            Technologist:       Jaki Hollis RVT Accession#:        [id number]        Technologist 2: Date of Birth/Age: 1970 / 55 years Encounter#:         [id number] Gender:            F Admission Status:  Outpatient          Location Performed: [facility]  Diagnosis/ICD: Encounter for other preprocedural examination-Z01.818 Indication:    End Stage renal disease CPT Codes:     10108 Non-invasive physiologic studies  CONCLUSIONS:  Right Upper PVR: Complete right palmar arch. Left Upper PVR: Complete left palmar  arch.  Imaging & Doppler Findings:  RIGHT Digit Pressures          % Drop Digit Index Thumb                 115 mmHg Thumb  Thumb Thumb w/ comp         106 mmHg 8 %    0.83   LEFT Digit Pressures          % Drop Digit Index Thumb                125 mmHg Thumb  Thumb Thumb w/ comp        122 mmHg 2 %    0.91                     Right     Left Brachial Pressure 138 mmHg 138 mmHg   [id number] Priya Blankenship MD, JASPREET Electronically signed by [id number] Parikh MD on 4/10/2025 at 5:46:15 PM  ** Final **     Vascular US vein map for hemodialysis access arm bilateral  Result Date: 4/10/2025           [place] [address] Tel [phone] and Fax [phone]  Vascular Lab Report [facility] US VEIN MAP FOR HEMODIALYSIS ACCESS ARM BILATERAL  Patient Name:      REHAN CHAVEZ    Reading Physician:  [id number] Parikh MD Study Date:        4/10/2025           Ordering Physician: [id number] ABRT DASILVA MRN/PID:           [id number]            Technologist:       Alice Barakat RVLEXI Accession#:        [id number]        Technologist 2: Date of Birth/Age: 1970 / 55 years Encounter#:         [id number] Gender:            F Admission Status:  Outpatient          [facility] Performed: [facility]  Diagnosis/ICD: Encounter for other preprocedural examination-Z01.818 Indication:    Pre-op dialysis placement CPT Codes:     06361 Duplex arterial inflow and venous outflow, preop assessment                prior to creating dialysis access  CONCLUSIONS:  Right Upper Arterial: Right brachial, radial, and ulnar arteries are patent. Diameter measurements are listed below.  Left Upper Arterial: Left brachial, radial, and ulnar arteries are patent. Diameter measurements are listed below.  Right Upper Venous: No evidence of acute deep vein thrombus visualized in the right  upper extremity. Cannot rule out thrombus of non-visualized basilic vein due to body habitus. Additional Findings; Abnormal findings noted in thyroid. numerous nodules noted right lobe. Largest nodule measures 3.04 x 1.53 x 2.31cm and is complex in nature. Vasculature is identified within this nodule. Left Upper Venous: No evidence of acute deep vein thrombus visualized in the left upper extremity. Right Upper Vein Mapping: Right upper extremity vein: cephalic and basilic veins are patent but of small caliber. Left Upper Vein Mapping: Left upper extremity vein: cephalic and basilic veins are patent but of small caliber.  Imaging & Doppler Findings:  Right                   Compress Thrombus  Diam Cephalic Prox Arm         Yes      None   1.6 mm Cephalic Mid Arm          Yes      None   1.4 mm Cephalic Distal Arm       Yes      None   0.7 mm Cephalic Prox Forearm     Yes      None   0.8 mm Cephalic Mid Forearm      Yes      None   0.8 mm Cephalic Distal Forearm   Yes      None   1.1 mm  Left                    Compress Thrombus  Diam Cephalic Prox Arm         Yes      None   1.6 mm Cephalic Mid Arm          Yes      None   1.8 mm Cephalic Distal Arm       Yes      None   1.4 mm Cephalic Prox Forearm     Yes      None   1.5 mm Cephalic Mid Forearm      Yes      None   1.1 mm Cephalic Distal Forearm   Yes      None   1.0 mm Basilic Prox Arm          Yes      None   1.0 mm Basilic Mid Arm           Yes      None   1.1 mm Basilic Distal Arm        Yes      None   1.0 mm Basilic Prox Forearm      Yes      None   0.9 mm Basilic Mid Forearm       Yes      None   0.8 mm Basilic Distal Forearm    Yes      None   1.0 mm  Right            Compressible Thrombus        Flow Internal Jugular     Yes        None   Spontaneous/Phasic Subclavian           Yes        None   Spontaneous/Phasic Axillary             Yes        None   Spontaneous/Phasic Brachial             Yes        None Cephalic             Yes        None  Left              Compress Thrombus        Flow Internal Jugular   Yes      None   Spontaneous/Phasic Subclavian         Yes      None   Spontaneous/Phasic Axillary           Yes      None   Spontaneous/Phasic Brachial           Yes      None Cephalic           Yes      None Basilic            Yes      None  Right                             Left   PSV   Waveform                    PSV   Waveform 76 cm/s Triphasic Brachial Distal 68 cm/s Triphasic 45 cm/s Triphasic     Radial      81 cm/s Triphasic 58 cm/s Triphasic      Ulnar      49 cm/s Triphasic                 Right   Left Brachial Diam D 3.5 mm 3.5 mm  Radial Diam D  1.3 mm 1.7 mm   [id number] Priya Blankenship MD, JASPREET Electronically signed by [id number] JASPREET Avelar MD on 4/10/2025 at 5:40:27 PM  ** Final **                 Assessment/Plan   Assessment & Plan      This patient presents today for follow-up and discussion of permanent dialysis access.  I did place a right chest permacath in her not too long ago.  She is getting dialysis on Monday Wednesday and Friday without issue.  She did have vein mapping done which shows that her superficial veins are extremely small.  She is not a candidate for natural fistula and I am very concerned about the diameter of her axillary vein.  Also, her brachial artery is considered less than a average in size on palpation and on ultrasound.  This is a very big concern for long-term dialysis access.  My recommendations would be to have a discussion and initiate peritoneal dialysis.  She also absolutely needs to be put on the transplant list because of her vessel issues.  I will be talking with her nephrologist concerning all of these issues.      Nabeel Padron, DO        [1]   Current Outpatient Medications:     acetaminophen (Tylenol) 500 mg tablet, Take 1 tablet (500 mg) by mouth every 6 hours if needed for mild pain (1 - 3)., Disp: , Rfl:     amLODIPine (Norvasc) 10 mg tablet, Take 1 tablet (10 mg) by mouth once daily., Disp: 90 tablet,  Rfl: 0    aspirin 81 mg chewable tablet, Chew 1 tablet (81 mg) once daily., Disp: , Rfl:     bisacodyl (Dulcolax) 5 mg EC tablet, Take 1 tablet (5 mg) by mouth once daily as needed for constipation., Disp: 90 tablet, Rfl: 0    blood sugar diagnostic (Accu-Chek Guide test strips) strip, Use to test blood sugar once a day, Disp: 100 strip, Rfl: 3    blood-glucose meter (Accu-Chek Guide Glucose Meter) [facility], Use to test blood sugar once a day, Disp: 1 each, Rfl: 0    buPROPion SR (Wellbutrin SR) 200 mg 12 hr tablet, Take 1 tablet (200 mg) by mouth once daily., Disp: 90 tablet, Rfl: 3    carvedilol (Coreg) 12.5 mg tablet, Take 1 tablet (12.5 mg) by mouth 2 times a day., Disp: 180 tablet, Rfl: 1    clopidogrel (Plavix) 75 mg tablet, Take 1 tablet (75 mg) by mouth once daily., Disp: 90 tablet, Rfl: 3    docusate sodium (Colace) 100 mg capsule, Take 1 capsule (100 mg) by mouth 2 times a day as needed for constipation., Disp: 180 capsule, Rfl: 0    ferrous sulfate, 325 mg ferrous sulfate, (FeroSuL) tablet, TAKE 1 TABLET (325 MG) BY MOUTH ONCE DAILY WITH BREAKFAST., Disp: 90 tablet, Rfl: 1    folic acid (Folvite) 1 mg tablet, Take 1 tablet (1 mg) by mouth once daily., Disp: 90 tablet, Rfl: 1    lancets (Accu-Chek Fastclix Lancet Drum) [facility], Use to test blood sugar once a day, Disp: 100 each, Rfl: 3    pantoprazole (ProtoNix) 20 mg EC tablet, Take 1 tablet (20 mg) by mouth once daily in the morning. Take before meals. Do not crush, chew, or split., Disp: , Rfl:

## 2025-04-24 ENCOUNTER — TELEPHONE (OUTPATIENT)
Facility: HOSPITAL | Age: 55
End: 2025-04-24
Payer: MEDICARE

## 2025-05-07 ENCOUNTER — APPOINTMENT (OUTPATIENT)
Dept: PRIMARY CARE | Facility: CLINIC | Age: 55
End: 2025-05-07
Payer: MEDICARE

## 2025-05-07 VITALS
TEMPERATURE: 96.8 F | WEIGHT: 162 LBS | BODY MASS INDEX: 28.7 KG/M2 | DIASTOLIC BLOOD PRESSURE: 80 MMHG | SYSTOLIC BLOOD PRESSURE: 117 MMHG | HEART RATE: 92 BPM | HEIGHT: 63 IN | OXYGEN SATURATION: 98 %

## 2025-05-07 DIAGNOSIS — I25.2 H/O NON-ST ELEVATION MYOCARDIAL INFARCTION (NSTEMI): ICD-10-CM

## 2025-05-07 DIAGNOSIS — N18.5 CHRONIC KIDNEY DISEASE, STAGE 5 (MULTI): ICD-10-CM

## 2025-05-07 DIAGNOSIS — M25.421 ELBOW SWELLING, RIGHT: ICD-10-CM

## 2025-05-07 DIAGNOSIS — E11.3593 TYPE 2 DIABETES MELLITUS WITH BOTH EYES AFFECTED BY PROLIFERATIVE RETINOPATHY WITHOUT MACULAR EDEMA, WITHOUT LONG-TERM CURRENT USE OF INSULIN: Primary | ICD-10-CM

## 2025-05-07 DIAGNOSIS — I10 PRIMARY HYPERTENSION: ICD-10-CM

## 2025-05-07 DIAGNOSIS — E04.2 MULTIPLE THYROID NODULES: ICD-10-CM

## 2025-05-07 DIAGNOSIS — I25.10 CORONARY ARTERY DISEASE INVOLVING NATIVE CORONARY ARTERY OF NATIVE HEART WITHOUT ANGINA PECTORIS: ICD-10-CM

## 2025-05-07 PROBLEM — Z76.89 ENCOUNTER TO ESTABLISH CARE: Status: ACTIVE | Noted: 2025-05-07

## 2025-05-07 RX ORDER — AMLODIPINE BESYLATE 5 MG/1
5 TABLET ORAL DAILY
COMMUNITY
Start: 2025-05-05

## 2025-05-07 ASSESSMENT — ENCOUNTER SYMPTOMS
DEPRESSION: 0
OCCASIONAL FEELINGS OF UNSTEADINESS: 0
LOSS OF SENSATION IN FEET: 0

## 2025-05-07 ASSESSMENT — PAIN SCALES - GENERAL: PAINLEVEL_OUTOF10: 0-NO PAIN

## 2025-05-07 ASSESSMENT — PATIENT HEALTH QUESTIONNAIRE - PHQ9
1. LITTLE INTEREST OR PLEASURE IN DOING THINGS: NOT AT ALL
SUM OF ALL RESPONSES TO PHQ9 QUESTIONS 1 AND 2: 0
2. FEELING DOWN, DEPRESSED OR HOPELESS: NOT AT ALL

## 2025-05-07 NOTE — PROGRESS NOTES
"Subjective   Patient ID: Yola Doctor is a 55 y.o. female who presents for New Patient Visit.    HPI   Pleasant 56 y/o female with PMH CKD 4/5 on M/W/F HD, Anemia, NSTEMI, CVA, HLD, DM2, Depression with SI, HTN, GERD, MDD, who presents to establish care.   MW Annual due October    Current concerns  Right elbow swelling-notes the nurse at HD told her to have it looked at. Denies trauma or injury, no heat/redness/non-tender. Noted swelling 1 month ago, endorses pain when she leans hard on it but otherwise not a concern.    CKD- HD on M/W/F, follows with Nephrology Dr Bryant Currently being evaluated for Permanent dialysis Access, Vascular Dr Padron, recommending peritoneal.  Recently referred to Transplant Team    DM2- HGA1C 4.9 October 2024    Lives alone, safe  On Disability, no kids  Non smoker, no alcohol    Review of Systems  Review of Systems   Constitutional: Negative.    Respiratory: Negative.     Cardiovascular: Negative.    Gastrointestinal: Negative.    Musculoskeletal: Negative.    Psychiatric/Behavioral: Negative.     All other systems reviewed and are negative.    Objective   /80 (BP Location: Left arm, Patient Position: Sitting)   Pulse 92   Temp 36 °C (96.8 °F) (Temporal)   Ht 1.6 m (5' 2.99\")   Wt 73.5 kg (162 lb)   SpO2 98%   BMI 28.71 kg/m²     Physical Exam  Vitals reviewed.   Constitutional:       Appearance: Normal appearance.   Neck:      Comments: Right Permacath  Cardiovascular:      Rate and Rhythm: Normal rate and regular rhythm.   Musculoskeletal:         General: Normal range of motion.      Right elbow: Effusion present. No deformity or lacerations. Normal range of motion. No tenderness.      Cervical back: Normal range of motion and neck supple.   Skin:     General: Skin is warm and dry.      Capillary Refill: Capillary refill takes 2 to 3 seconds.      Findings: No erythema.   Neurological:      General: No focal deficit present.      Mental Status: She is alert. "   Psychiatric:         Mood and Affect: Mood normal.         Assessment/Plan   Problem List Items Addressed This Visit           ICD-10-CM    CAD (coronary artery disease) I25.10    Relevant Medications    amLODIPine (Norvasc) 5 mg tablet    H/O non-ST elevation myocardial infarction (NSTEMI) I25.2    Primary hypertension I10    VSS  NNO         Chronic kidney disease, stage 5 (Multi) N18.5    HD on M/W/F  Nephrology Dr Bryant   Currently being evaluated for Permanent dialysis Access, Vascular recommending peritoneal.  Recently referred to Transplant Team         Type 2 diabetes mellitus with both eyes affected by proliferative retinopathy without macular edema, without long-term current use of insulin - Primary E11.3593    HGA1C 4.9         Elbow swelling, right M25.421    Monitor for changes          Other Visit Diagnoses         Codes      Multiple thyroid nodules     E04.2    Relevant Orders    US thyroid

## 2025-05-08 ENCOUNTER — TELEPHONE (OUTPATIENT)
Facility: HOSPITAL | Age: 55
End: 2025-05-08
Payer: MEDICARE

## 2025-05-08 NOTE — ASSESSMENT & PLAN NOTE
HD on M/W/F  Nephrology Dr Bryant   Currently being evaluated for Permanent dialysis Access, Vascular recommending peritoneal.  Recently referred to Transplant Team

## 2025-05-15 ENCOUNTER — TELEPHONE (OUTPATIENT)
Facility: HOSPITAL | Age: 55
End: 2025-05-15
Payer: MEDICARE

## 2025-05-19 ENCOUNTER — TELEPHONE (OUTPATIENT)
Facility: HOSPITAL | Age: 55
End: 2025-05-19
Payer: MEDICARE

## 2025-05-19 ENCOUNTER — DOCUMENTATION (OUTPATIENT)
Facility: HOSPITAL | Age: 55
End: 2025-05-19
Payer: MEDICARE

## 2025-05-19 NOTE — PROGRESS NOTES
Pre-Kidney Intake Questionnaire    1. Do you live in an assisted living/nursing facility? No  2. Do you have difficulty reading or writing in English? No  3. Do you have transportation to and from you medical appointments? Yes.  What type of transportation do you use? Bus, Lyft  4. Do you have a family member or friend who can accompany you to the appointment? Yes  5. Who is your primary care doctor?  Dr. Brittany Yee  6. Who is your kidney doctor?  Dr. Chinedu Bryant  7. Are you currently on dialysis? Yes. What type?  Hemodialysis.  What days?  MWF.  How many years?  4 Months  8. Do you currently have any open sores or wounds? No  9. Have you ever had an amputation? No  10. Have you ever been diagnosed with cancer? No  11. Do you have a history of heart attack or stroke? Yes.  When?  Both- Stroke 2019,  ([place])Heart Attack- 10 Yrs ago ([facility])   12. Are you currently wearing oxygen? No  13. Have you been hospitalized for any reason in the last year?  Yes.  Where?  [facility](Flu, Kidneys)  Comments Intake Complete.

## 2025-05-21 ENCOUNTER — TELEPHONE (OUTPATIENT)
Dept: SURGERY | Facility: CLINIC | Age: 55
End: 2025-05-21
Payer: MEDICARE

## 2025-05-21 DIAGNOSIS — K59.09 CHRONIC CONSTIPATION: ICD-10-CM

## 2025-05-21 DIAGNOSIS — K21.9 GASTROESOPHAGEAL REFLUX DISEASE WITHOUT ESOPHAGITIS: Primary | ICD-10-CM

## 2025-05-21 DIAGNOSIS — I10 PRIMARY HYPERTENSION: ICD-10-CM

## 2025-05-21 DIAGNOSIS — N18.6 END STAGE RENAL DISEASE (MULTI): ICD-10-CM

## 2025-05-22 ENCOUNTER — DOCUMENTATION (OUTPATIENT)
Facility: HOSPITAL | Age: 55
End: 2025-05-22
Payer: MEDICARE

## 2025-05-22 RX ORDER — PANTOPRAZOLE SODIUM 20 MG/1
20 TABLET, DELAYED RELEASE ORAL
Qty: 30 TABLET | Refills: 3 | OUTPATIENT
Start: 2025-05-22

## 2025-05-22 NOTE — PROGRESS NOTES
Patient was reviewed at Patient Review Meeting on 05/22/25 with Dr. Doyle and Dr. Armstrong and LASHAUN Russell, TRINITY Powell, DIDIER Poole, and DIDIER Shankar.  Per review, patient can be scheduled in a standard Tuesday or Friday clinic.    Patient will be called and scheduled in appropriate clinic per review.

## 2025-05-23 RX ORDER — BISACODYL 5 MG
5 TABLET, DELAYED RELEASE (ENTERIC COATED) ORAL DAILY PRN
Qty: 30 TABLET | Refills: 0 | Status: SHIPPED | OUTPATIENT
Start: 2025-05-23 | End: 2025-06-22

## 2025-05-23 RX ORDER — PANTOPRAZOLE SODIUM 20 MG/1
20 TABLET, DELAYED RELEASE ORAL
Qty: 30 TABLET | Refills: 3 | Status: SHIPPED | OUTPATIENT
Start: 2025-05-23 | End: 2025-06-22

## 2025-05-23 RX ORDER — AMLODIPINE BESYLATE 5 MG/1
5 TABLET ORAL DAILY
Qty: 30 TABLET | Refills: 3 | Status: SHIPPED | OUTPATIENT
Start: 2025-05-23 | End: 2025-09-20

## 2025-05-27 ENCOUNTER — TELEPHONE (OUTPATIENT)
Facility: HOSPITAL | Age: 55
End: 2025-05-27
Payer: MEDICARE

## 2025-06-02 ENCOUNTER — TELEPHONE (OUTPATIENT)
Facility: HOSPITAL | Age: 55
End: 2025-06-02
Payer: MEDICARE

## 2025-06-02 DIAGNOSIS — N18.6 ESRD (END STAGE RENAL DISEASE) (MULTI): Primary | ICD-10-CM

## 2025-06-03 NOTE — H&P
History Of Present Illness :  Yola Barillas is a 55 y.o. female, patient of [facility],  who presents on referral from her neurologist, Dr. Chinedu Bryant, for consideration of insertion of peritoneal dialysis catheter.    The patient has known chronic kidney disease stage IV/V.  The patient was recently admitted to [facility] from February 11 through February 19 for worsening renal failure and symptoms thereof.  Please see the discharge summary and other documentation for details.  A right internal jugular tunneled hemodialysis catheter was placed by Dr. Padron from vascular surgery on 2/13/2025, and the patient instituted hemodialysis thereafter.  Upon discharge, the patient has continued to dialyze at [facility] q. Tuesday/Thursday/Saturday initially, and is now Monday/Wednesday/Friday.    Of note, the patient did see Dr. Padron in the office on 4/17/2025 with regard to permanent hemodialysis access.  That note was reviewed.  Based on the vein mapping, the patient is not a candidate for natural fistula and Dr. Padron's recommendation was to consider peritoneal dialysis.  He also recommended considering transplantation.    The patient's other medical problems include but not limited to anemia, NSTEMI, HLD, CVA, depression with suicidal ideation and T2DM.  Renal failure secondary to hypertensive and diabetic nephropathy also history of obstructive uropathy with right ureteral stenosis status post stenting.     The patient has seen Dr. Marcos Ma cardiology in the past, most recently on 1/8/2025.  That note was reviewed.  The patient carries the diagnoses of coronary artery disease, history of a non-ST elevation MI, and hyperlipidemia she is on Plavix 75 mg p.o. daily.    Transthoracic echocardiography on 1/22/2025 revealed the following:  CONCLUSIONS:   1. Left ventricular ejection fraction is normal, by visual estimate at 65-70%.   2. Spectral Doppler shows a Grade I (impaired relaxation  pattern) of left ventricular diastolic filling with normal left atrial filling pressure.   3. There is moderately increased septal and moderately increased posterior left ventricular wall thickness.   4. There is moderate concentric left ventricular hypertrophy.   5. There is normal right ventricular global systolic function.   6. The mitral valve is moderately thickened with a myxomatous appearance.   7. Mild to moderate mitral valve regurgitation.   8. Mild aortic valve stenosis.    Most recent EKG on 2/11/2025 revealed the following:  Normal sinus rhythm  Left anterior fascicular block  Abnormal ECG  When compared with ECG of 28-JAN-2025 21:16,  PREVIOUS ECG IS PRESENT  See ED provider note for full interpretation and clinical correlation  Confirmed by Mely Ahumada ([id number]) on 2/27/2025 9:16:27 PM    Past abdominal surgical history includes only the following procedure at [facility] on 10/19/2022:  Laparoscopic modified radical hysterectomy, bilateral salpingectomy, cystoscopy, ureteral stent removal; right ureteral stent replacement     The patient is single.  She has no children.  She lives in an apartment in [place].  She has been on disability for the last 2 years.  She has 4 brothers in the area that do look after her.    Past Medical History   Medical History[1]     Surgical History    Surgical History[2]     Allergies   RX Allergies[3]     Home Meds  Current Outpatient Medications   Medication Instructions    acetaminophen (TYLENOL) 500 mg, Every 6 hours PRN    amLODIPine (NORVASC) 5 mg, oral, Daily    aspirin 81 mg chewable tablet 1 tablet, Daily    bisacodyl (DULCOLAX) 5 mg, oral, Daily PRN    blood sugar diagnostic (Accu-Chek Guide test strips) strip Use to test blood sugar once a day    blood-glucose meter (Accu-Chek Guide Glucose Meter) misc Use to test blood sugar once a day    buPROPion SR (WELLBUTRIN SR) 200 mg, oral, Daily    carvedilol (COREG) 12.5 mg, oral, 2 times daily    clopidogrel (PLAVIX) 75  mg, oral, Daily    docusate sodium (COLACE) 100 mg, oral, 2 times daily PRN    ferrous sulfate, 325 mg ferrous sulfate, (FeroSuL) tablet TAKE 1 TABLET (325 MG) BY MOUTH ONCE DAILY WITH BREAKFAST.    folic acid (FOLVITE) 1 mg, oral, Daily    lancets (Accu-Chek Fastclix Lancet Drum) misc Use to test blood sugar once a day    pantoprazole (PROTONIX) 20 mg, oral, Daily before breakfast        Family History    Family History[4]     Social History  Social History[5]     Review Of Systems    Review of Systems    General: Not Present- Obesity, Cancer, HIV, MRSA, Recent Cold/Flu, Tired During the Day and VRE.  HEENT: Not Present- Migraine, Cataracts, Glaucoma, Macular Degeneration and Retinal Detachment.  Respiratory: Not Present- Asthma, Chronic Cough, Difficulty Breathing on Exertion, Difficulty Breathing at Rest, Emphysema, Frequent Bronchitis, Home CPAP/BiPAP, Home Oxygen, Pulmonary Embolus, Pneumonia/TB, Sleep Apnea and Snoring.  Cardiovascular: Not Present- Chest Pain, Congestive Heart Failure, Heart Attack, Coronary Artery Disease, Heart Stent, High Cholesterol/Lipids,  Internal Defibrillator, Irregular Heart Beat, Mitral Valve Prolapse, Murmur, Pacemaker and Peripheral Vascular Disease.  Gastrointestinal: Not Present- Heartburn, Hepatitis, Hiatal Hernia, Jaundice, Reflux, Stomach Ulcer and IBS.  Female Genitourinary: Not Present- Kidney Failure, Kidney Stones, Dialysis and Urinary Tract Infection.  Musculoskeletal: Not Present- Arthritis, Back Pain and Fibromyalgia.  Neurological: Not Present- Headaches, Numbness, Tingling, Seizures, Stroke,  Shunt and Weakness.  Psychiatric: Not Present- Anxiety, Bipolar and Panic Attacks.  Endocrine: Not Present- Hyperthyroidism, Hypothyroidism and Low Blood Sugar.  Hematology: Not Present- Abnormal Bleeding and Blood Clots.    Vitals  There were no vitals taken for this visit.     Physical Exam   General Complete Physical Exam    The physical exam findings are as  follows:    General Appearance - Cooperative and Well groomed. Not in acute distress. Build & Nutrition - Well nourished.  Posture - Normal posture. Gait - Normal. Hydration - Well hydrated.    Integumentary  General Characteristics: Overall examination of the patient's skin reveals - no rashes, no suspicious lesions, no bruises and no evidence of scars. Color - normal coloration of skin. Skin Moisture - normal skin moisture. Temperature - normal  warmth is noted. Texture - normal skin texture.    Head and Neck  Head - normocephalic, atraumatic with no lesions or palpable masses.  Neck  Global Assessment - full range of motion. no bruit auscultated on the right, no bruit auscultated on the left, non-tender, no lymphadenopathy, no palpable mass on the right and no palpable mass on the left.  Trachea - midline.  Thyroid Gland Characteristics - no palpable nodules, normal position, symmetric and smooth.    Eyes  Sclera/Conjunctiva - Bilateral - Normal. Pupil - Bilateral - Accommodating, Direct reaction to light normal and Equal.    ENMT  Global Assessment  Upon examination of the ears, nose, mouth and throat - examination of the oral cavity reveals normal lips, teeth, gums and oral mucosa and hard and soft palates, tongue, tonsils and posterior pharynx are moist and symmetric without lesions.    Chest and Lung Exam  Inspection: Shape - Symmetric. Movements - Symmetrical. Accessory muscles - No use of accessory muscles in breathing.  Percussion:  Quality and Intensity: - Percussion normal.  Palpation: - Palpation normal.  Auscultation:  Breath sounds: - Normal and equal bilaterally.  Adventitious sounds: - none bilaterally.  Right IJ hemodialysis catheter present right upper chest with intact dry dressing    Cardiovascular  Inspection: Carotid artery - Bilateral - Inspection Normal.  Palpation/Percussion: Examination by palpation and percussion reveals - No Thrills.  Cardiac Borders - Normal.  Auscultation: Rhythm -  Regular. Rate: Regular.  Heart Sounds - Normal heart sounds, S1 WNL and S2 WNL.  Murmurs & Other Heart Sounds: Auscultation of the heart reveals - No Murmurs or other extra heart sounds.    Abdomen  Inspection: Inspection of the abdomen reveals - No Hernias.  Palpation/Percussion: Palpation and Percussion of the abdomen reveal - Non Tender and No Palpable abdominal  masses.  Liver: - Normal.  Spleen: - Normal.  Auscultation: Auscultation of the abdomen reveals - Bowel sounds normal.  Surgical scars: Faint laparoscopic  No evidence of abdominal wall hernia    Inguinal  No inguinal or femoral hernias or lymphadenopathy bilaterally.    Rectal - Did not examine.    Peripheral Vascular  Lower Extremity: Inspection - Bilateral - No Varicose veins.  Palpation: Edema - Bilateral - No edema.    Musculoskeletal  Global Assessment  Right Upper Extremity - no deformities, masses or tenderness, no known fractures, normal strength and tone and  normal range of motion without pain. Left Upper Extremity - no deformities, masses or tenderness, no known fractures,  normal strength and tone and normal range of motion without pain. Right Lower Extremity - no deformities, masses or  tenderness, no known fractures, normal strength and tone and normal range of motion without pain. Left Lower  Extremity - no deformities, masses or tenderness, no known fractures, normal strength and tone and normal range of  motion without pain.    Lymphatic  Head & Neck  General Head & Neck Lymphatics:  Bilateral: Description - Normal.  Axillary  General Axillary Region:  Bilateral: Description - Normal.  Femoral & Inguinal  Generalized Femoral & Inguinal Lymphatics:  Bilateral: Description - Normal.      Assessment/Plan      Ms. Barillas is a good candidate for insertion of peritoneal dialysis catheter.        We discussed the benefits of peritoneal dialysis catheter, the operation, and expectations postoperatively.     Recommendations:     Will proceed  with laparoscopic insertion of a peritoneal dialysis catheter with possible omentopexy on Thursday, 6/26/2025 under general anesthesia at [facility] as an outpatient.  ([id number])    The patient's postoperative appointment will be Wednesday, 7/9/2025 at my [facility] office.     Recent EKG and transthoracic echocardiogram were satisfactory as noted above in the HPI.  Will obtain preoperative cardiac clearance from Dr. Ma.  Patient will need to stop her Plavix 5 days preoperatively.     Will obtain preoperative CBC and BMP.  Will obtain stat BMP upon early arrival to preop holding if necessary.     Postoperative analgesia will be discussed the day of the operation.     Bowel Preparation:     Constipation can cause dysfunction of the peritoneal dialysis catheter.     Therefore, it is important to perform a limited preoperative bowel preparation to clear the colon of residual fecal material.       Therefore, the following pre-and postoperative bowel preparation is recommended:     1.  1-2 days prior to the operation, please take orally 2 L of GoLYTELY bowel preparation solution.  This has been prescribed to your pharmacy.  You may eat your regular diet that day. You do not need to be on a clear liquid diet.     2.  Nothing to eat or drink after midnight, the night prior to the operation.     3.  Following the operation, take 30 cc (1 fluid ounce)  of 70% sorbitol solution once daily, until the first bowel movement occurs postoperatively (may be as long as 4 days).  You will receive a prescription for the sorbitol solution at your preoperative visit.     I will see the patient for a postoperative visit at my [facility] office on Tuesday, 6/3/2025.     Peritoneal Dialysis Catheter Consent:  The procedure was explained to the patient in detail, including the risks, benefits and alternatives. The risks include, but are not limited to, infection, bleeding, hematoma, seroma, need for further surgery, poor wound  healing, peritonitis, infection, dysfunction of the catheter requiring removal or revision.  The patient agreed with the plan and signed the electronic consent.         [1]   Past Medical History:  Diagnosis Date    Arteriosclerosis of coronary artery 08/24/2023    Cataract     Chronic kidney disease, stage 3b (Multi) 03/21/2022    Stage 3b chronic kidney disease    Coronary artery disease involving native coronary artery of native heart without angina pectoris 11/30/2021    Formatting of this note might be different from the original. Impression: 1. There is severe diffuse disease in the small ramus branch. 2. Mild diffuse disease in the LAD and RCA. Recommended Treatment: Medical Therapy Last Assessment & Plan: Formatting of this note might be different from the original. Assessment: NSTEMI 11/2021 Impression: 1. There is severe diffuse disease in the small ramus br    Diabetic retinopathy (Multi)     Hydronephrosis 09/11/2022    Last Assessment & Plan: Formatting of this note might be different from the original. Scheduled for surgery on 9/29/2022    Obstruction of ureter 02/23/2024    Personal history of other endocrine, nutritional and metabolic disease 03/10/2022    History of vitamin D deficiency    Pyuria 09/11/2022    Retinal detachment     Sepsis due to Escherichia coli (Multi) 10/11/2022    Last Assessment & Plan: Formatting of this note might be different from the original. +sepsis d/t E.Coli Pt states that she was hospitalized in 10/9/2022 for UTI, she was given IV antibiotics and most recent UC was negative. She denies any LUTS. Surgeon has ordered another UC, will obtain at pacc appt.    Ureteral stricture, right 12/07/2022    Last Assessment & Plan: Formatting of this note might be different from the original. Scheduled for surgery on 12/22/2022    Urinary tract infection 08/24/2023    Uterine leiomyoma 08/08/2022   [2]   Past Surgical History:  Procedure Laterality Date    INVASIVE VASCULAR  PROCEDURE Right 2/13/2025    Procedure: Tunnel Dialysis Catheter Exchange;  Surgeon: Nabeel Padron DO;  Location: [facility];  Service: Vascular Surgery;  Laterality: Right;  Permacath right chest    IR CVC PICC  10/20/2022    IR CVC PICC    OTHER SURGICAL HISTORY  03/30/2022    No history of surgery    RETINAL LASER PROCEDURE Left     PRP   [3]   Allergies  Allergen Reactions    Sulfamethoxazole-Trimethoprim Angioedema   [4]   Family History  Problem Relation Name Age of Onset    Heart attack Mother     [5]   Social History  Tobacco Use    Smoking status: Never     Passive exposure: Never    Smokeless tobacco: Never   Vaping Use    Vaping status: Never Used   Substance Use Topics    Alcohol use: Never    Drug use: Never

## 2025-06-03 NOTE — H&P (VIEW-ONLY)
History Of Present Illness :  Yola Barillas is a 55 y.o. female, patient of [facility],  who presents on referral from her neurologist, Dr. Chinedu Bryant, for consideration of insertion of peritoneal dialysis catheter.    The patient has known chronic kidney disease stage IV/V.  The patient was recently admitted to [facility] from February 11 through February 19 for worsening renal failure and symptoms thereof.  Please see the discharge summary and other documentation for details.  A right internal jugular tunneled hemodialysis catheter was placed by Dr. Padron from vascular surgery on 2/13/2025, and the patient instituted hemodialysis thereafter.  Upon discharge, the patient has continued to dialyze at [facility] q. Tuesday/Thursday/Saturday initially, and is now Monday/Wednesday/Friday.    Of note, the patient did see Dr. Padron in the office on 4/17/2025 with regard to permanent hemodialysis access.  That note was reviewed.  Based on the vein mapping, the patient is not a candidate for natural fistula and Dr. Padron's recommendation was to consider peritoneal dialysis.  He also recommended considering transplantation.    The patient's other medical problems include but not limited to anemia, NSTEMI, HLD, CVA, depression with suicidal ideation and T2DM.  Renal failure secondary to hypertensive and diabetic nephropathy also history of obstructive uropathy with right ureteral stenosis status post stenting.     The patient has seen Dr. Marcos Ma cardiology in the past, most recently on 1/8/2025.  That note was reviewed.  The patient carries the diagnoses of coronary artery disease, history of a non-ST elevation MI, and hyperlipidemia she is on Plavix 75 mg p.o. daily.    Transthoracic echocardiography on 1/22/2025 revealed the following:  CONCLUSIONS:   1. Left ventricular ejection fraction is normal, by visual estimate at 65-70%.   2. Spectral Doppler shows a Grade I (impaired relaxation  pattern) of left ventricular diastolic filling with normal left atrial filling pressure.   3. There is moderately increased septal and moderately increased posterior left ventricular wall thickness.   4. There is moderate concentric left ventricular hypertrophy.   5. There is normal right ventricular global systolic function.   6. The mitral valve is moderately thickened with a myxomatous appearance.   7. Mild to moderate mitral valve regurgitation.   8. Mild aortic valve stenosis.    Most recent EKG on 2/11/2025 revealed the following:  Normal sinus rhythm  Left anterior fascicular block  Abnormal ECG  When compared with ECG of 28-JAN-2025 21:16,  PREVIOUS ECG IS PRESENT  See ED provider note for full interpretation and clinical correlation  Confirmed by Mely Ahumada ([id number]) on 2/27/2025 9:16:27 PM    Past abdominal surgical history includes only the following procedure at [facility] on 10/19/2022:  Laparoscopic modified radical hysterectomy, bilateral salpingectomy, cystoscopy, ureteral stent removal; right ureteral stent replacement     The patient is single.  She has no children.  She lives in an apartment in [place].  She has been on disability for the last 2 years.  She has 4 brothers in the area that do look after her.    Past Medical History   Medical History[1]     Surgical History    Surgical History[2]     Allergies   RX Allergies[3]     Home Meds  Current Outpatient Medications   Medication Instructions    acetaminophen (TYLENOL) 500 mg, Every 6 hours PRN    amLODIPine (NORVASC) 5 mg, oral, Daily    aspirin 81 mg chewable tablet 1 tablet, Daily    bisacodyl (DULCOLAX) 5 mg, oral, Daily PRN    blood sugar diagnostic (Accu-Chek Guide test strips) strip Use to test blood sugar once a day    blood-glucose meter (Accu-Chek Guide Glucose Meter) misc Use to test blood sugar once a day    buPROPion SR (WELLBUTRIN SR) 200 mg, oral, Daily    carvedilol (COREG) 12.5 mg, oral, 2 times daily    clopidogrel (PLAVIX) 75  mg, oral, Daily    docusate sodium (COLACE) 100 mg, oral, 2 times daily PRN    ferrous sulfate, 325 mg ferrous sulfate, (FeroSuL) tablet TAKE 1 TABLET (325 MG) BY MOUTH ONCE DAILY WITH BREAKFAST.    folic acid (FOLVITE) 1 mg, oral, Daily    lancets (Accu-Chek Fastclix Lancet Drum) misc Use to test blood sugar once a day    pantoprazole (PROTONIX) 20 mg, oral, Daily before breakfast        Family History    Family History[4]     Social History  Social History[5]     Review Of Systems    Review of Systems    General: Not Present- Obesity, Cancer, HIV, MRSA, Recent Cold/Flu, Tired During the Day and VRE.  HEENT: Not Present- Migraine, Cataracts, Glaucoma, Macular Degeneration and Retinal Detachment.  Respiratory: Not Present- Asthma, Chronic Cough, Difficulty Breathing on Exertion, Difficulty Breathing at Rest, Emphysema, Frequent Bronchitis, Home CPAP/BiPAP, Home Oxygen, Pulmonary Embolus, Pneumonia/TB, Sleep Apnea and Snoring.  Cardiovascular: Not Present- Chest Pain, Congestive Heart Failure, Heart Attack, Coronary Artery Disease, Heart Stent, High Cholesterol/Lipids,  Internal Defibrillator, Irregular Heart Beat, Mitral Valve Prolapse, Murmur, Pacemaker and Peripheral Vascular Disease.  Gastrointestinal: Not Present- Heartburn, Hepatitis, Hiatal Hernia, Jaundice, Reflux, Stomach Ulcer and IBS.  Female Genitourinary: Not Present- Kidney Failure, Kidney Stones, Dialysis and Urinary Tract Infection.  Musculoskeletal: Not Present- Arthritis, Back Pain and Fibromyalgia.  Neurological: Not Present- Headaches, Numbness, Tingling, Seizures, Stroke,  Shunt and Weakness.  Psychiatric: Not Present- Anxiety, Bipolar and Panic Attacks.  Endocrine: Not Present- Hyperthyroidism, Hypothyroidism and Low Blood Sugar.  Hematology: Not Present- Abnormal Bleeding and Blood Clots.    Vitals  There were no vitals taken for this visit.     Physical Exam   General Complete Physical Exam    The physical exam findings are as  follows:    General Appearance - Cooperative and Well groomed. Not in acute distress. Build & Nutrition - Well nourished.  Posture - Normal posture. Gait - Normal. Hydration - Well hydrated.    Integumentary  General Characteristics: Overall examination of the patient's skin reveals - no rashes, no suspicious lesions, no bruises and no evidence of scars. Color - normal coloration of skin. Skin Moisture - normal skin moisture. Temperature - normal  warmth is noted. Texture - normal skin texture.    Head and Neck  Head - normocephalic, atraumatic with no lesions or palpable masses.  Neck  Global Assessment - full range of motion. no bruit auscultated on the right, no bruit auscultated on the left, non-tender, no lymphadenopathy, no palpable mass on the right and no palpable mass on the left.  Trachea - midline.  Thyroid Gland Characteristics - no palpable nodules, normal position, symmetric and smooth.    Eyes  Sclera/Conjunctiva - Bilateral - Normal. Pupil - Bilateral - Accommodating, Direct reaction to light normal and Equal.    ENMT  Global Assessment  Upon examination of the ears, nose, mouth and throat - examination of the oral cavity reveals normal lips, teeth, gums and oral mucosa and hard and soft palates, tongue, tonsils and posterior pharynx are moist and symmetric without lesions.    Chest and Lung Exam  Inspection: Shape - Symmetric. Movements - Symmetrical. Accessory muscles - No use of accessory muscles in breathing.  Percussion:  Quality and Intensity: - Percussion normal.  Palpation: - Palpation normal.  Auscultation:  Breath sounds: - Normal and equal bilaterally.  Adventitious sounds: - none bilaterally.  Right IJ hemodialysis catheter present right upper chest with intact dry dressing    Cardiovascular  Inspection: Carotid artery - Bilateral - Inspection Normal.  Palpation/Percussion: Examination by palpation and percussion reveals - No Thrills.  Cardiac Borders - Normal.  Auscultation: Rhythm -  Regular. Rate: Regular.  Heart Sounds - Normal heart sounds, S1 WNL and S2 WNL.  Murmurs & Other Heart Sounds: Auscultation of the heart reveals - No Murmurs or other extra heart sounds.    Abdomen  Inspection: Inspection of the abdomen reveals - No Hernias.  Palpation/Percussion: Palpation and Percussion of the abdomen reveal - Non Tender and No Palpable abdominal  masses.  Liver: - Normal.  Spleen: - Normal.  Auscultation: Auscultation of the abdomen reveals - Bowel sounds normal.  Surgical scars: Faint laparoscopic  No evidence of abdominal wall hernia    Inguinal  No inguinal or femoral hernias or lymphadenopathy bilaterally.    Rectal - Did not examine.    Peripheral Vascular  Lower Extremity: Inspection - Bilateral - No Varicose veins.  Palpation: Edema - Bilateral - No edema.    Musculoskeletal  Global Assessment  Right Upper Extremity - no deformities, masses or tenderness, no known fractures, normal strength and tone and  normal range of motion without pain. Left Upper Extremity - no deformities, masses or tenderness, no known fractures,  normal strength and tone and normal range of motion without pain. Right Lower Extremity - no deformities, masses or  tenderness, no known fractures, normal strength and tone and normal range of motion without pain. Left Lower  Extremity - no deformities, masses or tenderness, no known fractures, normal strength and tone and normal range of  motion without pain.    Lymphatic  Head & Neck  General Head & Neck Lymphatics:  Bilateral: Description - Normal.  Axillary  General Axillary Region:  Bilateral: Description - Normal.  Femoral & Inguinal  Generalized Femoral & Inguinal Lymphatics:  Bilateral: Description - Normal.      Assessment/Plan      Ms. Barillas is a good candidate for insertion of peritoneal dialysis catheter.        We discussed the benefits of peritoneal dialysis catheter, the operation, and expectations postoperatively.     Recommendations:     Will proceed  with laparoscopic insertion of a peritoneal dialysis catheter with possible omentopexy on Thursday, 6/26/2025 under general anesthesia at [facility] as an outpatient.  ([id number] [phone] [id number])    The patient's postoperative appointment will be Wednesday, 7/9/2025 at my [facility] office.     Recent EKG and transthoracic echocardiogram were satisfactory as noted above in the HPI.  Will obtain preoperative cardiac clearance from Dr. Ma.  Patient will need to stop her Plavix 5 days preoperatively.     Will obtain preoperative CBC and BMP.  Will obtain stat BMP upon early arrival to preop holding if necessary.     Postoperative analgesia will be discussed the day of the operation.     Bowel Preparation:     Constipation can cause dysfunction of the peritoneal dialysis catheter.     Therefore, it is important to perform a limited preoperative bowel preparation to clear the colon of residual fecal material.       Therefore, the following pre-and postoperative bowel preparation is recommended:     1.  1-2 days prior to the operation, please take orally 2 L of GoLYTELY bowel preparation solution.  This has been prescribed to your pharmacy.  You may eat your regular diet that day. You do not need to be on a clear liquid diet.     2.  Nothing to eat or drink after midnight, the night prior to the operation.     3.  Following the operation, take 30 cc (1 fluid ounce)  of 70% sorbitol solution once daily, until the first bowel movement occurs postoperatively (may be as long as 4 days).  You will receive a prescription for the sorbitol solution at your preoperative visit.     I will see the patient for a postoperative visit at my [facility] office on Wednesday, 7/9/2025.     Peritoneal Dialysis Catheter Consent:  The procedure was explained to the patient in detail, including the risks, benefits and alternatives. The risks include, but are not limited to, infection, bleeding, hematoma, seroma, need for further surgery, poor wound  healing, peritonitis, infection, dysfunction of the catheter requiring removal or revision.  The patient agreed with the plan and signed the electronic consent.         [1]   Past Medical History:  Diagnosis Date    Arteriosclerosis of coronary artery 08/24/2023    Cataract     Chronic kidney disease, stage 3b (Multi) 03/21/2022    Stage 3b chronic kidney disease    Coronary artery disease involving native coronary artery of native heart without angina pectoris 11/30/2021    Formatting of this note might be different from the original. Impression: 1. There is severe diffuse disease in the small ramus branch. 2. Mild diffuse disease in the LAD and RCA. Recommended Treatment: Medical Therapy Last Assessment & Plan: Formatting of this note might be different from the original. Assessment: NSTEMI 11/2021 Impression: 1. There is severe diffuse disease in the small ramus br    Diabetic retinopathy (Multi)     Hydronephrosis 09/11/2022    Last Assessment & Plan: Formatting of this note might be different from the original. Scheduled for surgery on 9/29/2022    Obstruction of ureter 02/23/2024    Personal history of other endocrine, nutritional and metabolic disease 03/10/2022    History of vitamin D deficiency    Pyuria 09/11/2022    Retinal detachment     Sepsis due to Escherichia coli (Multi) 10/11/2022    Last Assessment & Plan: Formatting of this note might be different from the original. +sepsis d/t E.Coli Pt states that she was hospitalized in 10/9/2022 for UTI, she was given IV antibiotics and most recent UC was negative. She denies any LUTS. Surgeon has ordered another UC, will obtain at pacc appt.    Ureteral stricture, right 12/07/2022    Last Assessment & Plan: Formatting of this note might be different from the original. Scheduled for surgery on 12/22/2022    Urinary tract infection 08/24/2023    Uterine leiomyoma 08/08/2022   [2]   Past Surgical History:  Procedure Laterality Date    INVASIVE VASCULAR  PROCEDURE Right 2/13/2025    Procedure: Tunnel Dialysis Catheter Exchange;  Surgeon: Nabeel Padron DO;  Location: [facility] Cardiac [facility] Lab;  Service: Vascular Surgery;  Laterality: Right;  Permacath right chest    IR CVC PICC  10/20/2022    IR CVC PICC    OTHER SURGICAL HISTORY  03/30/2022    No history of surgery    RETINAL LASER PROCEDURE Left     PRP   [3]   Allergies  Allergen Reactions    Sulfamethoxazole-Trimethoprim Angioedema   [4]   Family History  Problem Relation Name Age of Onset    Heart attack Mother     [5]   Social History  Tobacco Use    Smoking status: Never     Passive exposure: Never    Smokeless tobacco: Never   Vaping Use    Vaping status: Never Used   Substance Use Topics    Alcohol use: Never    Drug use: Never

## 2025-06-04 ENCOUNTER — TELEPHONE (OUTPATIENT)
Dept: SURGERY | Facility: CLINIC | Age: 55
End: 2025-06-04

## 2025-06-04 ENCOUNTER — HOSPITAL ENCOUNTER (OUTPATIENT)
Dept: RADIOLOGY | Facility: HOSPITAL | Age: 55
Discharge: HOME | End: 2025-06-04
Payer: MEDICARE

## 2025-06-04 ENCOUNTER — PREP FOR PROCEDURE (OUTPATIENT)
Dept: SURGERY | Facility: HOSPITAL | Age: 55
End: 2025-06-04

## 2025-06-04 ENCOUNTER — APPOINTMENT (OUTPATIENT)
Dept: SURGERY | Facility: CLINIC | Age: 55
End: 2025-06-04
Payer: MEDICARE

## 2025-06-04 VITALS
SYSTOLIC BLOOD PRESSURE: 132 MMHG | OXYGEN SATURATION: 97 % | HEIGHT: 64 IN | DIASTOLIC BLOOD PRESSURE: 86 MMHG | BODY MASS INDEX: 28.17 KG/M2 | RESPIRATION RATE: 17 BRPM | HEART RATE: 90 BPM | WEIGHT: 165 LBS | TEMPERATURE: 97.4 F

## 2025-06-04 DIAGNOSIS — E04.2 MULTIPLE THYROID NODULES: ICD-10-CM

## 2025-06-04 DIAGNOSIS — E04.9 NODULAR GOITER: Primary | ICD-10-CM

## 2025-06-04 DIAGNOSIS — N18.5 CHRONIC KIDNEY DISEASE, STAGE V (MULTI): Primary | ICD-10-CM

## 2025-06-04 PROCEDURE — 3008F BODY MASS INDEX DOCD: CPT | Performed by: SURGERY

## 2025-06-04 PROCEDURE — 3075F SYST BP GE 130 - 139MM HG: CPT | Performed by: SURGERY

## 2025-06-04 PROCEDURE — 76536 US EXAM OF HEAD AND NECK: CPT

## 2025-06-04 PROCEDURE — 76536 US EXAM OF HEAD AND NECK: CPT | Performed by: RADIOLOGY

## 2025-06-04 PROCEDURE — 99205 OFFICE O/P NEW HI 60 MIN: CPT | Performed by: SURGERY

## 2025-06-04 PROCEDURE — 3079F DIAST BP 80-89 MM HG: CPT | Performed by: SURGERY

## 2025-06-04 RX ORDER — SODIUM CHLORIDE 9 MG/ML
20 INJECTION, SOLUTION INTRAVENOUS CONTINUOUS
OUTPATIENT
Start: 2025-06-04 | End: 2025-06-05

## 2025-06-04 RX ORDER — POLYETHYLENE GLYCOL 3350, SODIUM SULFATE ANHYDROUS, SODIUM BICARBONATE, SODIUM CHLORIDE, POTASSIUM CHLORIDE 236; 22.74; 6.74; 5.86; 2.97 G/4L; G/4L; G/4L; G/4L; G/4L
POWDER, FOR SOLUTION ORAL
Qty: 4000 ML | Refills: 0 | Status: SHIPPED | OUTPATIENT
Start: 2025-06-04

## 2025-06-04 NOTE — TELEPHONE ENCOUNTER
Patient did not have her [employer] Card with her.  It was last scanned in 1/2025, patient stated it is still active, just misplaced her card.  Thank you

## 2025-06-14 LAB
ALBUMIN SERPL-MCNC: 4.1 G/DL (ref 3.6–5.1)
BUN SERPL-MCNC: 22 MG/DL (ref 7–25)
BUN/CREAT SERPL: 6 (CALC) (ref 6–22)
CALCIUM SERPL-MCNC: 8.7 MG/DL (ref 8.6–10.4)
CHLORIDE SERPL-SCNC: 95 MMOL/L (ref 98–110)
CO2 SERPL-SCNC: 32 MMOL/L (ref 20–32)
CREAT SERPL-MCNC: 3.96 MG/DL (ref 0.5–1.03)
EGFRCR SERPLBLD CKD-EPI 2021: 13 ML/MIN/1.73M2
ERYTHROCYTE [DISTWIDTH] IN BLOOD BY AUTOMATED COUNT: 12.8 % (ref 11–15)
GLUCOSE SERPL-MCNC: 150 MG/DL (ref 65–99)
HCT VFR BLD AUTO: 39.8 % (ref 35–45)
HGB BLD-MCNC: 12.6 G/DL (ref 11.7–15.5)
MCH RBC QN AUTO: 29 PG (ref 27–33)
MCHC RBC AUTO-ENTMCNC: 31.7 G/DL (ref 32–36)
MCV RBC AUTO: 91.7 FL (ref 80–100)
PHOSPHATE SERPL-MCNC: 4 MG/DL (ref 2.5–4.5)
PLATELET # BLD AUTO: 181 THOUSAND/UL (ref 140–400)
PMV BLD REES-ECKER: 10.1 FL (ref 7.5–12.5)
POTASSIUM SERPL-SCNC: 4.6 MMOL/L (ref 3.5–5.3)
RBC # BLD AUTO: 4.34 MILLION/UL (ref 3.8–5.1)
SODIUM SERPL-SCNC: 137 MMOL/L (ref 135–146)
WBC # BLD AUTO: 6.9 THOUSAND/UL (ref 3.8–10.8)

## 2025-06-18 ENCOUNTER — TELEPHONE (OUTPATIENT)
Dept: SURGERY | Facility: CLINIC | Age: 55
End: 2025-06-18
Payer: MEDICARE

## 2025-06-18 NOTE — TELEPHONE ENCOUNTER
Pt called with some questions about surgery, they are wondering about the liquid they're supposed to take after procedure, and if they should take it before surgery as well. And if she is allowed to be dropped off and picked up by [employer]    Thank you

## 2025-06-19 NOTE — TELEPHONE ENCOUNTER
Spoke with patient and states she can not arrive in a lyft by herself.   Regarding her bowel prep that is done the day before surgery. Her other solution will be done after surgery.

## 2025-06-23 NOTE — PREPROCEDURE INSTRUCTIONS
Current Medications    Medication Instructions    acetaminophen (Tylenol) 500 mg tablet Continue as prescribed if needed     amLODIPine (Norvasc) 5 mg tablet Take morning of surgery with sip of water    bisacodyl (Dulcolax) 5 mg EC tablet Continue until night before surgery     buPROPion SR (Wellbutrin SR) 200 mg 12 hr tablet Take morning of surgery with sip of water    carvedilol (Coreg) 12.5 mg tablet Take morning of surgery with sip of water    clopidogrel (Plavix) 75 mg tablet Hold 5 days pre-op. Last dose 6/21  per pt.    docusate sodium (Colace) 100 mg capsule Continue until night before surgery     folic acid (Folvite) 1 mg tablet Hold starting today     pantoprazole (ProtoNix) 20 mg EC tablet Take morning of surgery with sip of water    polyethylene glycol (GaviLyte-G) 236-22.74-6.74 -5.86 gram solution To be done the day before surgery per directions           NPO Instructions:    Do not eat any food after midnight the night before your surgery/procedure.  You may have 13 ounces of clear liquids until TWO hours before ARRIVAL time to the hospital the day of surgery. This includes water, black tea/coffee, (no milk or cream) apple juice and electrolyte drinks (Gatorade-no red colors).  You may chew gum up to TWO hours before your surgery/procedure.    Additional Instructions:     The day before surgery, you will be receiving a phone call from the surgery schedulers with your times for surgery. If you don't receive a call by 2pm, please call [phone].    Enter through the main entrance of [facility], located at [address]. Proceed to registration, located on the right hand side of the staircase. You will need your ID and insurance card for registration. Please ensure you have a responsible adult to drive you home. A responsible adult DOES NOT include rideshare service drivers ([employer], [employer], etc), cab drivers, or public transportation drivers, but “provide a ride” is acceptable.    Take a  shower before your procedure. After your shower avoid lotions, powders, deodorants or anything applied to the skin. If you wear contacts or glasses, wear the glasses. If you do not have glasses, please bring a case for your contacts. You may wear hearing aids and dentures, bring a case for them or we will provide one. Make sure you wear something loose and comfortable. Keep in mind your surgical procedure and wear something that will accommodate incisions or bandages. Please remove all jewelry and piercing's.     For further questions Kelli RODRIGUEZ can be contacted at [phone] between 7AM-3PM.

## 2025-06-26 ENCOUNTER — ANESTHESIA (OUTPATIENT)
Dept: OPERATING ROOM | Facility: HOSPITAL | Age: 55
End: 2025-06-26
Payer: MEDICARE

## 2025-06-26 ENCOUNTER — ANESTHESIA EVENT (OUTPATIENT)
Dept: OPERATING ROOM | Facility: HOSPITAL | Age: 55
End: 2025-06-26
Payer: MEDICARE

## 2025-06-26 ENCOUNTER — HOSPITAL ENCOUNTER (OUTPATIENT)
Facility: HOSPITAL | Age: 55
Setting detail: OUTPATIENT SURGERY
Discharge: HOME | End: 2025-06-26
Attending: SURGERY | Admitting: SURGERY
Payer: MEDICARE

## 2025-06-26 ENCOUNTER — PHARMACY VISIT (OUTPATIENT)
Dept: PHARMACY | Facility: CLINIC | Age: 55
End: 2025-06-26
Payer: COMMERCIAL

## 2025-06-26 VITALS
SYSTOLIC BLOOD PRESSURE: 166 MMHG | HEART RATE: 90 BPM | HEIGHT: 64 IN | DIASTOLIC BLOOD PRESSURE: 88 MMHG | RESPIRATION RATE: 16 BRPM | TEMPERATURE: 96.8 F | BODY MASS INDEX: 27.85 KG/M2 | OXYGEN SATURATION: 98 % | WEIGHT: 163.14 LBS

## 2025-06-26 DIAGNOSIS — N18.5 CHRONIC KIDNEY DISEASE, STAGE V (MULTI): ICD-10-CM

## 2025-06-26 DIAGNOSIS — K59.09 CHRONIC CONSTIPATION: ICD-10-CM

## 2025-06-26 DIAGNOSIS — N18.5 CHRONIC KIDNEY DISEASE, STAGE V (MULTI): Primary | ICD-10-CM

## 2025-06-26 LAB
ANION GAP SERPL CALC-SCNC: 20 MMOL/L (ref 10–20)
BUN SERPL-MCNC: 61 MG/DL (ref 6–23)
CALCIUM SERPL-MCNC: 8.6 MG/DL (ref 8.6–10.3)
CHLORIDE SERPL-SCNC: 98 MMOL/L (ref 98–107)
CO2 SERPL-SCNC: 27 MMOL/L (ref 21–32)
CREAT SERPL-MCNC: 8.23 MG/DL (ref 0.5–1.05)
EGFRCR SERPLBLD CKD-EPI 2021: 5 ML/MIN/1.73M*2
GLUCOSE BLD MANUAL STRIP-MCNC: 126 MG/DL (ref 74–99)
GLUCOSE BLD MANUAL STRIP-MCNC: 153 MG/DL (ref 74–99)
GLUCOSE SERPL-MCNC: 127 MG/DL (ref 74–99)
POTASSIUM SERPL-SCNC: 4.2 MMOL/L (ref 3.5–5.3)
SODIUM SERPL-SCNC: 141 MMOL/L (ref 136–145)

## 2025-06-26 PROCEDURE — 3600000003 HC OR TIME - INITIAL BASE CHARGE - PROCEDURE LEVEL THREE: Performed by: SURGERY

## 2025-06-26 PROCEDURE — 7100000002 HC RECOVERY ROOM TIME - EACH INCREMENTAL 1 MINUTE: Performed by: SURGERY

## 2025-06-26 PROCEDURE — 80048 BASIC METABOLIC PNL TOTAL CA: CPT | Performed by: SURGERY

## 2025-06-26 PROCEDURE — 3600000008 HC OR TIME - EACH INCREMENTAL 1 MINUTE - PROCEDURE LEVEL THREE: Performed by: SURGERY

## 2025-06-26 PROCEDURE — 7100000009 HC PHASE TWO TIME - INITIAL BASE CHARGE: Performed by: SURGERY

## 2025-06-26 PROCEDURE — 2500000004 HC RX 250 GENERAL PHARMACY W/ HCPCS (ALT 636 FOR OP/ED): Performed by: ANESTHESIOLOGY

## 2025-06-26 PROCEDURE — A49324 PR LAP INSERTION TUNNELED INTRAPERITONEAL CATHETER: Performed by: ANESTHESIOLOGY

## 2025-06-26 PROCEDURE — 3700000002 HC GENERAL ANESTHESIA TIME - EACH INCREMENTAL 1 MINUTE: Performed by: SURGERY

## 2025-06-26 PROCEDURE — 2500000004 HC RX 250 GENERAL PHARMACY W/ HCPCS (ALT 636 FOR OP/ED): Performed by: SURGERY

## 2025-06-26 PROCEDURE — 49324 LAP INSERT TUNNEL IP CATH: CPT | Performed by: SURGERY

## 2025-06-26 PROCEDURE — 2720000007 HC OR 272 NO HCPCS: Performed by: SURGERY

## 2025-06-26 PROCEDURE — RXMED WILLOW AMBULATORY MEDICATION CHARGE

## 2025-06-26 PROCEDURE — 82947 ASSAY GLUCOSE BLOOD QUANT: CPT

## 2025-06-26 PROCEDURE — 3700000001 HC GENERAL ANESTHESIA TIME - INITIAL BASE CHARGE: Performed by: SURGERY

## 2025-06-26 PROCEDURE — 36415 COLL VENOUS BLD VENIPUNCTURE: CPT | Performed by: SURGERY

## 2025-06-26 PROCEDURE — 7100000010 HC PHASE TWO TIME - EACH INCREMENTAL 1 MINUTE: Performed by: SURGERY

## 2025-06-26 PROCEDURE — 7100000001 HC RECOVERY ROOM TIME - INITIAL BASE CHARGE: Performed by: SURGERY

## 2025-06-26 PROCEDURE — 2500000005 HC RX 250 GENERAL PHARMACY W/O HCPCS: Performed by: SURGERY

## 2025-06-26 RX ORDER — FENTANYL CITRATE 50 UG/ML
50 INJECTION, SOLUTION INTRAMUSCULAR; INTRAVENOUS EVERY 5 MIN PRN
Status: DISCONTINUED | OUTPATIENT
Start: 2025-06-26 | End: 2025-06-26 | Stop reason: HOSPADM

## 2025-06-26 RX ORDER — TRAMADOL HYDROCHLORIDE 50 MG/1
50 TABLET, FILM COATED ORAL EVERY 8 HOURS PRN
Qty: 12 TABLET | Refills: 0 | Status: SHIPPED | OUTPATIENT
Start: 2025-06-26

## 2025-06-26 RX ORDER — ONDANSETRON HYDROCHLORIDE 2 MG/ML
INJECTION, SOLUTION INTRAVENOUS AS NEEDED
Status: DISCONTINUED | OUTPATIENT
Start: 2025-06-26 | End: 2025-06-26

## 2025-06-26 RX ORDER — PROPOFOL 10 MG/ML
INJECTION, EMULSION INTRAVENOUS AS NEEDED
Status: DISCONTINUED | OUTPATIENT
Start: 2025-06-26 | End: 2025-06-26

## 2025-06-26 RX ORDER — FENTANYL CITRATE 50 UG/ML
INJECTION, SOLUTION INTRAMUSCULAR; INTRAVENOUS AS NEEDED
Status: DISCONTINUED | OUTPATIENT
Start: 2025-06-26 | End: 2025-06-26

## 2025-06-26 RX ORDER — MIDAZOLAM HYDROCHLORIDE 1 MG/ML
INJECTION, SOLUTION INTRAMUSCULAR; INTRAVENOUS AS NEEDED
Status: DISCONTINUED | OUTPATIENT
Start: 2025-06-26 | End: 2025-06-26

## 2025-06-26 RX ORDER — MEPERIDINE HYDROCHLORIDE 50 MG/ML
12.5 INJECTION INTRAMUSCULAR; INTRAVENOUS; SUBCUTANEOUS EVERY 10 MIN PRN
Status: DISCONTINUED | OUTPATIENT
Start: 2025-06-26 | End: 2025-06-26 | Stop reason: HOSPADM

## 2025-06-26 RX ORDER — BISACODYL 5 MG
5 TABLET, DELAYED RELEASE (ENTERIC COATED) ORAL DAILY PRN
Qty: 30 TABLET | Refills: 0 | Status: SHIPPED | OUTPATIENT
Start: 2025-06-26

## 2025-06-26 RX ORDER — SODIUM CHLORIDE 9 MG/ML
20 INJECTION, SOLUTION INTRAVENOUS CONTINUOUS
Status: DISCONTINUED | OUTPATIENT
Start: 2025-06-26 | End: 2025-06-26 | Stop reason: HOSPADM

## 2025-06-26 RX ORDER — FENTANYL CITRATE 50 UG/ML
25 INJECTION, SOLUTION INTRAMUSCULAR; INTRAVENOUS EVERY 5 MIN PRN
Status: DISCONTINUED | OUTPATIENT
Start: 2025-06-26 | End: 2025-06-26 | Stop reason: HOSPADM

## 2025-06-26 RX ORDER — LIDOCAINE HYDROCHLORIDE 20 MG/ML
INJECTION, SOLUTION INFILTRATION; PERINEURAL AS NEEDED
Status: DISCONTINUED | OUTPATIENT
Start: 2025-06-26 | End: 2025-06-26

## 2025-06-26 RX ORDER — ROCURONIUM BROMIDE 10 MG/ML
INJECTION, SOLUTION INTRAVENOUS AS NEEDED
Status: DISCONTINUED | OUTPATIENT
Start: 2025-06-26 | End: 2025-06-26

## 2025-06-26 RX ORDER — CEFAZOLIN SODIUM 2 G/50ML
2 SOLUTION INTRAVENOUS ONCE
Status: COMPLETED | OUTPATIENT
Start: 2025-06-26 | End: 2025-06-26

## 2025-06-26 RX ORDER — SODIUM CHLORIDE 0.9 G/100ML
INJECTION, SOLUTION IRRIGATION AS NEEDED
Status: DISCONTINUED | OUTPATIENT
Start: 2025-06-26 | End: 2025-06-26 | Stop reason: HOSPADM

## 2025-06-26 RX ORDER — BUPIVACAINE HCL/EPINEPHRINE 0.25-.0005
VIAL (ML) INJECTION AS NEEDED
Status: DISCONTINUED | OUTPATIENT
Start: 2025-06-26 | End: 2025-06-26 | Stop reason: HOSPADM

## 2025-06-26 RX ORDER — SODIUM CHLORIDE, SODIUM LACTATE, POTASSIUM CHLORIDE, CALCIUM CHLORIDE 600; 310; 30; 20 MG/100ML; MG/100ML; MG/100ML; MG/100ML
100 INJECTION, SOLUTION INTRAVENOUS CONTINUOUS
Status: ACTIVE | OUTPATIENT
Start: 2025-06-26 | End: 2025-06-26

## 2025-06-26 RX ORDER — GLYCOPYRROLATE 0.2 MG/ML
INJECTION INTRAMUSCULAR; INTRAVENOUS AS NEEDED
Status: DISCONTINUED | OUTPATIENT
Start: 2025-06-26 | End: 2025-06-26

## 2025-06-26 RX ORDER — LIDOCAINE HYDROCHLORIDE 10 MG/ML
0.1 INJECTION, SOLUTION INFILTRATION; PERINEURAL ONCE
Status: DISCONTINUED | OUTPATIENT
Start: 2025-06-26 | End: 2025-06-26 | Stop reason: HOSPADM

## 2025-06-26 RX ADMIN — FENTANYL CITRATE 100 MCG: 50 INJECTION, SOLUTION INTRAMUSCULAR; INTRAVENOUS at 07:56

## 2025-06-26 RX ADMIN — SODIUM CHLORIDE: 900 INJECTION, SOLUTION INTRAVENOUS at 07:45

## 2025-06-26 RX ADMIN — SUGAMMADEX 200 MG: 100 INJECTION, SOLUTION INTRAVENOUS at 09:41

## 2025-06-26 RX ADMIN — ONDANSETRON 4 MG: 2 INJECTION, SOLUTION INTRAMUSCULAR; INTRAVENOUS at 08:13

## 2025-06-26 RX ADMIN — MIDAZOLAM 2 MG: 1 INJECTION INTRAMUSCULAR; INTRAVENOUS at 07:54

## 2025-06-26 RX ADMIN — ROCURONIUM BROMIDE 30 MG: 50 INJECTION, SOLUTION INTRAVENOUS at 07:55

## 2025-06-26 RX ADMIN — DEXAMETHASONE SODIUM PHOSPHATE 4 MG: 4 INJECTION, SOLUTION INTRAMUSCULAR; INTRAVENOUS at 08:13

## 2025-06-26 RX ADMIN — GLYCOPYRROLATE 0.2 MG: 0.2 INJECTION, SOLUTION INTRAMUSCULAR; INTRAVENOUS at 08:13

## 2025-06-26 RX ADMIN — ROCURONIUM BROMIDE 10 MG: 50 INJECTION, SOLUTION INTRAVENOUS at 09:13

## 2025-06-26 RX ADMIN — CEFAZOLIN SODIUM 2 G: 2 SOLUTION INTRAVENOUS at 08:06

## 2025-06-26 RX ADMIN — LIDOCAINE HYDROCHLORIDE 100 MG: 20 INJECTION, SOLUTION INFILTRATION; PERINEURAL at 07:55

## 2025-06-26 RX ADMIN — ROCURONIUM BROMIDE 10 MG: 50 INJECTION, SOLUTION INTRAVENOUS at 08:45

## 2025-06-26 RX ADMIN — FENTANYL CITRATE 25 MCG: 50 INJECTION INTRAMUSCULAR; INTRAVENOUS at 10:52

## 2025-06-26 RX ADMIN — PROPOFOL 100 MG: 10 INJECTION, EMULSION INTRAVENOUS at 07:55

## 2025-06-26 SDOH — HEALTH STABILITY: MENTAL HEALTH: CURRENT SMOKER: 0

## 2025-06-26 ASSESSMENT — PAIN - FUNCTIONAL ASSESSMENT
PAIN_FUNCTIONAL_ASSESSMENT: 0-10

## 2025-06-26 ASSESSMENT — PAIN DESCRIPTION - DESCRIPTORS
DESCRIPTORS: SORE
DESCRIPTORS: SORE

## 2025-06-26 ASSESSMENT — PAIN SCALES - GENERAL
PAINLEVEL_OUTOF10: 0 - NO PAIN
PAINLEVEL_OUTOF10: 5 - MODERATE PAIN
PAINLEVEL_OUTOF10: 0 - NO PAIN
PAIN_LEVEL: 0
PAINLEVEL_OUTOF10: 4
PAINLEVEL_OUTOF10: 0 - NO PAIN
PAINLEVEL_OUTOF10: 5 - MODERATE PAIN
PAINLEVEL_OUTOF10: 0 - NO PAIN

## 2025-06-26 ASSESSMENT — COLUMBIA-SUICIDE SEVERITY RATING SCALE - C-SSRS
1. IN THE PAST MONTH, HAVE YOU WISHED YOU WERE DEAD OR WISHED YOU COULD GO TO SLEEP AND NOT WAKE UP?: NO
6. HAVE YOU EVER DONE ANYTHING, STARTED TO DO ANYTHING, OR PREPARED TO DO ANYTHING TO END YOUR LIFE?: NO
2. HAVE YOU ACTUALLY HAD ANY THOUGHTS OF KILLING YOURSELF?: NO

## 2025-06-26 NOTE — ANESTHESIA PREPROCEDURE EVALUATION
Patient: Yola Barillas    Procedure Information       Date/Time: 06/26/25 0730    Procedure: LAPAROSCOPIC ASSISTED PERITONEAL DIALYSIS CATHETER INSERTION/ POSSIBLE LAPAROSCOPIC OMENTOPEXY - Laparoscopic-assisted insertion of peritoneal dialysis catheter; possible laparoscopic omentopexy; need [employer] if possible    Location: [facility] 09 / [facility]    Surgeons: Cirilo Gibson MD            Relevant Problems   Anesthesia   (+) Poor venous access      Cardiac   (+) CAD (coronary artery disease)   (+) HLD (hyperlipidemia)   (+) Primary hypertension      Neuro   (+) Cerebrovascular accident (CVA) due to vascular stenosis (Multi)   (+) Depression with suicidal ideation   (+) Episode of recurrent major depressive disorder      GI   (+) GERD (gastroesophageal reflux disease)      /Renal   (+) End stage renal disease (Multi)   (+) UTI (urinary tract infection)      Endocrine   (+) Class 1 obesity with body mass index (BMI) of 30.0 to 30.9 in adult   (+) Nodular goiter   (+) Type 2 diabetes mellitus with both eyes affected by proliferative retinopathy without macular edema, without long-term current use of insulin      Hematology   (+) Anemia      HEENT   (+) Poor vision      ID   (+) UTI (urinary tract infection)       Clinical information reviewed:    Allergies  Meds               NPO Detail:  NPO/Void Status  Date of Last Liquid: 06/25/25  Time of Last Liquid: 2100  Date of Last Solid: 06/25/25  Time of Last Solid: 2100         Physical Exam    Airway  Mallampati: III  TM distance: >3 FB  Neck ROM: full  Mouth opening: 3 or more finger widths     Cardiovascular - normal exam   Dental - normal exam     Pulmonary - normal exam   Abdominal - normal exam           Anesthesia Plan    History of general anesthesia?: yes  History of complications of general anesthesia?: no    ASA 3     general     The patient is not a current smoker.  Patient was previously instructed to abstain from smoking on day of  procedure.  Patient did not smoke on day of procedure.    intravenous induction   Postoperative pain plan includes opioids.  Trial extubation is planned.  Anesthetic plan and risks discussed with patient.  Use of blood products discussed with patient who consented to blood products.

## 2025-06-26 NOTE — ANESTHESIA POSTPROCEDURE EVALUATION
Patient: Yola Barillas    Procedure Summary       Date: 06/26/25 Room / Location: [facility] 09 / [facility]    Anesthesia Start: 0751 Anesthesia Stop: 0955    Procedure: LAPAROSCOPIC ASSISTED PERITONEAL DIALYSIS CATHETER INSERTION/ POSSIBLE LAPAROSCOPIC OMENTOPEXY (Abdomen) Diagnosis:       Chronic kidney disease, stage V (Multi)      (Chronic kidney disease, stage V (Multi) [N18.5])    Surgeons: Cirilo Gibson MD Responsible Provider: Luis Antonio Garcia MD    Anesthesia Type: general ASA Status: 3            Anesthesia Type: general    Vitals Value Taken Time   /84 06/26/25 09:55   Temp 36 06/26/25 09:55   Pulse 84 06/26/25 09:55   Resp 16 06/26/25 09:55   SpO2 100 06/26/25 09:55       Anesthesia Post Evaluation    Patient location during evaluation: PACU  Patient participation: complete - patient participated  Level of consciousness: awake and alert  Pain score: 0  Pain management: adequate  Airway patency: patent  Cardiovascular status: acceptable  Respiratory status: acceptable  Hydration status: acceptable  Postoperative Nausea and Vomiting: none        No notable events documented.

## 2025-06-26 NOTE — ANESTHESIA PROCEDURE NOTES
Airway  Date/Time: 6/26/2025 7:55 AM  Reason: elective    Airway not difficult    Staffing  Performed: attending   Authorized by: Luis Antonio Garcia MD    Performed by: Luis Antonio Garcia MD  Patient location during procedure: OR    Patient Condition  Indications for airway management: anesthesia and airway protection  Patient position: sniffing  MILS maintained throughout  Planned trial extubation  Sedation level: deep     Final Airway Details   Preoxygenated: yes  Final airway type: endotracheal airway  Successful airway: ETT  Cuffed: yes   Successful intubation technique: video laryngoscopy  Endotracheal tube insertion site: oral  Blade: Meredith  Blade size: #4  ETT size (mm): 7.0  Cormack-Lehane Classification: grade IIa - partial view of glottis  Placement verified by: chest auscultation, capnometry and palpation of cuff   Inital cuff pressure (cm H2O): 10  Cuff volume (mL): 10  Measured from: teeth  ETT to teeth (cm): 22  Ventilation between attempts: none  Number of attempts at approach: 1  Number of other approaches attempted: 0    Additional Comments  Easy. Atraumatic

## 2025-06-26 NOTE — OP NOTE
LAPAROSCOPIC ASSISTED PERITONEAL DIALYSIS CATHETER INSERTION/ POSSIBLE LAPAROSCOPIC OMENTOPEXY Operative Note     Date: 2025  OR Location: [facility] OR    Name: Yola Barillas, : 1970, Age: 55 y.o., MRN: [id number], Sex: female    Diagnosis  Pre-op Diagnosis      * Chronic kidney disease, stage V (Multi) [N18.5] Post-op Diagnosis     * Chronic kidney disease, stage V (Multi) [N18.5]     Procedures    LAPAROSCOPIC ASSISTED PERITONEAL DIALYSIS CATHETER INSERTION/ POSSIBLE LAPAROSCOPIC OMENTOPEXY  80418 - TX LAPS INSERTION TUNNELED INTRAPERITONEAL CATHETER      Surgeons      * Cirilo Gibson - Primary    Resident/Fellow/Other Assistant:  JYOTI Fritz    Staff:   Scrub Person: Vinicius  Circulator: Kiki  Surgical Assistant: Kaitlynn    Anesthesia Staff: Anesthesiologist: Luis Antonio Garcia MD    Procedure Summary  Anesthesia: General  ASA: III  Estimated Blood Loss: 40 mL  Intra-op Medications: Administrations occurring from 0730 to 0910 on 25:  * No intraprocedure medications in log *           Anesthesia Record               Intraprocedure I/O Totals       None           Specimen: No specimens collected     Drains and/or Catheters: * None in log *    Tourniquet Times:       Implants:     Findings: See below    Indications: Yola Barillas is an 55 y.o. female who is having surgery for Chronic kidney disease, stage V (Multi) [N18.5].     The patient was seen in the preoperative area. The risks, benefits, complications, treatment options, non-operative alternatives, expected recovery and outcomes were discussed with the patient. The possibilities of reaction to medication, pulmonary aspiration, injury to surrounding structures, bleeding, recurrent infection, the need for additional procedures, failure to diagnose a condition, and creating a complication requiring transfusion or operation were discussed with the patient. The patient concurred with the proposed plan, giving informed consent.  The site of  surgery was properly noted/marked if necessary per policy. The patient has been actively warmed in preoperative area. Preoperative antibiotics have been ordered and given within 1 hours of incision. Venous thrombosis prophylaxis have been ordered including bilateral sequential compression devices    Procedure Details: This is a patient with the above-noted diagnosis. In order to provide renal replacement therapy, the above-noted procedure is indicated and recommended. The procedure was explained to the patient in detail including the risks, benefits, and alternatives.  The patient agreed the plan and signed the consent. Please see the preoperative history and physical for details.    Findings:   The patient had a short omentum which was high riding in the upper abdomen and therefore omentopexy was not indicated.  The pelvis was free of adhesions.    Specimens:  none    Catheter Used:    Flex-Neck Classic Peritoneal Dialysis Catheter, 2 cuffs, Adult Standard, Coiled by [employer]    Procedure:    The patient was taken to the operating room placed on the table in the supine position. Preoperative huddle was accomplished with the OR team and the patient.  Satisfactory general endotracheal anesthesia was achieved.  The abdomen was prepared and draped in normal sterile fashion. The PD catheter insertion pattern was marked by the surgeon preoperatively in the LLQ using the stencil technique. After the appropriate timeout, the case commenced.    At [place] in the left upper quadrant of the abdomen, a 5 mm Optiview trocar was placed in the standard fashion under direct laparoscopic vision. Pneumoperitoneum was achieved the normal fashion. The patient was placed in Trendelenburg position. The above findings were noted.    At the previous marked catheter insertion site, just to the left and superior to the umbilicus, over the rectus sheath, a 4 centimeter transverse incision was made. The skin and subcutaneous tissue  divided down to the anterior rectus sheath.  Using the Implantation System for Peritoneal Dialysis Catheter by [facility], the peritoneal dialysis catheter was placed in the pelvis under direct laparoscopic vision in the standard and described fashion, such that the distal cuff was in the rectus sheath.    An additional right lower quadrant 5 mm trocar was placed.  An atraumatic grasper was used to adjust the position of the curl catheter in the left deep pelvis.  There was also bleeding which occurred from the abdominal wall with the insertion, and this ceased spontaneously.  There was some blood and clot in the left lower quadrant and left gutter and this was irrigated and suction with a suction .  All blood and clot were removed from the pelvis.    Following this, the proximal catheter was then tunneled along the previously stenciled pattern using the Toan Trocar in the standard fashion, and exited left laterally and superiorly at the previously marked exit site.  Using this technique, the proximal cuff was subcutaneous and was > 4 cm proximal to the exit site.    Following this, the two-piece titanium catheter connector was placed on the end of the PD catheter. This was then connected to  tubing, which was connected to a sterile bag of saline. Under gravity, there was excellent and complete flow of a 1.5 liters of fluid into the peritoneal cavity. Following this, the IV bag was placed on the floor, and there was satisfactory outflow also.  Following this, the tubing was disconnected, the transfer set was connected to the catheter, and just prior to dressing placement, a MiniCap with Povidone-Iodine Solution was ultimately placed on the proximal end of the transfer set.      Following this, the catheter was visualized laparoscopically prior to the conclusion of the case, confirming excellent positioning.  The laparoscope was removed, the pneumoperitoneum was evacuated, and the 5 mm trocars were  removed. The subcutaneous tissue at catheter insertion site was approximated using interrupted 3-0 Vicryl sutures. The skin at each incision was approximated using running 4-0 undyed subcuticular Vicryl sutures.  The wounds were cleaned and dried, benzoin and Steri-Strips were placed, followed by dry sterile Bioclusive dressings. In addition, the peritoneal dialysis catheter was dressed occlusively with a large Tegaderm dressing. This completed the procedure.    The patient tolerated the procedure well. Sponge, needle, and instrument counts were correct times 2. Total IVF were 150 cc of crystalloid, and EBL was 40 cc.  The patient was extubated in the operating room, and taken to the PACU with stable vital signs and in excellent condition.    Cirilo Gibson M.D.  Evidence of Infection: No   Complications:  None; patient tolerated the procedure well.    Disposition: PACU - hemodynamically stable.  Condition: stable     Task Performed by RNFA or Surgical Assistant:  Performed the typical tasks required of a First Assistant/Surgical Assistant/JOSSELINE First Assist or Physician Assistant such as prepping the patient, placing Harvey catheter if necessary, retraction, assisting with traction and countertraction, tying sutures, docking and assisting with instrumentation when utilizing the DaVinci robot, securing drains if necessary, approximating the skin of the incision, and placing occlusive sterile dressings.    Additional Details: None    Attending Attestation: I performed the procedure.    Cirilo Gibson  Phone Number: [phone]

## 2025-06-26 NOTE — DISCHARGE INSTRUCTIONS
For postoperative analgesia/pain relief, I recommend:     a.  Tylenol Extra Strength 500 mg 4 times a day on schedule, for 2-3 days, then as needed thereafter.      b.  Supplement with Tramadol 50 mg, dispense #12 for more severe or breakthrough pain, as needed.  This will be distributed to you by the  retail pharmacy prior to discharge today.

## 2025-07-07 ENCOUNTER — TELEPHONE (OUTPATIENT)
Dept: SURGERY | Facility: CLINIC | Age: 55
End: 2025-07-07
Payer: MEDICARE

## 2025-07-08 DIAGNOSIS — Z12.31 ENCOUNTER FOR SCREENING MAMMOGRAM FOR BREAST CANCER: ICD-10-CM

## 2025-07-09 ENCOUNTER — DOCUMENTATION (OUTPATIENT)
Dept: TRANSPLANT | Facility: HOSPITAL | Age: 55
End: 2025-07-09

## 2025-07-09 ENCOUNTER — APPOINTMENT (OUTPATIENT)
Dept: SURGERY | Facility: CLINIC | Age: 55
End: 2025-07-09
Payer: MEDICARE

## 2025-07-09 NOTE — PROGRESS NOTES
Per EV [employer] active no ded 395.00 IP copay days 1-5 4700.00 oop max.  Faxed eval request to .  Will discuss [place] Medicaid at [facility] appt.

## 2025-07-14 ENCOUNTER — TELEPHONE (OUTPATIENT)
Facility: HOSPITAL | Age: 55
End: 2025-07-14
Payer: MEDICARE

## 2025-07-14 ENCOUNTER — DOCUMENTATION (OUTPATIENT)
Facility: HOSPITAL | Age: 55
End: 2025-07-14
Payer: MEDICARE

## 2025-07-14 NOTE — PROGRESS NOTES
Called pt to confirm scheduled appointment for tomorrow. And to also confirm current medications and allergies. No answer from pt I left a vmm to inform pt about bringing a list of current medications she's currently taking.

## 2025-07-14 NOTE — TELEPHONE ENCOUNTER
Confirmed kidney eval appt day for: Left voicemail  Reviewed appointment plans for the day: YES/NO  Challenges with transportation to appointments? YES/NO  This could feel like a long day…Any concerns? YES/NO  Bringing support person: YES/NO

## 2025-07-15 ENCOUNTER — APPOINTMENT (OUTPATIENT)
Facility: HOSPITAL | Age: 55
End: 2025-07-15
Payer: MEDICARE

## 2025-07-15 ENCOUNTER — TELEPHONE (OUTPATIENT)
Facility: HOSPITAL | Age: 55
End: 2025-07-15
Payer: MEDICARE

## 2025-07-15 ENCOUNTER — DOCUMENTATION (OUTPATIENT)
Dept: TRANSPLANT | Facility: HOSPITAL | Age: 55
End: 2025-07-15
Payer: MEDICARE

## 2025-07-15 NOTE — PROGRESS NOTES
Rec;d 07/14/25 [facility] for kidney txp eval valid 07/09/25-07/08/26 ref# [id number].

## 2025-07-15 NOTE — H&P
Postoperative Visit    Yola presents for her postoperative visit.  On 6/26/2025 the patient underwent the following procedure:    Laparoscopic-assisted insertion of a peritoneal dialysis catheter    Please see the operative note for details.    Unremarkable postoperative course.  She only used tramadol for 3 days postoperatively.  She did see Cathy, the PD nurse, on July 8 with a satisfactory fill and drain.  She will see her again tomorrow.  She has had no issues with the incisions or PD catheter postoperatively.    6/4/2025:  Yola Barillas is a 55 y.o. female, patient of Brittany Yee CNP,  who presents on referral from her neurologist, Dr. Chinedu Bryant, for consideration of insertion of peritoneal dialysis catheter.     The patient has known chronic kidney disease stage IV/V.  The patient was recently admitted to [facility] from February 11 through February 19 for worsening renal failure and symptoms thereof.  Please see the discharge summary and other documentation for details.  A right internal jugular tunneled hemodialysis catheter was placed by Dr. Padron from vascular surgery on 2/13/2025, and the patient instituted hemodialysis thereafter.  Upon discharge, the patient has continued to dialyze at [address] [facility] q. Tuesday/Thursday/Saturday initially, and is now Monday/Wednesday/Friday.     Of note, the patient did see Dr. Padron in the office on 4/17/2025 with regard to permanent hemodialysis access.  That note was reviewed.  Based on the vein mapping, the patient is not a candidate for natural fistula and Dr. Padron's recommendation was to consider peritoneal dialysis.  He also recommended considering transplantation.     The patient's other medical problems include but not limited to anemia, NSTEMI, HLD, CVA, depression with suicidal ideation and T2DM.  Renal failure secondary to hypertensive and diabetic nephropathy also history of obstructive uropathy with right ureteral stenosis status  post stenting.      The patient has seen Dr. Marcos Ma cardiology in the past, most recently on 1/8/2025.  That note was reviewed.  The patient carries the diagnoses of coronary artery disease, history of a non-ST elevation MI, and hyperlipidemia she is on Plavix 75 mg p.o. daily.     Transthoracic echocardiography on 1/22/2025 revealed the following:  CONCLUSIONS:   1. Left ventricular ejection fraction is normal, by visual estimate at 65-70%.   2. Spectral Doppler shows a Grade I (impaired relaxation pattern) of left ventricular diastolic filling with normal left atrial filling pressure.   3. There is moderately increased septal and moderately increased posterior left ventricular wall thickness.   4. There is moderate concentric left ventricular hypertrophy.   5. There is normal right ventricular global systolic function.   6. The mitral valve is moderately thickened with a myxomatous appearance.   7. Mild to moderate mitral valve regurgitation.   8. Mild aortic valve stenosis.     Most recent EKG on 2/11/2025 revealed the following:  Normal sinus rhythm  Left anterior fascicular block  Abnormal ECG  When compared with ECG of 28-JAN-2025 21:16,  PREVIOUS ECG IS PRESENT  See ED provider note for full interpretation and clinical correlation  Confirmed by Mely Ahumada ([id number]) on 2/27/2025 9:16:27 PM     Past abdominal surgical history includes only the following procedure at [facility] on 10/19/2022:  Laparoscopic modified radical hysterectomy, bilateral salpingectomy, cystoscopy, ureteral stent removal; right ureteral stent replacement      The patient is single.  She has no children.  She lives in an apartment in [place].  She has been on disability for the last 2 years.  She has 4 brothers in the area that do look after her.     Past Medical History   [Medical History]     [Medical History]  Past Medical History       Diagnosis Date    Arteriosclerosis of coronary artery 08/24/2023    Cataract      Chronic  kidney disease, stage 3b (Multi) 03/21/2022     Stage 3b chronic kidney disease    Coronary artery disease involving native coronary artery of native heart without angina pectoris 11/30/2021     Formatting of this note might be different from the original. Impression: 1. There is severe diffuse disease in the small ramus branch. 2. Mild diffuse disease in the LAD and RCA. Recommended Treatment: Medical Therapy Last Assessment & Plan: Formatting of this note might be different from the original. Assessment: NSTEMI 11/2021 Impression: 1. There is severe diffuse disease in the small ramus br    Diabetic retinopathy (Multi)      Hydronephrosis 09/11/2022     Last Assessment & Plan: Formatting of this note might be different from the original. Scheduled for surgery on 9/29/2022    Obstruction of ureter 02/23/2024    Personal history of other endocrine, nutritional and metabolic disease 03/10/2022     History of vitamin D deficiency    Pyuria 09/11/2022    Retinal detachment      Sepsis due to Escherichia coli (Multi) 10/11/2022     Last Assessment & Plan: Formatting of this note might be different from the original. +sepsis d/t E.Coli Pt states that she was hospitalized in 10/9/2022 for UTI, she was given IV antibiotics and most recent UC was negative. She denies any LUTS. Surgeon has ordered another UC, will obtain at pacc appt.    Ureteral stricture, right 12/07/2022     Last Assessment & Plan: Formatting of this note might be different from the original. Scheduled for surgery on 12/22/2022    Urinary tract infection 08/24/2023    Uterine leiomyoma 08/08/2022        Surgical History    [Surgical History]     [Surgical History]  Past Surgical History        Procedure Laterality Date    INVASIVE VASCULAR PROCEDURE Right 2/13/2025     Procedure: Tunnel Dialysis Catheter Exchange;  Surgeon: Nabeel Padron DO;  Location: [facility];  Service: Vascular Surgery;  Laterality: Right;  Permacath right chest    IR CVC  PICC   10/20/2022     IR CVC PICC    OTHER SURGICAL HISTORY   03/30/2022     No history of surgery    RETINAL LASER PROCEDURE Left       PRP        Allergies   [RX Allergies]     [RX Allergies]  Allergies       Allergen Reactions    Sulfamethoxazole-Trimethoprim Angioedema        Home Meds       Current Outpatient Medications   Medication Instructions    acetaminophen (TYLENOL) 500 mg, Every 6 hours PRN    amLODIPine (NORVASC) 5 mg, oral, Daily    aspirin 81 mg chewable tablet 1 tablet, Daily    bisacodyl (DULCOLAX) 5 mg, oral, Daily PRN    blood sugar diagnostic (Accu-Chek Guide test strips) strip Use to test blood sugar once a day    blood-glucose meter (Accu-Chek Guide Glucose Meter) misc Use to test blood sugar once a day    buPROPion SR (WELLBUTRIN SR) 200 mg, oral, Daily    carvedilol (COREG) 12.5 mg, oral, 2 times daily    clopidogrel (PLAVIX) 75 mg, oral, Daily    docusate sodium (COLACE) 100 mg, oral, 2 times daily PRN    ferrous sulfate, 325 mg ferrous sulfate, (FeroSuL) tablet TAKE 1 TABLET (325 MG) BY MOUTH ONCE DAILY WITH BREAKFAST.    folic acid (FOLVITE) 1 mg, oral, Daily    lancets (Accu-Chek Fastclix Lancet Drum) misc Use to test blood sugar once a day    pantoprazole (PROTONIX) 20 mg, oral, Daily before breakfast         Family History    [Family History]     [Family History]         Problem Relation Name Age of Onset    Heart attack Mother            Social History  [Social History]     [Social History]        Tobacco Use    Smoking status: Never       Passive exposure: Never    Smokeless tobacco: Never   Vaping Use    Vaping status: Never Used   Substance Use Topics    Alcohol use: Never    Drug use: Never        Review Of Systems    Review of Systems     General: Not Present- Obesity, Cancer, HIV, MRSA, Recent Cold/Flu, Tired During the Day and VRE.  HEENT: Not Present- Migraine, Cataracts, Glaucoma, Macular Degeneration and Retinal Detachment.  Respiratory: Not Present- Asthma, Chronic Cough,  Difficulty Breathing on Exertion, Difficulty Breathing at Rest, Emphysema, Frequent Bronchitis, Home CPAP/BiPAP, Home Oxygen, Pulmonary Embolus, Pneumonia/TB, Sleep Apnea and Snoring.  Cardiovascular: Not Present- Chest Pain, Congestive Heart Failure, Heart Attack, Coronary Artery Disease, Heart Stent, High Cholesterol/Lipids,  Internal Defibrillator, Irregular Heart Beat, Mitral Valve Prolapse, Murmur, Pacemaker and Peripheral Vascular Disease.  Gastrointestinal: Not Present- Heartburn, Hepatitis, Hiatal Hernia, Jaundice, Reflux, Stomach Ulcer and IBS.  Female Genitourinary: Not Present- Kidney Failure, Kidney Stones, Dialysis and Urinary Tract Infection.  Musculoskeletal: Not Present- Arthritis, Back Pain and Fibromyalgia.  Neurological: Not Present- Headaches, Numbness, Tingling, Seizures, Stroke,  Shunt and Weakness.  Psychiatric: Not Present- Anxiety, Bipolar and Panic Attacks.  Endocrine: Not Present- Hyperthyroidism, Hypothyroidism and Low Blood Sugar.  Hematology: Not Present- Abnormal Bleeding and Blood Clots.     Vitals  There were no vitals taken for this visit.      Physical Exam     Post Op Abdominal Physical Exam    The physical exam findings are as follows:    General  General Appearance - Not in acute distress.    Integumentary  Abdominal Incision -5 mm laparoscopic left upper quadrant and right lower quadrant; 4 cm transverse incision over left rectus muscle  - dry, Intact, No evidence of infection, hematoma, or seroma, edges well-approximated.  No drainage present, no redness and no warmth to the touch.    PD catheter left mid abdomen -exit site clean and dry -dressing replaced    Chest and Lung Exam  Auscultation:  Breath sounds: - Normal and equal bilaterally.    Adventitious sounds: none    Cardiovascular  Auscultation: Rhythm - Regular. Rate - Regular.  Heart Sounds - Normal heart sounds.    Abdomen  Inspection: - Inspection Normal.  Palpation/Percussion: Palpation and Percussion of the  abdomen reveal - Non Tender, No Rebound tenderness, No Rigidity (guarding) and No Palpable abdominal masses.  Liver: - Normal.  Spleen: - Normal.  Auscultation: Auscultation of the abdomen reveals - Bowel sounds normal and No Abdominal bruits.  Surgical scars: 4 cm transverse over left rectus muscle PD catheter insertion site; 5 mm laparoscopic left upper quadrant and right lower quadrant     Assessment/Plan      Ms. Barillas is status post laparoscopic-assisted insertion of a peritoneal dialysis catheter.     Her postoperative course has been quite satisfactory.  The catheter is functioning well.    Recommendations:     No activity or dietary restrictions from my standpoint    Patient to see Ally her PD nurse tomorrow for second fill and drain; further instructions and PD catheter care per her peritoneal dialysis nurse    Follow-up with me as needed

## 2025-07-15 NOTE — Clinical Note
Rec;d 07/14/25 [facility] for kidney txp eval valid 07/09/25-07/08/26 ref# [id number].

## 2025-07-16 ENCOUNTER — APPOINTMENT (OUTPATIENT)
Dept: SURGERY | Facility: CLINIC | Age: 55
End: 2025-07-16
Payer: MEDICARE

## 2025-07-16 VITALS
OXYGEN SATURATION: 95 % | TEMPERATURE: 97.4 F | DIASTOLIC BLOOD PRESSURE: 80 MMHG | SYSTOLIC BLOOD PRESSURE: 133 MMHG | HEIGHT: 64 IN | WEIGHT: 171 LBS | RESPIRATION RATE: 18 BRPM | HEART RATE: 89 BPM | BODY MASS INDEX: 29.19 KG/M2

## 2025-07-16 DIAGNOSIS — N18.5 CHRONIC KIDNEY DISEASE, STAGE V (MULTI): Primary | ICD-10-CM

## 2025-07-16 PROCEDURE — 99213 OFFICE O/P EST LOW 20 MIN: CPT | Performed by: SURGERY

## 2025-07-16 PROCEDURE — 3075F SYST BP GE 130 - 139MM HG: CPT | Performed by: SURGERY

## 2025-07-16 PROCEDURE — 3079F DIAST BP 80-89 MM HG: CPT | Performed by: SURGERY

## 2025-07-16 PROCEDURE — 3008F BODY MASS INDEX DOCD: CPT | Performed by: SURGERY

## 2025-08-12 ENCOUNTER — APPOINTMENT (OUTPATIENT)
Dept: PRIMARY CARE | Facility: CLINIC | Age: 55
End: 2025-08-12
Payer: MEDICARE

## 2025-08-12 VITALS
DIASTOLIC BLOOD PRESSURE: 82 MMHG | SYSTOLIC BLOOD PRESSURE: 150 MMHG | TEMPERATURE: 97.2 F | HEART RATE: 89 BPM | OXYGEN SATURATION: 99 % | BODY MASS INDEX: 30.76 KG/M2 | WEIGHT: 179.2 LBS

## 2025-08-12 DIAGNOSIS — K59.09 CHRONIC CONSTIPATION: ICD-10-CM

## 2025-08-12 DIAGNOSIS — T46.4X2A POISONING BY ANGIOTENSIN-CONVERTING-ENZYME INHIBITORS, INTENTIONAL SELF-HARM, INITIAL ENCOUNTER: ICD-10-CM

## 2025-08-12 DIAGNOSIS — R30.9 PAIN WITH URINATION: ICD-10-CM

## 2025-08-12 DIAGNOSIS — F33.2 MAJOR DEPRESSIVE DISORDER, RECURRENT SEVERE WITHOUT PSYCHOTIC FEATURES (MULTI): ICD-10-CM

## 2025-08-12 DIAGNOSIS — T38.3X2A: ICD-10-CM

## 2025-08-12 DIAGNOSIS — T39.1X2A: ICD-10-CM

## 2025-08-12 DIAGNOSIS — F33.1 MODERATE EPISODE OF RECURRENT MAJOR DEPRESSIVE DISORDER: ICD-10-CM

## 2025-08-12 DIAGNOSIS — R39.15 URGENCY OF URINATION: Primary | ICD-10-CM

## 2025-08-12 LAB
POC APPEARANCE, URINE: CLEAR
POC BILIRUBIN, URINE: ABNORMAL
POC BLOOD, URINE: ABNORMAL
POC COLOR, URINE: YELLOW
POC GLUCOSE, URINE: ABNORMAL MG/DL
POC KETONES, URINE: NEGATIVE MG/DL
POC LEUKOCYTES, URINE: ABNORMAL
POC NITRITE,URINE: NEGATIVE
POC PH, URINE: 6 PH
POC PROTEIN, URINE: ABNORMAL MG/DL
POC SPECIFIC GRAVITY, URINE: 1.02
POC UROBILINOGEN, URINE: 1 EU/DL

## 2025-08-12 PROCEDURE — G2211 COMPLEX E/M VISIT ADD ON: HCPCS | Performed by: NURSE PRACTITIONER

## 2025-08-12 PROCEDURE — 3077F SYST BP >= 140 MM HG: CPT | Performed by: NURSE PRACTITIONER

## 2025-08-12 PROCEDURE — 99214 OFFICE O/P EST MOD 30 MIN: CPT | Performed by: NURSE PRACTITIONER

## 2025-08-12 PROCEDURE — 1036F TOBACCO NON-USER: CPT | Performed by: NURSE PRACTITIONER

## 2025-08-12 PROCEDURE — 81002 URINALYSIS NONAUTO W/O SCOPE: CPT | Performed by: NURSE PRACTITIONER

## 2025-08-12 PROCEDURE — 3079F DIAST BP 80-89 MM HG: CPT | Performed by: NURSE PRACTITIONER

## 2025-08-12 RX ORDER — GENTAMICIN SULFATE 1 MG/G
CREAM TOPICAL
COMMUNITY
Start: 2025-07-31

## 2025-08-12 RX ORDER — BISACODYL 5 MG
5 TABLET, DELAYED RELEASE (ENTERIC COATED) ORAL DAILY PRN
Qty: 30 TABLET | Refills: 0 | Status: SHIPPED | OUTPATIENT
Start: 2025-08-12

## 2025-08-12 ASSESSMENT — PATIENT HEALTH QUESTIONNAIRE - PHQ9
2. FEELING DOWN, DEPRESSED OR HOPELESS: NOT AT ALL
SUM OF ALL RESPONSES TO PHQ9 QUESTIONS 1 AND 2: 0
1. LITTLE INTEREST OR PLEASURE IN DOING THINGS: NOT AT ALL

## 2025-08-12 ASSESSMENT — ENCOUNTER SYMPTOMS
FLANK PAIN: 0
CHILLS: 0
DEPRESSION: 0
FREQUENCY: 1
HEMATURIA: 0

## 2025-08-12 ASSESSMENT — PAIN SCALES - GENERAL: PAINLEVEL_OUTOF10: 0-NO PAIN

## 2025-08-13 ENCOUNTER — TELEPHONE (OUTPATIENT)
Dept: PRIMARY CARE | Facility: CLINIC | Age: 55
End: 2025-08-13
Payer: MEDICARE

## 2025-08-14 LAB
AMORPH SED URNS QL MICRO: ABNORMAL /HPF
APPEARANCE UR: ABNORMAL
BACTERIA #/AREA URNS HPF: ABNORMAL /HPF
BACTERIA UR CULT: ABNORMAL
BACTERIA UR CULT: ABNORMAL
BILIRUB UR QL STRIP: NEGATIVE
COLOR UR: ABNORMAL
GLUCOSE UR QL STRIP: NEGATIVE
HGB UR QL STRIP: ABNORMAL
HYALINE CASTS #/AREA URNS LPF: ABNORMAL /LPF
KETONES UR QL STRIP: NEGATIVE
LEUKOCYTE ESTERASE UR QL STRIP: ABNORMAL
NITRITE UR QL STRIP: NEGATIVE
PH UR STRIP: 8 [PH] (ref 5–8)
PROT UR QL STRIP: ABNORMAL
RBC #/AREA URNS HPF: ABNORMAL /HPF
SERVICE CMNT-IMP: ABNORMAL
SP GR UR STRIP: 1.01 (ref 1–1.03)
SQUAMOUS #/AREA URNS HPF: ABNORMAL /HPF
WBC #/AREA URNS HPF: ABNORMAL /HPF

## 2025-08-20 DIAGNOSIS — K59.09 CHRONIC CONSTIPATION: ICD-10-CM

## 2025-08-22 RX ORDER — DOCUSATE SODIUM 100 MG/1
100 CAPSULE, LIQUID FILLED ORAL 2 TIMES DAILY PRN
Qty: 180 CAPSULE | Refills: 3 | Status: SHIPPED | OUTPATIENT
Start: 2025-08-22

## 2025-08-26 ENCOUNTER — TELEPHONE (OUTPATIENT)
Facility: CLINIC | Age: 55
End: 2025-08-26
Payer: MEDICARE

## 2025-08-27 ENCOUNTER — OFFICE VISIT (OUTPATIENT)
Facility: CLINIC | Age: 55
End: 2025-08-27
Payer: MEDICARE

## 2025-08-27 VITALS — WEIGHT: 173.2 LBS | HEIGHT: 64 IN | BODY MASS INDEX: 29.57 KG/M2 | HEART RATE: 77 BPM

## 2025-08-27 DIAGNOSIS — E04.1 THYROID NODULE: Primary | ICD-10-CM

## 2025-08-27 PROCEDURE — 1036F TOBACCO NON-USER: CPT | Performed by: INTERNAL MEDICINE

## 2025-08-27 PROCEDURE — 99203 OFFICE O/P NEW LOW 30 MIN: CPT | Performed by: INTERNAL MEDICINE

## 2025-08-27 PROCEDURE — 3008F BODY MASS INDEX DOCD: CPT | Performed by: INTERNAL MEDICINE

## 2025-08-27 PROCEDURE — 99213 OFFICE O/P EST LOW 20 MIN: CPT | Performed by: INTERNAL MEDICINE

## 2025-08-27 ASSESSMENT — ENCOUNTER SYMPTOMS
DEPRESSION: 0
OCCASIONAL FEELINGS OF UNSTEADINESS: 0
LOSS OF SENSATION IN FEET: 0

## 2025-08-27 ASSESSMENT — PAIN SCALES - GENERAL: PAINLEVEL_OUTOF10: 0-NO PAIN

## 2025-10-30 ENCOUNTER — APPOINTMENT (OUTPATIENT)
Dept: PRIMARY CARE | Facility: CLINIC | Age: 55
End: 2025-10-30
Payer: MEDICARE

## (undated) DEVICE — TUBING SET, BIFURCATED, SMOKE EVAC, FILTERED, F/AIRSEAL

## (undated) DEVICE — Device

## (undated) DEVICE — SLEEVE, KII, Z-THREAD, 5X100CM

## (undated) DEVICE — GEL, ECOVUE, ULTRASOUND, 20GRAM, STERILE

## (undated) DEVICE — STRIP, SKIN CLOSURE, STERI STRIP, REINFORCED, 0.5 X 4 IN

## (undated) DEVICE — HOLSTER, ELECTROSURGERY ACCESSORY, STERILE

## (undated) DEVICE — ASSEMBLY, STRYKER FLOW 2, SUCTION IRRIGATOR, WITH TIP

## (undated) DEVICE — DRESSING, DRAIN SPONGE, EXCILON AMD, 4X4 IN, 6 PLY, ST

## (undated) DEVICE — HANDLE, PH, FOR YANKAUER SUCTION DEVICE

## (undated) DEVICE — TROCAR, KII OPTICAL BLADELESS 5MM Z THREAD 100MM LNGTH

## (undated) DEVICE — ACCESS KIT, MINI MAK, 4FR X 10CML, 0.018 X 40CM, SS/SS, ECHO ENHANCED 7CM NDL

## (undated) DEVICE — GRASPER TIP, LONG FENESTRATED, DISP

## (undated) DEVICE — PHOTONBLADE

## (undated) DEVICE — TRANSFER SET, DIALYSIS, PERITONEAL, EXTENDED LIFE, MINICAP, W/TWIST CLAMP, 6 IN

## (undated) DEVICE — DRESSING, GAUZE, SPONGE, 8 PLY, CURITY, 2 X 2 IN, STERILE

## (undated) DEVICE — SYRINGE, 30 CC, LUER LOCK

## (undated) DEVICE — DRESSING, GAUZE, SPONGE, 12 PLY, CURITY, 4 X 4 IN, RIGID TRAY, STERILE

## (undated) DEVICE — SYRINGE, 10 CC, LUER LOCK

## (undated) DEVICE — DRESSING, TRANSPARENT, TEGADERM, 6 X 8 IN

## (undated) DEVICE — LUBRICANT, SURGILUBE, STERILE, 2OZ

## (undated) DEVICE — GRASPER TIP, MODIFIED RAPTOR, 5MM, DISP

## (undated) DEVICE — ELECTRODE, ELECTROSURGICAL, PENCIL, HAND CONTROL, BLADE, 10 FT CORD, LF

## (undated) DEVICE — CLEAN KIT, ANTIFOG SCOPE, SEE SHARP 150MM

## (undated) DEVICE — SYRINGE, 30 CC, LUER SLIP TIP

## (undated) DEVICE — SPONGE, LAP, XRAY DECT, 18IN X 18IN, W/LOOP, STERILE

## (undated) DEVICE — SLEEVE, VASO PRESS, CALF GARMENT, MEDIUM, GREEN